# Patient Record
Sex: FEMALE | Race: BLACK OR AFRICAN AMERICAN | NOT HISPANIC OR LATINO | Employment: PART TIME | URBAN - METROPOLITAN AREA
[De-identification: names, ages, dates, MRNs, and addresses within clinical notes are randomized per-mention and may not be internally consistent; named-entity substitution may affect disease eponyms.]

---

## 2018-10-31 ENCOUNTER — HOSPITAL ENCOUNTER (EMERGENCY)
Facility: MEDICAL CENTER | Age: 23
End: 2018-10-31
Attending: EMERGENCY MEDICINE
Payer: COMMERCIAL

## 2018-10-31 VITALS
SYSTOLIC BLOOD PRESSURE: 129 MMHG | BODY MASS INDEX: 19.66 KG/M2 | TEMPERATURE: 98.7 F | DIASTOLIC BLOOD PRESSURE: 84 MMHG | HEART RATE: 94 BPM | RESPIRATION RATE: 18 BRPM | HEIGHT: 69 IN | WEIGHT: 132.72 LBS | OXYGEN SATURATION: 98 %
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
## 2018-10-31 PROCEDURE — 99284 EMERGENCY DEPT VISIT MOD MDM: CPT

## 2018-10-31 RX ORDER — SERTRALINE HYDROCHLORIDE 100 MG/1
100 TABLET, FILM COATED ORAL DAILY
COMMUNITY
End: 2020-05-15
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
## 2018-10-31 NOTE — ED NOTES
RN went through discharge paperwork with the pt. And her significant other. The pt. Was receptive to instructions.
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
## 2019-01-14 ENCOUNTER — NON-PROVIDER VISIT (OUTPATIENT)
Dept: URGENT CARE | Facility: CLINIC | Age: 24
End: 2019-01-14
Payer: COMMERCIAL

## 2019-01-14 DIAGNOSIS — Z02.1 PRE-EMPLOYMENT DRUG SCREENING: ICD-10-CM

## 2019-01-14 LAB
AMP AMPHETAMINE: NORMAL
COC COCAINE: NORMAL
INT CON NEG: NEGATIVE
INT CON POS: POSITIVE
MET METHAMPHETAMINES: NORMAL
OPI OPIATES: NORMAL
PCP PHENCYCLIDINE: NORMAL
POC DRUG COMMENT 753798-OCCUPATIONAL HEALTH: NEGATIVE
THC: NORMAL

## 2019-01-14 PROCEDURE — 80305 DRUG TEST PRSMV DIR OPT OBS: CPT | Performed by: FAMILY MEDICINE

## 2019-10-31 ENCOUNTER — HOSPITAL ENCOUNTER (OUTPATIENT)
Dept: RADIOLOGY | Facility: MEDICAL CENTER | Age: 24
End: 2019-10-31
Attending: PHYSICAL MEDICINE & REHABILITATION
Payer: COMMERCIAL

## 2019-10-31 DIAGNOSIS — M54.2 CERVICALGIA: ICD-10-CM

## 2019-10-31 PROCEDURE — 72141 MRI NECK SPINE W/O DYE: CPT

## 2020-03-12 ENCOUNTER — TELEPHONE (OUTPATIENT)
Dept: HEALTH INFORMATION MANAGEMENT | Facility: OTHER | Age: 25
End: 2020-03-12

## 2020-03-12 NOTE — TELEPHONE ENCOUNTER
1. Caller Name: Edward                        Call Back Number: [phone]  [facility] PCP or Specialty Provider: No         2.  Does patient have any active symptoms of respiratory illness (fever OR cough OR shortness of breath)? Yes, the patient reports the following respiratory symptoms: cough x 7 days. No fever or SOB. C/o fatigue and productive cough. Runny nose.     3.  Does patient have any comoribidities? None     4.  In the last 30 days, has the patient traveled outside of the country OR in a high risk area within the [place] OR have any known contact with someone who has or is suspected to have COVID-19?  No.    5. Disposition: Advised to go to [facility] She verbalized agreement and understanding.

## 2020-03-30 ENCOUNTER — HOSPITAL ENCOUNTER (EMERGENCY)
Facility: MEDICAL CENTER | Age: 25
End: 2020-03-30
Attending: EMERGENCY MEDICINE
Payer: COMMERCIAL

## 2020-03-30 VITALS
HEART RATE: 76 BPM | WEIGHT: 127.87 LBS | DIASTOLIC BLOOD PRESSURE: 94 MMHG | RESPIRATION RATE: 16 BRPM | HEIGHT: 68 IN | OXYGEN SATURATION: 99 % | TEMPERATURE: 98.6 F | BODY MASS INDEX: 19.38 KG/M2 | SYSTOLIC BLOOD PRESSURE: 142 MMHG

## 2020-03-30 DIAGNOSIS — F41.9 ANXIETY: ICD-10-CM

## 2020-03-30 LAB
AMPHET UR QL SCN: NEGATIVE
BARBITURATES UR QL SCN: NEGATIVE
BENZODIAZ UR QL SCN: NEGATIVE
BZE UR QL SCN: NEGATIVE
CANNABINOIDS UR QL SCN: POSITIVE
HCG UR QL: NEGATIVE
METHADONE UR QL SCN: NEGATIVE
OPIATES UR QL SCN: NEGATIVE
OXYCODONE UR QL SCN: NEGATIVE
PCP UR QL SCN: NEGATIVE
POC BREATHALIZER: 0 PERCENT (ref 0–0.01)
PROPOXYPH UR QL SCN: NEGATIVE

## 2020-03-30 PROCEDURE — 90791 PSYCH DIAGNOSTIC EVALUATION: CPT

## 2020-03-30 PROCEDURE — 99284 EMERGENCY DEPT VISIT MOD MDM: CPT

## 2020-03-30 PROCEDURE — 80307 DRUG TEST PRSMV CHEM ANLYZR: CPT

## 2020-03-30 PROCEDURE — 81025 URINE PREGNANCY TEST: CPT

## 2020-03-30 PROCEDURE — 302970 POC BREATHALIZER: Performed by: EMERGENCY MEDICINE

## 2020-03-30 RX ORDER — HYDROXYZINE HYDROCHLORIDE 25 MG/1
25 TABLET, FILM COATED ORAL 3 TIMES DAILY PRN
Qty: 30 TAB | Refills: 0 | Status: SHIPPED | OUTPATIENT
Start: 2020-03-30 | End: 2020-05-15 | Stop reason: SDUPTHER

## 2020-03-30 SDOH — HEALTH STABILITY: MENTAL HEALTH: HOW OFTEN DO YOU HAVE A DRINK CONTAINING ALCOHOL?: NEVER
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
## 2020-03-30 NOTE — ED NOTES
Patient up for discharge after interventions. Patient observed leaving from ER prior to D/C summary to be completed with patient. D/c summary not completed.

## 2020-03-30 NOTE — DISCHARGE PLANNING
Alert Team  Noted pt in [facility] with psych related complaint in triage note.    BE AWARE that pt was just in [facility] area on 3/25/2020.    IN ADDITION, pt called [facility] line 3/12/2020 and reported productive cough and fatigue; was advised to f/u at [facility]; no f/u visit noted in chart.
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
## 2020-03-31 ENCOUNTER — APPOINTMENT (OUTPATIENT)
Dept: BEHAVIORAL HEALTH | Facility: CLINIC | Age: 25
End: 2020-03-31
Payer: COMMERCIAL

## 2020-04-16 ENCOUNTER — HOSPITAL ENCOUNTER (EMERGENCY)
Facility: MEDICAL CENTER | Age: 25
End: 2020-04-16
Attending: EMERGENCY MEDICINE
Payer: COMMERCIAL

## 2020-04-16 VITALS
RESPIRATION RATE: 17 BRPM | TEMPERATURE: 98.2 F | HEART RATE: 83 BPM | OXYGEN SATURATION: 98 % | WEIGHT: 129.63 LBS | SYSTOLIC BLOOD PRESSURE: 158 MMHG | HEIGHT: 68 IN | BODY MASS INDEX: 19.65 KG/M2 | DIASTOLIC BLOOD PRESSURE: 102 MMHG

## 2020-04-16 DIAGNOSIS — Z76.89 ENCOUNTER FOR PSYCHIATRIC ASSESSMENT: ICD-10-CM

## 2020-04-16 PROCEDURE — 99284 EMERGENCY DEPT VISIT MOD MDM: CPT

## 2020-04-16 PROCEDURE — 90791 PSYCH DIAGNOSTIC EVALUATION: CPT

## 2020-04-16 ASSESSMENT — LIFESTYLE VARIABLES: DO YOU DRINK ALCOHOL: NO

## 2020-04-17 NOTE — ED NOTES
Pt Given discharge instructions/  home care instructions, Pt verbalized understanding of instructions given, Pt going to [facility] with  as escort,  pt ambulatory to ER yessica.

## 2020-04-17 NOTE — DISCHARGE INSTRUCTIONS
You were evaluated today for a psychiatric assessment. Your physical exam and labs were reassuring. You were medically cleared in the emergency department, had a psychiatric evaluation performed and you are being discharged from the emergency department. Please follow all the recommendations set by your psychiatrist.    If you have thoughts of suicide, please seek help. This may be a family member, friend, mental health professional, suicide hotline, or any emergency department.     National Suicide Prevention Lifeline: 1-910.372.7012  Available 24hours a day, 7 days a week    Please return to the ED or seek medical attention if you develop:  Fever, severe headache, vomiting, thoughts of suicide or other concerning findings      ================================  Coronavirus Information    Your visit did NOT relate to coronavirus, but if you or your family develop symptoms that concern you for coronavirus (please see CDC website for symptoms), please contact the [facility] hotline (or your local health department)  or your healthcare provider before going to a medical facility:    [facility]  Daytime hours: [phone]  Anytime: 311    Information is available from the Centers for Disease Control and Prevention  www.CDC.gov    and     [facility]  [email or web address]    If you are severely ill or having a hard time breathing, please immediatly seek medical care. Notify the  or Emergency Department Triage about your symptoms.

## 2020-04-17 NOTE — ED NOTES
Received report from Justus MICHELLE Pt care responsibilities assumed. Pt ambulated to room without assistance. Pt ready for eval.

## 2020-04-17 NOTE — DISCHARGE PLANNING
Alert Team note: Consulted with ERP. Patient denies Suicidal or Self harm ideation. Gave patient and her  resources for RB. RB notified that patient will be coming for psychic evaluation upon discharge from [facility].

## 2020-04-17 NOTE — ED PROVIDER NOTES
ED Provider Note    Scribed for Huang Moreno M.D. by Rosemary Lambert. 4/16/2020,  6:05 PM.    Means of Arrival: Walk-In  History obtained from: Patient  History limited by: None     CHIEF COMPLAINT  Chief Complaint   Patient presents with   • Psych Eval     possible bipolar, denies SI or HI       HPI  Edward Gan is a 24 y.o. female with a history of anxiety who presents to the Emergency Department for psychiatric evaluation. She states that she is here because she believes that she is bipolar. She reports associated episodes of anger and depression. She currently complains of fatigue and dehydrated. Patient has eaten today. She is requesting to take a nap and discuss her options later. She notes that her mother exacerbates her symptoms of anger and depression, however she does not have much contact with her anymore. Patient has a history of anxiety, but does not currently taking her medication for it. She smokes marijuana occasionally. Denies suicidal or homicidal ideation. She states that her  in a psychiatrist and he is informing her that she has bipolar disorder. She is requesting resources for psychiatric help.     REVIEW OF SYSTEMS  CONSTITUTIONAL:  No fever. Positive for fatigue and dehydration.   CARDIOVASCULAR:  No chest discomfort.  RESPIRATORY:  No pleuritic chest pain.  GASTROINTESTINAL:  No abdominal pain.  GENITOURINARY:   No dysuria.  MUSCULOSKELETAL:  No arthralgia.  PSYCHIATRIC: Positive for anger, depression, and anxiety. No SI or HI.    See HPI for further details.     PAST MEDICAL HISTORY  Past Medical History:   Diagnosis Date   • Anxiety    • Depression        FAMILY HISTORY  None Pertinent    SOCIAL HISTORY   reports that she has never smoked. She has never used smokeless tobacco. She reports current drug use. She reports that she does not drink alcohol.    SURGICAL HISTORY  History reviewed. No pertinent surgical history.    CURRENT MEDICATIONS  Current Outpatient  "Medications:   •  hydrOXYzine HCl (ATARAX) 25 MG Tab, Take 1 Tab by mouth 3 times a day as needed for Anxiety., Disp: 30 Tab, Rfl: 0  •  sertraline (ZOLOFT) 100 MG Tab, Take 100 mg by mouth every day., Disp: , Rfl:      ALLERGIES  No Known Allergies    PHYSICAL EXAM  VITAL SIGNS: BP (!) 161/104   Pulse 83   Temp 36.8 °C (98.2 °F) (Temporal)   Resp 16   Ht 1.727 m (5' 8\")   Wt 58.8 kg (129 lb 10.1 oz)   SpO2 100%   BMI 19.71 kg/m²    Gen: Alert, no acute distress  HEENT: ATNC  Eyes: PERRL, EOMI, normal conjunctiva.   Neck: trachea midline  Resp: no respiratory distress, lungs clear.   CV: No JVD  Cardiac: Regular rate and rhythm.   Abd: non-distended  Ext: No deformities  Psych: normal mood  Neuro: speech fluent       COURSE & MEDICAL DECISION MAKING  Pertinent Labs & Imaging studies reviewed. (See chart for details)    6:05 PM Patient seen and examined at bedside. Patient present today requesting psychiatric resources. Discussed plan of care with patient, including likely discharge and outpatient follow up with behavioral health. Patient was given opportunity to ask questions and agrees to plan.     6:10 PM - Discussed patient's case with  in [facility]. He states that he is physician and that patient has been manic for the past week and traveled to [place]. 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 Once returned, he saw many messages from her phone implying male companionship. He agrees that she does not meet for legal hold, but hoping to get voluntary treatment.     Medical Decision Making:  Patient presents for psychiatric evaluation.  She does not appear to meet criteria for legal hold, no evidence of disorganized thought, no evidence of suicidal or homicidal ideation.  After discussion with the patient and her , will talk with our alert team to help arrange for resources for possible voluntary placement.  No evidence of toxidrome.    Patient was evaluated by the alert team, will arrange " for them to proceed directly to [place] [facility] for voluntary evaluation.    The patient will return for new or worsening symptoms and is stable at the time of discharge.    The patient is referred to a primary physician for blood pressure management, diabetic screening, and for all other preventative health concerns.    DISPOSITION:  Patient will be discharged home in stable condition.    FOLLOW UP:  To establish a primary care provider within our system, please call [phone]          [facility]  [address] [place] [id number] [phone]  Go to           FINAL IMPRESSION  1. Encounter for psychiatric assessment            Rosemary CHURCH (Scribe), am scribing for, and in the presence of, Huang Moreno M.D..    Electronically signed by: Rosemary Lambert (Scribe), 4/16/2020    IHuang M.D. personally performed the services described in this documentation, as scribed by Rosemary Lambert in my presence, and it is both accurate and complete. E    The note accurately reflects work and decisions made by me.  Huang Moreno M.D.  4/17/2020  12:30 AM

## 2020-04-30 ENCOUNTER — HOSPITAL ENCOUNTER (OUTPATIENT)
Dept: LAB | Facility: MEDICAL CENTER | Age: 25
End: 2020-04-30
Attending: OBSTETRICS & GYNECOLOGY
Payer: COMMERCIAL

## 2020-04-30 LAB
DHEA-S SERPL-MCNC: 436 UG/DL (ref 148–407)
TESTOST SERPL-MCNC: 43 NG/DL (ref 9–75)

## 2020-04-30 PROCEDURE — 84403 ASSAY OF TOTAL TESTOSTERONE: CPT

## 2020-04-30 PROCEDURE — 82157 ASSAY OF ANDROSTENEDIONE: CPT

## 2020-04-30 PROCEDURE — 82627 DEHYDROEPIANDROSTERONE: CPT

## 2020-04-30 PROCEDURE — 36415 COLL VENOUS BLD VENIPUNCTURE: CPT

## 2020-05-03 LAB — ANDROST SERPL-MCNC: 1.62 NG/ML (ref 0.26–2.14)

## 2020-05-12 ENCOUNTER — TELEMEDICINE (OUTPATIENT)
Dept: BEHAVIORAL HEALTH | Facility: CLINIC | Age: 25
End: 2020-05-12
Payer: COMMERCIAL

## 2020-05-12 DIAGNOSIS — Z63.0 STRESS DUE TO MARITAL PROBLEMS: ICD-10-CM

## 2020-05-12 DIAGNOSIS — F43.10 PTSD (POST-TRAUMATIC STRESS DISORDER): ICD-10-CM

## 2020-05-12 DIAGNOSIS — Z62.820 PARENT-CHILD RELATIONSHIP PROBLEM: ICD-10-CM

## 2020-05-12 PROCEDURE — 90791 PSYCH DIAGNOSTIC EVALUATION: CPT | Mod: 95,CR | Performed by: MARRIAGE & FAMILY THERAPIST

## 2020-05-12 RX ORDER — RISPERIDONE 2 MG/1
2 TABLET, ORALLY DISINTEGRATING ORAL 2 TIMES DAILY
COMMUNITY
End: 2020-05-29

## 2020-05-12 RX ORDER — LORAZEPAM 0.5 MG/1
0.5 TABLET ORAL EVERY 4 HOURS PRN
COMMUNITY
End: 2020-05-29

## 2020-05-12 SDOH — SOCIAL STABILITY - SOCIAL INSECURITY: PROBLEMS IN RELATIONSHIP WITH SPOUSE OR PARTNER: Z63.0
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
## 2020-05-15 ENCOUNTER — TELEMEDICINE (OUTPATIENT)
Dept: BEHAVIORAL HEALTH | Facility: CLINIC | Age: 25
End: 2020-05-15
Payer: COMMERCIAL

## 2020-05-15 VITALS — WEIGHT: 135 LBS | HEIGHT: 68 IN | BODY MASS INDEX: 20.46 KG/M2

## 2020-05-15 DIAGNOSIS — F43.10 PTSD (POST-TRAUMATIC STRESS DISORDER): ICD-10-CM

## 2020-05-15 DIAGNOSIS — F41.1 GENERALIZED ANXIETY DISORDER: ICD-10-CM

## 2020-05-15 DIAGNOSIS — F31.70 BIPOLAR DISORDER IN PARTIAL REMISSION, MOST RECENT EPISODE UNSPECIFIED TYPE (HCC): ICD-10-CM

## 2020-05-15 PROBLEM — F31.9 BIPOLAR DISORDER, UNSPECIFIED (HCC): Status: ACTIVE | Noted: 2020-05-15

## 2020-05-15 PROCEDURE — 99204 OFFICE O/P NEW MOD 45 MIN: CPT | Mod: GC,95,CR | Performed by: PSYCHIATRY & NEUROLOGY

## 2020-05-15 RX ORDER — HYDROXYZINE HYDROCHLORIDE 25 MG/1
25 TABLET, FILM COATED ORAL 3 TIMES DAILY PRN
Qty: 90 TAB | Refills: 1 | Status: SHIPPED | OUTPATIENT
Start: 2020-05-15 | End: 2020-08-12 | Stop reason: SDUPTHER

## 2020-05-15 RX ORDER — ARIPIPRAZOLE 5 MG/1
5 TABLET ORAL DAILY
Qty: 30 TAB | Refills: 1 | Status: SHIPPED | OUTPATIENT
Start: 2020-05-15 | End: 2020-05-29 | Stop reason: SDUPTHER

## 2020-05-15 ASSESSMENT — PATIENT HEALTH QUESTIONNAIRE - PHQ9
CLINICAL INTERPRETATION OF PHQ2 SCORE: 1
5. POOR APPETITE OR OVEREATING: 3 - NEARLY EVERY DAY
SUM OF ALL RESPONSES TO PHQ QUESTIONS 1-9: 13
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
## 2020-05-29 ENCOUNTER — TELEMEDICINE (OUTPATIENT)
Dept: BEHAVIORAL HEALTH | Facility: CLINIC | Age: 25
End: 2020-05-29
Payer: COMMERCIAL

## 2020-05-29 VITALS — HEIGHT: 68 IN | BODY MASS INDEX: 20.46 KG/M2 | WEIGHT: 135 LBS

## 2020-05-29 DIAGNOSIS — F31.70 BIPOLAR DISORDER IN PARTIAL REMISSION, MOST RECENT EPISODE UNSPECIFIED TYPE (HCC): ICD-10-CM

## 2020-05-29 DIAGNOSIS — Z79.899 LONG TERM CURRENT USE OF ANTIPSYCHOTIC MEDICATION: ICD-10-CM

## 2020-05-29 DIAGNOSIS — F41.1 GENERALIZED ANXIETY DISORDER: ICD-10-CM

## 2020-05-29 DIAGNOSIS — F43.10 PTSD (POST-TRAUMATIC STRESS DISORDER): ICD-10-CM

## 2020-05-29 PROCEDURE — 99214 OFFICE O/P EST MOD 30 MIN: CPT | Mod: GC,95,CR | Performed by: PSYCHIATRY & NEUROLOGY

## 2020-05-29 RX ORDER — ARIPIPRAZOLE 5 MG/1
5 TABLET ORAL DAILY
Qty: 30 TAB | Refills: 3 | Status: SHIPPED | OUTPATIENT
Start: 2020-05-29 | End: 2020-08-12 | Stop reason: SDUPTHER

## 2020-05-29 NOTE — PROGRESS NOTES
[facility]  PSYCHIATRIC FOLLOW-UP NOTE      Reason for visit / chief complaint:   Chief Complaint   Patient presents with   • Follow-Up       SUBJECTIVE / HPI:  Edward reports that she is doing pretty well overall, she likes the Abilify a lot better.  She says that her mood is stable, she is not having any problems losing her temper or regulating her emotions.  Her grandfather passed away 2 weeks ago that she denies any feelings of depression at this time.  She feels a little sleepy in the afternoon, has been taking some naps.  She has been taking CBD Gummies to help her sleep, has stopped using cannabis at the advice of her psychiatrist on Dr. on demand.  Still endorses some mild anxiety, but takes hydroxyzine as needed, is not having any panic attacks. She is staying in [place] right now with her , they have started couples therapy and have put the divorce on hold for now.  She has been traveling back and forth between [place] and [place] with her mother.  She feels that overall her situation has stabilized.    One thing she wants to discuss today is a possible ADHD diagnosis, did not have enough time to talk about this last time.  She says that she has had an inability to focus and has low motivation in general, she is trying to start work again but feels intimidated.  She says that ever since she was a kid, she has been having trouble with focus, used to be good at getting around it and did well in school, but once she got into college this fell apart.  She says that she dropped out partly because of depression, but she also felt overwhelmed with handling her studies and schedule overall.  She has a younger brother who has problems with executive function and she feels that she also has issues with EF as well.  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  She wants to transfer her credits to [employer] and ideally begin school again in the fall. A verbal screening of ADHD symptoms reveals that most items she considers to occur often or very often.      Psychiatric/Medical ROS:  Depression: Denies SI  Katharina: Denies decreased need for sleep, flight of ideas  Anxiety: Denies panic attacks  Consitutional: Denies fevers/chills, weight changes  Cardiac: Denies chest pain, SOB, palpitations  GI: Denies nausea/vomiting, abdominal pain    Medications:  Current Outpatient Medications on File Prior to Visit   Medication Sig Dispense Refill   • aripiprazole (ABILIFY) 5 MG tablet Take 1 Tab by mouth every day. 30 Tab 1   • hydrOXYzine HCl (ATARAX) 25 MG Tab Take 1 Tab by mouth 3 times a day as needed for Anxiety. 90 Tab 1     No current facility-administered medications on file prior to visit.          Past Medications:  Per Dr. Ziegler's intake 5/15/20:   Patient reports intermittently on and off of Zoloft since the age of 16.  Administered \"Risperdal \"long-acting injectable on 4/20/2020.  Given one-time dose of lorazepam which she states that she is not taking.  Patient reports uses hydroxyzine frequently for anxiety.      Social History:  Per Dr. Ziegler's intake 5/15/20:   -Reports childhood \"traumatic because parents Jewish \"  -High school graduate [place]  -2 years college  - States that her first boyfriend after college broke up with her because \"too much drama, too needy and clingy \"  - Met  2 years ago,  6 months after meeting, no biological children,  has children  - Currently lives with mother in [place], strained relationship with mother  -In process of divorce from   -Financially unstable      OBJECTIVE:  Height 1.727 m (5' 8\"), weight 61.2 kg (135 lb), not currently breastfeeding.      Psychiatric Examination:   Appearance:  " "American female appropriately dressed and groomed  Behavior: Open, cooperative  Thought Content: No SI/HI, no AVH  Thought Process: Linear, goal-directed  Speech: Normal rate and rhythm  Mood: \"Good\"          Affect: Euthymic          Attention/Alertness: Attends well to interview  Orientation/Memory: Grossly intact  Insight/Judgement: Fair/fair         ASSESSMENT:  This is a 24-year-old -American female with bipolar disorder and anxiety as well as PTSD, stable on Abilify and concern today about possible ADHD.  Discussed with her that she does seem to meet criteria, although many of her symptoms can be attributed to anxiety and mood disturbances as well.  Advised her that she would need to be stable for at least a few months on her mood stabilizer before thinking about starting treatment for ADHD.  She indicated understanding.    # Bipolar disorder, unspecified  # JETT  # PTSD  # Long term current use of antipsychotic  # Rule out ADHD      PLAN:  - Continue Abilify 5 mg  - Check A1C, lipids      - Counseled patient on the benefits, side effects, and alternatives to treatment prescribed above.  - RTC in 2 months      This note was created using voice recognition software (Dragon). The accuracy of the dictation is limited by the abilities of the software. I have reviewed the note prior to signing, however some errors in grammar and context are still possible. If you have any questions related to this note please do not hesitate to contact our office.     "

## 2020-06-04 ENCOUNTER — HOSPITAL ENCOUNTER (OUTPATIENT)
Dept: LAB | Facility: MEDICAL CENTER | Age: 25
End: 2020-06-04
Attending: OBSTETRICS & GYNECOLOGY
Payer: COMMERCIAL

## 2020-06-04 ENCOUNTER — HOSPITAL ENCOUNTER (OUTPATIENT)
Dept: LAB | Facility: MEDICAL CENTER | Age: 25
End: 2020-06-04
Attending: STUDENT IN AN ORGANIZED HEALTH CARE EDUCATION/TRAINING PROGRAM
Payer: COMMERCIAL

## 2020-06-04 DIAGNOSIS — Z79.899 LONG TERM CURRENT USE OF ANTIPSYCHOTIC MEDICATION: ICD-10-CM

## 2020-06-04 LAB
CHOLEST SERPL-MCNC: 165 MG/DL (ref 100–199)
EST. AVERAGE GLUCOSE BLD GHB EST-MCNC: 105 MG/DL
FASTING STATUS PATIENT QL REPORTED: NORMAL
HBA1C MFR BLD: 5.3 % (ref 0–5.6)
HCV AB SER QL: NORMAL
HDLC SERPL-MCNC: 77 MG/DL
HIV 1+2 AB+HIV1 P24 AG SERPL QL IA: NORMAL
LDLC SERPL CALC-MCNC: 76 MG/DL
TREPONEMA PALLIDUM IGG+IGM AB [PRESENCE] IN SERUM OR PLASMA BY IMMUNOASSAY: NORMAL
TRIGL SERPL-MCNC: 61 MG/DL (ref 0–149)

## 2020-06-04 PROCEDURE — 87389 HIV-1 AG W/HIV-1&-2 AB AG IA: CPT

## 2020-06-04 PROCEDURE — 36415 COLL VENOUS BLD VENIPUNCTURE: CPT

## 2020-06-04 PROCEDURE — 86803 HEPATITIS C AB TEST: CPT

## 2020-06-04 PROCEDURE — 83036 HEMOGLOBIN GLYCOSYLATED A1C: CPT

## 2020-06-04 PROCEDURE — 80061 LIPID PANEL: CPT

## 2020-06-04 PROCEDURE — 86780 TREPONEMA PALLIDUM: CPT

## 2020-06-08 ENCOUNTER — TELEMEDICINE (OUTPATIENT)
Dept: BEHAVIORAL HEALTH | Facility: CLINIC | Age: 25
End: 2020-06-08
Payer: COMMERCIAL

## 2020-06-08 DIAGNOSIS — Z62.820 PARENT-CHILD RELATIONSHIP PROBLEM: ICD-10-CM

## 2020-06-08 DIAGNOSIS — F31.70 BIPOLAR DISORDER IN PARTIAL REMISSION, MOST RECENT EPISODE UNSPECIFIED TYPE (HCC): ICD-10-CM

## 2020-06-08 PROCEDURE — 90834 PSYTX W PT 45 MINUTES: CPT | Mod: 95,CR | Performed by: MARRIAGE & FAMILY THERAPIST
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
## 2020-06-15 ENCOUNTER — TELEMEDICINE (OUTPATIENT)
Dept: BEHAVIORAL HEALTH | Facility: CLINIC | Age: 25
End: 2020-06-15
Payer: COMMERCIAL

## 2020-06-15 DIAGNOSIS — F43.10 PTSD (POST-TRAUMATIC STRESS DISORDER): ICD-10-CM

## 2020-06-15 DIAGNOSIS — Z63.0 STRESS DUE TO MARITAL PROBLEMS: ICD-10-CM

## 2020-06-15 PROCEDURE — 90834 PSYTX W PT 45 MINUTES: CPT | Mod: 95,CR | Performed by: MARRIAGE & FAMILY THERAPIST

## 2020-06-15 SDOH — SOCIAL STABILITY - SOCIAL INSECURITY: PROBLEMS IN RELATIONSHIP WITH SPOUSE OR PARTNER: Z63.0

## 2020-06-22 ENCOUNTER — TELEMEDICINE (OUTPATIENT)
Dept: BEHAVIORAL HEALTH | Facility: CLINIC | Age: 25
End: 2020-06-22
Payer: COMMERCIAL

## 2020-06-22 DIAGNOSIS — F31.70 BIPOLAR DISORDER IN PARTIAL REMISSION, MOST RECENT EPISODE UNSPECIFIED TYPE (HCC): ICD-10-CM

## 2020-06-22 DIAGNOSIS — Z62.820 PARENT-CHILD RELATIONSHIP PROBLEM: ICD-10-CM

## 2020-06-22 DIAGNOSIS — Z63.0 STRESS DUE TO MARITAL PROBLEMS: ICD-10-CM

## 2020-06-22 PROCEDURE — 90834 PSYTX W PT 45 MINUTES: CPT | Mod: 95,CR | Performed by: MARRIAGE & FAMILY THERAPIST

## 2020-06-22 SDOH — SOCIAL STABILITY - SOCIAL INSECURITY: PROBLEMS IN RELATIONSHIP WITH SPOUSE OR PARTNER: Z63.0
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
## 2020-06-23 ENCOUNTER — TELEPHONE (OUTPATIENT)
Dept: SCHEDULING | Facility: IMAGING CENTER | Age: 25
End: 2020-06-23

## 2020-06-29 ENCOUNTER — TELEMEDICINE (OUTPATIENT)
Dept: BEHAVIORAL HEALTH | Facility: CLINIC | Age: 25
End: 2020-06-29
Payer: COMMERCIAL

## 2020-06-29 DIAGNOSIS — F31.70 BIPOLAR DISORDER IN PARTIAL REMISSION, MOST RECENT EPISODE UNSPECIFIED TYPE (HCC): ICD-10-CM

## 2020-06-29 DIAGNOSIS — Z63.0 STRESS DUE TO MARITAL PROBLEMS: ICD-10-CM

## 2020-06-29 PROCEDURE — 90834 PSYTX W PT 45 MINUTES: CPT | Mod: 95,CR | Performed by: MARRIAGE & FAMILY THERAPIST

## 2020-06-29 SDOH — SOCIAL STABILITY - SOCIAL INSECURITY: PROBLEMS IN RELATIONSHIP WITH SPOUSE OR PARTNER: Z63.0

## 2020-06-29 NOTE — BH THERAPY
" [facility]  Therapy Progress Note    Patient Name: Edward Gan  Patient MRN: [id number]  Today's Date: 6/29/2020     Type of session:Individual psychotherapy  Length of session: 45   minutes  Persons in attendance:Patient    Subjective/New Info: this confidential 45 min zoom psychotherapy session was authorized by covid and approved by pt. Pt is leaving her relationship with H to decide her future with him    Objective/Observations:   Participation: Active verbal participation   Grooming: Casual   Cognition: Alert   Eye contact: Good   Mood: Depressed and Anxious   Affect: Flexible   Thought process: Logical   Speech: Rate within normal limits and Volume within normal limits   Other:     Diagnoses: No diagnosis found.     Current risk:   SUICIDE: Low   Homicide: Not applicable   Self-harm: Low   Relapse: Low   Other:    Safety Plan reviewed? No   If evidence of imminent risk is present, intervention/plan:     Therapeutic Intervention(s): Clarify:  Clarify feelings and Clarify thoughts, Cognitive modification, Positive behavior reinforced, Self-care skills, Stressors assessed and Supportive psychotherapy    Treatment Goal(s)/Objective(s) addressed: id pt stressors including arguing with H, clarified pt is sad about her marriage, using cognitive mod helped pt validate her feelings, reinforced pt for validating her feelings, encouraged cont self care and provided support  For pt    Progress toward Treatment Goals: Mild improvement    Plan:he  - \"Homework\" recommendation: pt will cont to validate her feelings when staying withher mother and will report back in next therapy session    ALMA Marie  6/29/2020                                   "

## 2020-07-21 ENCOUNTER — OFFICE VISIT (OUTPATIENT)
Dept: MEDICAL GROUP | Facility: MEDICAL CENTER | Age: 25
End: 2020-07-21
Payer: COMMERCIAL

## 2020-07-21 VITALS
HEART RATE: 85 BPM | SYSTOLIC BLOOD PRESSURE: 108 MMHG | DIASTOLIC BLOOD PRESSURE: 56 MMHG | BODY MASS INDEX: 22.05 KG/M2 | OXYGEN SATURATION: 96 % | RESPIRATION RATE: 16 BRPM | WEIGHT: 145.5 LBS | TEMPERATURE: 98.5 F | HEIGHT: 68 IN

## 2020-07-21 DIAGNOSIS — Z23 NEED FOR VACCINATION: ICD-10-CM

## 2020-07-21 DIAGNOSIS — R79.89 ELEVATED DHEA: ICD-10-CM

## 2020-07-21 DIAGNOSIS — Z30.41 ORAL CONTRACEPTIVE USE: ICD-10-CM

## 2020-07-21 DIAGNOSIS — L68.0 HIRSUTISM: ICD-10-CM

## 2020-07-21 DIAGNOSIS — Z76.89 ENCOUNTER TO ESTABLISH CARE: Primary | ICD-10-CM

## 2020-07-21 DIAGNOSIS — F41.1 GENERALIZED ANXIETY DISORDER: ICD-10-CM

## 2020-07-21 PROCEDURE — 90471 IMMUNIZATION ADMIN: CPT | Performed by: FAMILY MEDICINE

## 2020-07-21 PROCEDURE — 99204 OFFICE O/P NEW MOD 45 MIN: CPT | Mod: 25 | Performed by: FAMILY MEDICINE

## 2020-07-21 PROCEDURE — 90620 MENB-4C VACCINE IM: CPT | Performed by: FAMILY MEDICINE

## 2020-07-21 PROCEDURE — 90472 IMMUNIZATION ADMIN EACH ADD: CPT | Performed by: FAMILY MEDICINE

## 2020-07-21 PROCEDURE — 90715 TDAP VACCINE 7 YRS/> IM: CPT | Performed by: FAMILY MEDICINE

## 2020-07-21 PROCEDURE — 90651 9VHPV VACCINE 2/3 DOSE IM: CPT | Performed by: FAMILY MEDICINE

## 2020-07-21 RX ORDER — NORGESTIMATE AND ETHINYL ESTRADIOL 7DAYSX3 28
1 KIT ORAL
COMMUNITY
Start: 2020-07-06 | End: 2022-04-19 | Stop reason: SDUPTHER

## 2020-07-21 ASSESSMENT — ENCOUNTER SYMPTOMS
WEIGHT LOSS: 0
DIZZINESS: 0
HEMOPTYSIS: 0
DEPRESSION: 0
PALPITATIONS: 0
CONSTIPATION: 0
VOMITING: 0
FEVER: 0
ABDOMINAL PAIN: 0
MYALGIAS: 0
DIARRHEA: 0
EYE REDNESS: 0
HEADACHES: 0
SENSORY CHANGE: 0
NAUSEA: 0
NERVOUS/ANXIOUS: 1
COUGH: 0
CHILLS: 0
FOCAL WEAKNESS: 0
WHEEZING: 0
EYE PAIN: 0
EYE DISCHARGE: 0
SPUTUM PRODUCTION: 0
SHORTNESS OF BREATH: 0
SINUS PAIN: 0

## 2020-07-21 ASSESSMENT — LIFESTYLE VARIABLES: SUBSTANCE_ABUSE: 0

## 2020-07-21 NOTE — ASSESSMENT & PLAN NOTE
Her recent labs showed elevated DHEA.  She reports having irregular menstrual cycles and excessive hairs on her body.  She reports that she was started on birth control for PCO S and her elevated DHEA is likely due to PCOS.

## 2020-07-21 NOTE — ASSESSMENT & PLAN NOTE
Patient reports that she followed with OB/GYN for her irregular menstrual cycles and excessive hair on her face and on her body.  She was started on oral birth control pills.  She reports that she is currently taking those pills since 3 months and is doing well.  Her OB/GYN is  Dr Hinojosa.  She has had a Pap smear done by OB/GYN, will request records.

## 2020-07-21 NOTE — PROGRESS NOTES
FAMILY MEDICINE VISIT                                                               Chief complaint::The primary encounter diagnosis was Encounter to establish care. Diagnoses of Oral contraceptive use, Need for vaccination, Elevated DHEA (HCC), Generalized anxiety disorder, and Hirsutism were also pertinent to this visit.    History of present illness: Edward aGn is a 24 y.o. female who presented to establish care.    Oral contraceptive use  Patient reports that she followed with OB/GYN for her irregular menstrual cycles and excessive hair on her face and on her body.  She was started on oral birth control pills.  She reports that she is currently taking those pills since 3 months and is doing well.  Her OB/GYN is  Dr Hinojosa.  She has had a Pap smear done by OB/GYN, will request records.    Generalized anxiety disorder  Patient is following with psychiatry and is on medications.  She reports that she was diagnosed with generalized anxiety disorder when she was 16.  She has other diagnoses in her chart bipolar, PTSD and stress due to marital problems.  She has blood work done lipid panel and A1c which came back normal.    Elevated DHEA (HCC)  Her recent labs showed elevated DHEA.  She reports having irregular menstrual cycles and excessive hairs on her body.  She reports that she was started on birth control for PCO S and her elevated DHEA is likely due to PCOS.    Hirsutism  This is a chronic problem for this patient.  She reports that she gets excessive hair on face at chin area and her whole body too.  She discussed this with her OB/GYN and she was started on oral contraceptive pills.      Review of systems:     Review of Systems   Constitutional: Negative for chills, fever, malaise/fatigue and weight loss.   HENT: Negative for ear discharge, ear pain, hearing loss and sinus pain.    Eyes: Negative for pain, discharge and redness.   Respiratory: Negative for cough, hemoptysis, sputum production,  shortness of breath and wheezing.    Cardiovascular: Negative for chest pain, palpitations and leg swelling.   Gastrointestinal: Negative for abdominal pain, constipation, diarrhea, nausea and vomiting.   Genitourinary: Negative for dysuria, frequency and urgency.   Musculoskeletal: Negative for joint pain and myalgias.   Skin: Negative for itching and rash.        Excessive hair growth over face   Neurological: Negative for dizziness, sensory change, focal weakness and headaches.   Endo/Heme/Allergies: Negative for environmental allergies.   Psychiatric/Behavioral: Negative for depression, substance abuse and suicidal ideas. The patient is nervous/anxious.         Past Medical, Surgical and Family History:    Past Medical History:   Diagnosis Date   • Anxiety    • Depression      History reviewed. No pertinent surgical history.  Family History   Problem Relation Age of Onset   • Psychiatric Illness Mother    • Hypertension Mother    • Psychiatric Illness Father    • Other Brother         Spina Bifida   • Cancer Paternal Grandmother         Breast cancer        Social History:    Social History     Tobacco Use   • Smoking status: Never Smoker   • Smokeless tobacco: Never Used   Substance Use Topics   • Alcohol use: Never     Frequency: Never   • Drug use: Yes     Types: Marijuana     Comment: marijuana occasionally. CBD gummies to sleep        Medications and Allergies:     Current Outpatient Medications   Medication Sig Dispense Refill   • aripiprazole (ABILIFY) 5 MG tablet Take 1 Tab by mouth every day. 30 Tab 3   • hydrOXYzine HCl (ATARAX) 25 MG Tab Take 1 Tab by mouth 3 times a day as needed for Anxiety. 90 Tab 1   • TRI-SPRINTEC 0.18/0.215/0.25 MG-35 MCG Tab Take 1 Tab by mouth.       No current facility-administered medications for this visit.         No Known Allergies    Vitals:    /56 (BP Location: Left arm, Patient Position: Sitting, BP Cuff Size: Adult)   Pulse 85   Temp 36.9 °C (98.5 °F)   Resp  "16   Ht 1.727 m (5' 8\")   Wt 66 kg (145 lb 8.1 oz)   SpO2 96%  Body mass index is 22.12 kg/m².    Physical Exam:     Physical Exam   Constitutional: She is well-developed, well-nourished, and in no distress. No distress.   HENT:   Head: Normocephalic and atraumatic.   Right Ear: External ear normal.   Left Ear: External ear normal.   Hair growth over her chin area..   Eyes: Conjunctivae and EOM are normal. Right eye exhibits no discharge. Left eye exhibits no discharge.   Neck: Neck supple. No thyromegaly present.   Cardiovascular: Normal rate, regular rhythm, normal heart sounds and intact distal pulses.   No murmur heard.  Pulmonary/Chest: Effort normal and breath sounds normal. No respiratory distress. She has no wheezes. She has no rales.   Abdominal: Soft. Bowel sounds are normal. She exhibits no distension. There is no abdominal tenderness. There is no guarding.   Musculoskeletal:         General: No deformity or edema.   Neurological: She is alert. She exhibits normal muscle tone. Gait normal.   Skin: Skin is warm. No rash noted. She is not diaphoretic.   Psychiatric: Mood, affect and judgment normal.        Assessment/Plan:    Edward was seen today for establish care.    Diagnoses and all orders for this visit:    Encounter to establish care    Oral contraceptive use:  · Continue same medication regimen.  · Check labs CBC, CMP.    -     Comp Metabolic Panel; Future  -     CBC WITH DIFFERENTIAL; Future    Need for vaccination:  · Given Gardasil, Trumenba and Tdap vaccination today.  ·   -     Gardasil 9  -     Meningococcal (IM) Group B  -     Tdap =>8yo IM    Elevated DHEA (HCC):  · We will give trial to [facility] for 6 months.  If she does not have any improvement, discussed with patient about starting spironolactone.    Generalized anxiety disorder:  · Advised to follow-up with psychiatry.  · Check thyroid function test.    -     TSH WITH REFLEX TO FT4; Future    Hirsutism:  · Recently started on oral " contraceptive pills.  · We will give a trial to OCPs for 6 months, if not better, will start on spironolactone.         Please note that this dictation was created using voice recognition software. I have made every reasonable attempt to correct obvious errors, but I expect that there are errors of grammar and possibly content that I did not discover before finalizing the note.    Follow up in 3 months for lab follow-up, vaccinations.

## 2020-07-21 NOTE — ASSESSMENT & PLAN NOTE
This is a chronic problem for this patient.  She reports that she gets excessive hair on face at chin area and her whole body too.  She discussed this with her OB/GYN and she was started on oral contraceptive pills.

## 2020-07-21 NOTE — LETTER
[facility]  Quintin Reagan M.D.  [address]  [place] [address]  Fax: [phone]   Authorization for Release/Disclosure of   Protected Health Information   Name: EDWARD GAN : 1995 SSN: [id number]   Address: [facility] [place] [address] Phone:    [phone] (home)    I authorize the entity listed below to release/disclose the PHI below to:   [facility]/Quintin Reagan M.D. and Quintin Reagan M.D.   Provider or Entity Name:  [facility]   Address   City, State, Zip   Phone:      Fax:     Reason for request: continuity of care   Information to be released:    [  ] LAST COLONOSCOPY,  including any PATH REPORT and follow-up  [  ] LAST FIT/COLOGUARD RESULT [  ] LAST DEXA  [  ] LAST MAMMOGRAM  [  ] LAST PAP  [  ] LAST LABS [  ] RETINA EXAM REPORT  [  ] IMMUNIZATION RECORDS  [  ] Release all info      [  ] Check here and initial the line next to each item to release ALL health information INCLUDING  _____ Care and treatment for drug and / or alcohol abuse  _____ HIV testing, infection status, or AIDS  _____ Genetic Testing    DATES OF SERVICE OR TIME PERIOD TO BE DISCLOSED: _____________  I understand and acknowledge that:  * This Authorization may be revoked at any time by you in writing, except if your health information has already been used or disclosed.  * Your health information that will be used or disclosed as a result of you signing this authorization could be re-disclosed by the recipient. If this occurs, your re-disclosed health information may no longer be protected by State or Federal laws.  * You may refuse to sign this Authorization. Your refusal will not affect your ability to obtain treatment.  * This Authorization becomes effective upon signing and will  on (date) __________.      If no date is indicated, this Authorization will  one (1) year from the signature date.    Name: Edward Gan    Signature:   Date:          7/21/2020       PLEASE FAX REQUESTED RECORDS BACK TO: [phone]

## 2020-07-21 NOTE — ASSESSMENT & PLAN NOTE
Patient is following with psychiatry and is on medications.  She reports that she was diagnosed with generalized anxiety disorder when she was 16.  She has other diagnoses in her chart bipolar, PTSD and stress due to marital problems.  She has blood work done lipid panel and A1c which came back normal.

## 2020-08-12 ENCOUNTER — TELEMEDICINE (OUTPATIENT)
Dept: BEHAVIORAL HEALTH | Facility: CLINIC | Age: 25
End: 2020-08-12
Payer: COMMERCIAL

## 2020-08-12 VITALS — HEIGHT: 68 IN | BODY MASS INDEX: 21.98 KG/M2 | WEIGHT: 145 LBS

## 2020-08-12 DIAGNOSIS — F31.31 BIPOLAR AFFECTIVE DISORDER, CURRENTLY DEPRESSED, MILD (HCC): ICD-10-CM

## 2020-08-12 DIAGNOSIS — F41.1 GENERALIZED ANXIETY DISORDER: ICD-10-CM

## 2020-08-12 DIAGNOSIS — F43.10 PTSD (POST-TRAUMATIC STRESS DISORDER): ICD-10-CM

## 2020-08-12 DIAGNOSIS — Z63.0 STRESS DUE TO MARITAL PROBLEMS: ICD-10-CM

## 2020-08-12 PROCEDURE — 99214 OFFICE O/P EST MOD 30 MIN: CPT | Mod: 95,CR | Performed by: PSYCHIATRY & NEUROLOGY

## 2020-08-12 RX ORDER — ARIPIPRAZOLE 5 MG/1
5 TABLET ORAL DAILY
Qty: 30 TAB | Refills: 1 | Status: SHIPPED | OUTPATIENT
Start: 2020-08-12 | End: 2020-09-17 | Stop reason: SDUPTHER

## 2020-08-12 RX ORDER — HYDROXYZINE HYDROCHLORIDE 25 MG/1
25 TABLET, FILM COATED ORAL 3 TIMES DAILY
Qty: 90 TAB | Refills: 1 | Status: SHIPPED | OUTPATIENT
Start: 2020-08-12 | End: 2020-09-17 | Stop reason: SDUPTHER

## 2020-08-12 SDOH — SOCIAL STABILITY - SOCIAL INSECURITY: PROBLEMS IN RELATIONSHIP WITH SPOUSE OR PARTNER: Z63.0

## 2020-08-12 SDOH — HEALTH STABILITY: MENTAL HEALTH: HOW OFTEN DO YOU HAVE A DRINK CONTAINING ALCOHOL?: NOT ASKED
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
## 2020-09-01 ENCOUNTER — TELEMEDICINE (OUTPATIENT)
Dept: BEHAVIORAL HEALTH | Facility: CLINIC | Age: 25
End: 2020-09-01
Payer: COMMERCIAL

## 2020-09-01 DIAGNOSIS — F31.31 BIPOLAR AFFECTIVE DISORDER, CURRENTLY DEPRESSED, MILD (HCC): ICD-10-CM

## 2020-09-01 NOTE — BH THERAPY
[facility]  Therapy Progress Note    Patient Name: Edward Gan  Patient MRN: [id number]  Today's Date: 9/1/2020     Type of session:Individual psychotherapy  Length of session: 0 minutes  Persons in attendance:pt did not attend after she signed in with PAR due to medical issue with her H    Subjective/New Info: this confidential zoom psychotherapy session was authorized by covid and approved by pt. Pt was not able to have her session today bc her H had an medical operation she had to drive him to last minute    Objective/Observations:   Participation: n/a   Grooming: n/a   Cognition: n/a   Eye contact: n/a   Mood: n/a   Affect: n/a   Thought process: n/a   Speech: n/a   Other:     Diagnoses: No diagnosis found.     Current risk:   SUICIDE: Not applicable   Homicide: Not applicable   Self-harm: Not applicable   Relapse: Not applicable   Other:    Safety Plan reviewed? Not Indicated   If evidence of imminent risk is present, intervention/plan:     Therapeutic Intervention(s): n/a    Treatment Goal(s)/Objective(s) addressed: n/a     Progress toward Treatment Goals: n/a    Plan:  - n/a    ALMA Marie  9/1/2020

## 2020-09-08 ENCOUNTER — TELEMEDICINE (OUTPATIENT)
Dept: BEHAVIORAL HEALTH | Facility: CLINIC | Age: 25
End: 2020-09-08
Payer: COMMERCIAL

## 2020-09-08 DIAGNOSIS — F41.1 GENERALIZED ANXIETY DISORDER: ICD-10-CM

## 2020-09-08 DIAGNOSIS — Z63.0 STRESS DUE TO MARITAL PROBLEMS: ICD-10-CM

## 2020-09-08 DIAGNOSIS — F32.A DEPRESSION, UNSPECIFIED DEPRESSION TYPE: ICD-10-CM

## 2020-09-08 PROCEDURE — 90834 PSYTX W PT 45 MINUTES: CPT | Mod: 95,CR | Performed by: MARRIAGE & FAMILY THERAPIST

## 2020-09-08 SDOH — SOCIAL STABILITY - SOCIAL INSECURITY: PROBLEMS IN RELATIONSHIP WITH SPOUSE OR PARTNER: Z63.0

## 2020-09-08 NOTE — BH THERAPY
" [facility]  Therapy Progress Note    Patient Name: Edwadr Gan  Patient MRN: [id number]  Today's Date: 9/8/2020     Type of session:Individual psychotherapy  Length of session: 45 minutes  Persons in attendance:Patient    Subjective/New Info: this confidential 45 min zoom psychotherapy session was authorized by covid and approved by pt. Pt is feeling depressed and anxious about where she is in her life. Pt does not love her H but she also does not want to leave the security of her marriage.    Objective/Observations:   Participation: Active verbal participation   Grooming: Casual   Cognition: Fully Oriented   Eye contact: Good   Mood: Depressed   Affect: Sad, Anxious and Tearful   Thought process: Goal-directed   Speech: Rate within normal limits and Volume within normal limits   Other:     Diagnoses: No diagnosis found.     Current risk:   SUICIDE: Low   Homicide: Not applicable   Self-harm: Low   Relapse: Not applicable   Other:    Safety Plan reviewed? Not Indicated   If evidence of imminent risk is present, intervention/plan:     Therapeutic Intervention(s): Clarify:  Clarify feelings and Clarify thoughts, Cognitive modification, Positive behavior reinforced, Self-care skills, Stressors assessed and Supportive psychotherapy    Treatment Goal(s)/Objective(s) addressed: id pt stressors including making a decision about staying or leaving her marriage, clarified pt is torn and in limbo about her marriage, using cognitive mod helped pt reflect on her parents marriage and the similarities and differences in her own marriage, reinforced pt for understanding her marriage reflects her maternal and paternal roles in her parents marriage, encouraged pt to nurture her little girl within for self care and insight and provided support for pt    Progress toward Treatment Goals: Mild improvement    Plan:  - \"Homework\" recommendation: pt agreed to nurture her child within and dialogue with herself about " staying in her marriage bc of fear and dependence and wanting to be free and find herself  and pt will report back i next therapy session    CHETNA Marie.  9/8/2020

## 2020-09-16 ENCOUNTER — TELEMEDICINE (OUTPATIENT)
Dept: BEHAVIORAL HEALTH | Facility: CLINIC | Age: 25
End: 2020-09-16
Payer: COMMERCIAL

## 2020-09-16 DIAGNOSIS — F31.31 BIPOLAR AFFECTIVE DISORDER, CURRENTLY DEPRESSED, MILD (HCC): ICD-10-CM

## 2020-09-16 DIAGNOSIS — Z63.0 STRESS DUE TO MARITAL PROBLEMS: ICD-10-CM

## 2020-09-16 PROCEDURE — 90834 PSYTX W PT 45 MINUTES: CPT | Mod: 95,CR | Performed by: MARRIAGE & FAMILY THERAPIST

## 2020-09-16 SDOH — SOCIAL STABILITY - SOCIAL INSECURITY: PROBLEMS IN RELATIONSHIP WITH SPOUSE OR PARTNER: Z63.0

## 2020-09-16 NOTE — BH THERAPY
" [facility]  Therapy Progress Note    Patient Name: Edward Gan  Patient MRN: [id number]  Today's Date: 9/16/2020     Type of session:Individual psychotherapy  Length of session: 45 minutes  Persons in attendance:Patient    Subjective/New Info: this confidential 45 MIN ZOOM PSYCHOTHERAPY SESSION WAS AUTHORIZED by covid and approved by pt. Pt is adapting to home life after hospitalization and she and her H are re-connecting    Objective/Observations:   Participation: Active verbal participation   Grooming: Casual   Cognition: Alert   Eye contact: Good   Mood: Depressed   Affect: Sad and Tearful   Thought process: Logical   Speech: Rate within normal limits and Volume within normal limits   Other:     Diagnoses: No diagnosis found.     Current risk:   SUICIDE: Low   Homicide: Not applicable   Self-harm: Low   Relapse: Not applicable   Other:    Safety Plan reviewed? No   If evidence of imminent risk is present, intervention/plan:     Therapeutic Intervention(s): Clarify:  Clarify feelings and Clarify thoughts, Cognitive modification, Positive behavior reinforced, Self-care skills, Stressors assessed and Supportive psychotherapy    Treatment Goal(s)/Objective(s) addressed: id pt stressors including depending on her H for financial and emotional support, clarified pt wants to be financially independent for self worth and self value, using cognitive mod helped pt validate her little girl and address low self worth issues, reinforced pt for nurturing herself and her little girl inside her heart, encouraged pt to use gentle self care and provided support for pt     Progress toward Treatment Goals: Mild improvement    Plan:  - \"Homework\" recommendation: pt agreed to cont to nurture and validate the child within and to encourage herself to be financially independent and pt will report back in next therapy session    ALMA Marie  9/16/2020                                     "

## 2020-09-17 ENCOUNTER — TELEMEDICINE (OUTPATIENT)
Dept: BEHAVIORAL HEALTH | Facility: CLINIC | Age: 25
End: 2020-09-17
Payer: COMMERCIAL

## 2020-09-17 VITALS — HEIGHT: 68 IN | WEIGHT: 145 LBS | BODY MASS INDEX: 21.98 KG/M2

## 2020-09-17 DIAGNOSIS — F31.31 BIPOLAR AFFECTIVE DISORDER, CURRENTLY DEPRESSED, MILD (HCC): ICD-10-CM

## 2020-09-17 DIAGNOSIS — F41.1 GENERALIZED ANXIETY DISORDER: ICD-10-CM

## 2020-09-17 DIAGNOSIS — F43.10 PTSD (POST-TRAUMATIC STRESS DISORDER): ICD-10-CM

## 2020-09-17 DIAGNOSIS — Z63.0 STRESS DUE TO MARITAL PROBLEMS: ICD-10-CM

## 2020-09-17 PROCEDURE — 99214 OFFICE O/P EST MOD 30 MIN: CPT | Mod: 95,CR | Performed by: PSYCHIATRY & NEUROLOGY

## 2020-09-17 RX ORDER — ARIPIPRAZOLE 5 MG/1
5 TABLET ORAL DAILY
Qty: 30 TAB | Refills: 1 | Status: SHIPPED | OUTPATIENT
Start: 2020-09-17 | End: 2021-03-08 | Stop reason: SDUPTHER

## 2020-09-17 RX ORDER — HYDROXYZINE HYDROCHLORIDE 25 MG/1
25 TABLET, FILM COATED ORAL 3 TIMES DAILY PRN
Qty: 90 TAB | Refills: 1 | Status: SHIPPED | OUTPATIENT
Start: 2020-09-17 | End: 2021-06-14

## 2020-09-17 SDOH — SOCIAL STABILITY - SOCIAL INSECURITY: PROBLEMS IN RELATIONSHIP WITH SPOUSE OR PARTNER: Z63.0
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
## 2020-09-24 ENCOUNTER — TELEMEDICINE (OUTPATIENT)
Dept: BEHAVIORAL HEALTH | Facility: CLINIC | Age: 25
End: 2020-09-24
Payer: COMMERCIAL

## 2020-09-24 DIAGNOSIS — Z63.0 STRESS DUE TO MARITAL PROBLEMS: ICD-10-CM

## 2020-09-24 DIAGNOSIS — F41.1 GENERALIZED ANXIETY DISORDER: ICD-10-CM

## 2020-09-24 DIAGNOSIS — F32.A DEPRESSION, UNSPECIFIED DEPRESSION TYPE: ICD-10-CM

## 2020-09-24 PROCEDURE — 90834 PSYTX W PT 45 MINUTES: CPT | Mod: 95,CR | Performed by: MARRIAGE & FAMILY THERAPIST

## 2020-09-24 SDOH — SOCIAL STABILITY - SOCIAL INSECURITY: PROBLEMS IN RELATIONSHIP WITH SPOUSE OR PARTNER: Z63.0

## 2020-09-24 NOTE — BH THERAPY
" [facility]  Therapy Progress Note    Patient Name: Edward Gan  Patient MRN: [id number]  Today's Date: 9/24/2020     Type of session:Individual psychotherapy  Length of session: 45 minutes  Persons in attendance:Patient    Subjective/New Info: this confidential 45 min zoom psychotherapy session was authorized by covid and approved by pt. Pt is doing better self care and she is sad that she believes she will end her marriage to Mckayla    Objective/Observations:   Participation: Active verbal participation   Grooming: Casual   Cognition: Alert   Eye contact: Good   Mood: Depressed and Anxious   Affect: Sad and Anxious   Thought process: Goal-directed   Speech: Rate within normal limits and Volume within normal limits   Other:     Diagnoses: No diagnosis found.     Current risk:   SUICIDE: Low   Homicide: Not applicable   Self-harm: Low   Relapse: Low   Other:    Safety Plan reviewed? No   If evidence of imminent risk is present, intervention/plan:     Therapeutic Intervention(s): Clarify:  Clarify feelings and Clarify thoughts, Cognitive modification, Positive behavior reinforced, Self-care skills, Stressors assessed and Supportive psychotherapy    Treatment Goal(s)/Objective(s) addressed: id pt stressors including marital problems, clarified pt is anxious and sad that she believes she will end her marriage to Mckayla ([facility] using cognitive mod helped pt id and nurture her feelings and reinforced pt for id and validating her feelings, encouraged pt self care and provided support for pt    Progress toward Treatment Goals: Mild improvement    Plan:  - \"Homework\" recommendation: pt agreed to cont to id and validate her feelings and to reportback in next therapy session    ALMA Marie  9/24/2020                                     "

## 2020-10-12 ENCOUNTER — HOSPITAL ENCOUNTER (OUTPATIENT)
Dept: LAB | Facility: MEDICAL CENTER | Age: 25
End: 2020-10-12
Attending: FAMILY MEDICINE
Payer: COMMERCIAL

## 2020-10-12 DIAGNOSIS — F41.1 GENERALIZED ANXIETY DISORDER: ICD-10-CM

## 2020-10-12 DIAGNOSIS — Z30.41 ORAL CONTRACEPTIVE USE: ICD-10-CM

## 2020-10-12 LAB
ALBUMIN SERPL BCP-MCNC: 4.5 G/DL (ref 3.2–4.9)
ALBUMIN/GLOB SERPL: 1.6 G/DL
ALP SERPL-CCNC: 35 U/L (ref 30–99)
ALT SERPL-CCNC: 18 U/L (ref 2–50)
ANION GAP SERPL CALC-SCNC: 12 MMOL/L (ref 7–16)
AST SERPL-CCNC: 16 U/L (ref 12–45)
BASOPHILS # BLD AUTO: 0.4 % (ref 0–1.8)
BASOPHILS # BLD: 0.03 K/UL (ref 0–0.12)
BILIRUB SERPL-MCNC: 0.2 MG/DL (ref 0.1–1.5)
BUN SERPL-MCNC: 9 MG/DL (ref 8–22)
CALCIUM SERPL-MCNC: 9.5 MG/DL (ref 8.5–10.5)
CHLORIDE SERPL-SCNC: 100 MMOL/L (ref 96–112)
CO2 SERPL-SCNC: 25 MMOL/L (ref 20–33)
CREAT SERPL-MCNC: 0.73 MG/DL (ref 0.5–1.4)
EOSINOPHIL # BLD AUTO: 0.07 K/UL (ref 0–0.51)
EOSINOPHIL NFR BLD: 0.9 % (ref 0–6.9)
ERYTHROCYTE [DISTWIDTH] IN BLOOD BY AUTOMATED COUNT: 40.7 FL (ref 35.9–50)
GLOBULIN SER CALC-MCNC: 2.9 G/DL (ref 1.9–3.5)
GLUCOSE SERPL-MCNC: 83 MG/DL (ref 65–99)
HCT VFR BLD AUTO: 38.8 % (ref 37–47)
HGB BLD-MCNC: 12.6 G/DL (ref 12–16)
IMM GRANULOCYTES # BLD AUTO: 0.01 K/UL (ref 0–0.11)
IMM GRANULOCYTES NFR BLD AUTO: 0.1 % (ref 0–0.9)
LYMPHOCYTES # BLD AUTO: 1.88 K/UL (ref 1–4.8)
LYMPHOCYTES NFR BLD: 25.2 % (ref 22–41)
MCH RBC QN AUTO: 29.4 PG (ref 27–33)
MCHC RBC AUTO-ENTMCNC: 32.5 G/DL (ref 33.6–35)
MCV RBC AUTO: 90.7 FL (ref 81.4–97.8)
MONOCYTES # BLD AUTO: 0.44 K/UL (ref 0–0.85)
MONOCYTES NFR BLD AUTO: 5.9 % (ref 0–13.4)
NEUTROPHILS # BLD AUTO: 5.04 K/UL (ref 2–7.15)
NEUTROPHILS NFR BLD: 67.5 % (ref 44–72)
NRBC # BLD AUTO: 0 K/UL
NRBC BLD-RTO: 0 /100 WBC
PLATELET # BLD AUTO: 310 K/UL (ref 164–446)
PMV BLD AUTO: 11.4 FL (ref 9–12.9)
POTASSIUM SERPL-SCNC: 4.5 MMOL/L (ref 3.6–5.5)
PROT SERPL-MCNC: 7.4 G/DL (ref 6–8.2)
RBC # BLD AUTO: 4.28 M/UL (ref 4.2–5.4)
SODIUM SERPL-SCNC: 137 MMOL/L (ref 135–145)
TSH SERPL DL<=0.005 MIU/L-ACNC: 0.98 UIU/ML (ref 0.38–5.33)
WBC # BLD AUTO: 7.5 K/UL (ref 4.8–10.8)

## 2020-10-12 PROCEDURE — 84443 ASSAY THYROID STIM HORMONE: CPT

## 2020-10-12 PROCEDURE — 85025 COMPLETE CBC W/AUTO DIFF WBC: CPT

## 2020-10-12 PROCEDURE — 80053 COMPREHEN METABOLIC PANEL: CPT

## 2020-10-12 PROCEDURE — 36415 COLL VENOUS BLD VENIPUNCTURE: CPT

## 2020-10-16 ENCOUNTER — OFFICE VISIT (OUTPATIENT)
Dept: MEDICAL GROUP | Facility: MEDICAL CENTER | Age: 25
End: 2020-10-16
Payer: COMMERCIAL

## 2020-10-16 VITALS
RESPIRATION RATE: 16 BRPM | HEIGHT: 68 IN | WEIGHT: 162 LBS | SYSTOLIC BLOOD PRESSURE: 102 MMHG | DIASTOLIC BLOOD PRESSURE: 67 MMHG | HEART RATE: 67 BPM | BODY MASS INDEX: 24.55 KG/M2 | OXYGEN SATURATION: 100 % | TEMPERATURE: 97.1 F

## 2020-10-16 DIAGNOSIS — Z30.41 ORAL CONTRACEPTIVE USE: ICD-10-CM

## 2020-10-16 DIAGNOSIS — Z23 NEED FOR VACCINATION: ICD-10-CM

## 2020-10-16 DIAGNOSIS — R79.89 ELEVATED DHEA: ICD-10-CM

## 2020-10-16 DIAGNOSIS — L68.0 HIRSUTISM: ICD-10-CM

## 2020-10-16 PROCEDURE — 90471 IMMUNIZATION ADMIN: CPT | Performed by: FAMILY MEDICINE

## 2020-10-16 PROCEDURE — 90621 MENB-FHBP VACC 2/3 DOSE IM: CPT | Performed by: FAMILY MEDICINE

## 2020-10-16 PROCEDURE — 90651 9VHPV VACCINE 2/3 DOSE IM: CPT | Performed by: FAMILY MEDICINE

## 2020-10-16 PROCEDURE — 90472 IMMUNIZATION ADMIN EACH ADD: CPT | Performed by: FAMILY MEDICINE

## 2020-10-16 PROCEDURE — 99214 OFFICE O/P EST MOD 30 MIN: CPT | Mod: 25 | Performed by: FAMILY MEDICINE

## 2020-10-16 RX ORDER — SPIRONOLACTONE 25 MG/1
25 TABLET ORAL DAILY
Qty: 90 TAB | Refills: 1 | Status: SHIPPED | OUTPATIENT
Start: 2020-10-16 | End: 2021-02-09 | Stop reason: SDUPTHER

## 2020-10-16 ASSESSMENT — ENCOUNTER SYMPTOMS
DIARRHEA: 0
FEVER: 0
NAUSEA: 0
WHEEZING: 0
SENSORY CHANGE: 0
ABDOMINAL PAIN: 0
FOCAL WEAKNESS: 0
CONSTIPATION: 0
COUGH: 0
VOMITING: 0
PALPITATIONS: 0
HEADACHES: 0
BLOOD IN STOOL: 0
SHORTNESS OF BREATH: 0
CHILLS: 0
DIZZINESS: 0
HEMOPTYSIS: 0
MYALGIAS: 0

## 2020-10-16 ASSESSMENT — FIBROSIS 4 INDEX: FIB4 SCORE: 0.28

## 2020-10-16 NOTE — ASSESSMENT & PLAN NOTE
This is a chronic problem for this patient.  She reports having excessive hair on face and chin and the whole body too.  She reports that she has been taking birth controls in 6 months and had no improvement in her symptoms.

## 2020-10-16 NOTE — ASSESSMENT & PLAN NOTE
This is a chronic problem for this patient.  Her previous labs showed elevated DHEA.  She meets criteria for PCOS.  She is on birth control.  She is not on any medication for elevated androgen levels

## 2020-10-16 NOTE — ASSESSMENT & PLAN NOTE
This is a chronic wound for this patient.  New to me.  She asked using birth control.  She reports that in terms hirsutism not getting better on birth control.  She reports that she has been taking birth controls in 6 months.  She denies any side effects with this medication.

## 2020-10-16 NOTE — PROGRESS NOTES
FAMILY MEDICINE VISIT                                                               Chief complaint::Diagnoses of Hirsutism, Elevated DHEA (HCC), Oral contraceptive use, and Need for vaccination were pertinent to this visit.    History of present illness: Edward Gan is a 25 y.o. female who presented for follow-up for labs    Oral contraceptive use  This is a chronic wound for this patient.  New to me.  She asked using birth control.  She reports that in terms hirsutism not getting better on birth control.  She reports that she has been taking birth controls in 6 months.  She denies any side effects with this medication.    Elevated DHEA (HCC)  This is a chronic problem for this patient.  Her previous labs showed elevated DHEA.  She meets criteria for PCOS.  She is on birth control.  She is not on any medication for elevated androgen levels    Hirsutism  This is a chronic problem for this patient.  She reports having excessive hair on face and chin and the whole body too.  She reports that she has been taking birth controls in 6 months and had no improvement in her symptoms.    Review of systems:     Review of Systems   Constitutional: Negative for chills, fever and malaise/fatigue.   Respiratory: Negative for cough, hemoptysis, shortness of breath and wheezing.    Cardiovascular: Negative for chest pain, palpitations and leg swelling.   Gastrointestinal: Negative for abdominal pain, blood in stool, constipation, diarrhea, nausea and vomiting.   Musculoskeletal: Negative for myalgias.   Neurological: Negative for dizziness, sensory change, focal weakness and headaches.   Endo/Heme/Allergies:        Excessive facial hair growth        Past Medical, Surgical and Family History:    Past Medical History:   Diagnosis Date   • Anxiety    • Depression      No past surgical history on file.  Family History   Problem Relation Age of Onset   • Psychiatric Illness Mother    • Hypertension Mother    • Psychiatric  "Illness Father    • Other Brother         Spina Bifida   • Cancer Paternal Grandmother         Breast cancer        Social History:    Social History     Tobacco Use   • Smoking status: Never Smoker   • Smokeless tobacco: Never Used   Substance Use Topics   • Alcohol use: Yes     Comment: once a month   • Drug use: Yes     Types: Marijuana     Comment: CBD gummies at bedtime        Medications and Allergies:     Current Outpatient Medications   Medication Sig Dispense Refill   • spironolactone (ALDACTONE) 25 MG Tab Take 1 Tab by mouth every day. 90 Tab 1   • aripiprazole (ABILIFY) 5 MG tablet Take 1 Tab by mouth every day. 30 Tab 1   • hydrOXYzine HCl (ATARAX) 25 MG Tab Take 1 Tab by mouth 3 times a day as needed for Anxiety. 90 Tab 1   • TRI-SPRINTEC 0.18/0.215/0.25 MG-35 MCG Tab Take 1 Tab by mouth.       No current facility-administered medications for this visit.         No Known Allergies    Vitals:    /67 (BP Location: Left arm, Patient Position: Sitting, BP Cuff Size: Adult)   Pulse 67   Temp 36.2 °C (97.1 °F)   Resp 16   Ht 1.727 m (5' 8\")   Wt 73.5 kg (162 lb)   SpO2 100%  Body mass index is 24.63 kg/m².    Physical Exam:     Physical Exam   Constitutional: She is well-developed, well-nourished, and in no distress. No distress.   HENT:   Head: Normocephalic and atraumatic.   Eyes: Conjunctivae are normal.   Neck: Neck supple.   Cardiovascular: Normal rate.   Pulmonary/Chest: Effort normal. No respiratory distress.   Musculoskeletal:         General: No deformity or edema.   Neurological: She is alert. Gait normal.   Skin: No rash noted.   Psychiatric: Mood, affect and judgment normal.        Labs:    Ref Range & Units 4d ago    WBC 4.8 - 10.8 K/uL 7.5    RBC 4.20 - 5.40 M/uL 4.28    Hemoglobin 12.0 - 16.0 g/dL 12.6    Hematocrit 37.0 - 47.0 % 38.8    MCV 81.4 - 97.8 fL 90.7    MCH 27.0 - 33.0 pg 29.4    MCHC 33.6 - 35.0 g/dL 32.5Low     RDW 35.9 - 50.0 fL 40.7    Platelet Count 164 - 446 K/uL " 310    MPV 9.0 - 12.9 fL 11.4    Neutrophils-Polys 44.00 - 72.00 % 67.50    Lymphocytes 22.00 - 41.00 % 25.20    Monocytes 0.00 - 13.40 % 5.90    Eosinophils 0.00 - 6.90 % 0.90    Basophils 0.00 - 1.80 % 0.40    Immature Granulocytes 0.00 - 0.90 % 0.10    Nucleated RBC /100 WBC 0.00    Neutrophils (Absolute) 2.00 - 7.15 K/uL 5.04    Comment: Includes immature neutrophils, if present.   Lymphs (Absolute) 1.00 - 4.80 K/uL 1.88    Monos (Absolute) 0.00 - 0.85 K/uL 0.44    Eos (Absolute) 0.00 - 0.51 K/uL 0.07    Baso (Absolute) 0.00 - 0.12 K/uL 0.03    Immature Granulocytes (abs) 0.00 - 0.11 K/uL 0.01    NRBC (Absolute) K/uL 0.00      Ref Range & Units 4d ago    Sodium 135 - 145 mmol/L 137    Potassium 3.6 - 5.5 mmol/L 4.5    Chloride 96 - 112 mmol/L 100    Co2 20 - 33 mmol/L 25    Anion Gap 7.0 - 16.0 12.0    Glucose 65 - 99 mg/dL 83    Bun 8 - 22 mg/dL 9    Creatinine 0.50 - 1.40 mg/dL 0.73    Calcium 8.5 - 10.5 mg/dL 9.5    AST(SGOT) 12 - 45 U/L 16    ALT(SGPT) 2 - 50 U/L 18    Alkaline Phosphatase 30 - 99 U/L 35    Total Bilirubin 0.1 - 1.5 mg/dL 0.2    Albumin 3.2 - 4.9 g/dL 4.5    Total Protein 6.0 - 8.2 g/dL 7.4    Globulin 1.9 - 3.5 g/dL 2.9    A-G Ratio g/dL 1.6      Ref Range & Units 4d ago    TSH 0.380 - 5.330 uIU/mL 0.984      Ref Range & Units 4d ago    GFR If African American >60 mL/min/1.73 m 2 >60    GFR If Non African American >60 mL/min/1.73 m 2 >60          Assessment/Plan:     Diagnoses and all orders for this visit:    Hirsutism  · Uncontrolled, start patient on spironolactone 5 mg once daily.  · Discussed with patient about side effects of this medication.  · Advised patient to monitor blood pressure closely at home.  If it goes below 90/60, advised to follow-up in office.  · Check BMP before next visit to check kidney function and potassium.    -     spironolactone (ALDACTONE) 25 MG Tab; Take 1 Tab by mouth every day.  -     Basic Metabolic Panel; Future    Elevated DHEA (HCC):  · Start on  spironolactone 25 mg once daily.  · Check BMP to check kidney function and potassium.    -     spironolactone (ALDACTONE) 25 MG Tab; Take 1 Tab by mouth every day.  -     Basic Metabolic Panel; Future    Oral contraceptive use:  · Continue same medication regimen.    Need for vaccination:  · Dose of Men B be given today.  · Gardasil third dose given today.    -     Meningococcal (IM) Group B  -     Gardasil 9      Please note that this dictation was created using voice recognition software. I have made every reasonable attempt to correct obvious errors, but I expect that there are errors of grammar and possibly content that I did not discover before finalizing the note.    Follow up in 3 months for follow-up.

## 2021-01-22 ENCOUNTER — APPOINTMENT (OUTPATIENT)
Dept: MEDICAL GROUP | Facility: MEDICAL CENTER | Age: 26
End: 2021-01-22
Payer: COMMERCIAL

## 2021-02-09 ENCOUNTER — OFFICE VISIT (OUTPATIENT)
Dept: MEDICAL GROUP | Facility: MEDICAL CENTER | Age: 26
End: 2021-02-09
Payer: COMMERCIAL

## 2021-02-09 VITALS
HEIGHT: 68 IN | OXYGEN SATURATION: 98 % | RESPIRATION RATE: 16 BRPM | BODY MASS INDEX: 26.52 KG/M2 | SYSTOLIC BLOOD PRESSURE: 120 MMHG | TEMPERATURE: 97.9 F | DIASTOLIC BLOOD PRESSURE: 80 MMHG | WEIGHT: 175 LBS | HEART RATE: 81 BPM

## 2021-02-09 DIAGNOSIS — L68.0 HIRSUTISM: ICD-10-CM

## 2021-02-09 DIAGNOSIS — R79.89 ELEVATED DHEA: ICD-10-CM

## 2021-02-09 DIAGNOSIS — Z30.41 ORAL CONTRACEPTIVE USE: ICD-10-CM

## 2021-02-09 PROCEDURE — 99214 OFFICE O/P EST MOD 30 MIN: CPT | Performed by: FAMILY MEDICINE

## 2021-02-09 RX ORDER — SPIRONOLACTONE 50 MG/1
50 TABLET, FILM COATED ORAL DAILY
Qty: 90 TAB | Refills: 1 | Status: SHIPPED | OUTPATIENT
Start: 2021-02-09 | End: 2021-10-12 | Stop reason: SDUPTHER

## 2021-02-09 ASSESSMENT — ENCOUNTER SYMPTOMS
COUGH: 0
HEMOPTYSIS: 0
DIARRHEA: 0
CHILLS: 0
PALPITATIONS: 0
ABDOMINAL PAIN: 0
MYALGIAS: 0
VOMITING: 0
WHEEZING: 0
ROS SKIN COMMENTS: EXCESSIVE HAIR GROWTH
NAUSEA: 0
SHORTNESS OF BREATH: 0
FEVER: 0

## 2021-02-09 ASSESSMENT — FIBROSIS 4 INDEX: FIB4 SCORE: 0.28

## 2021-02-10 NOTE — PROGRESS NOTES
FAMILY MEDICINE VISIT                                                               Chief complaint::Diagnoses of Hirsutism, Elevated DHEA (HCC), and Oral contraceptive use were pertinent to this visit.    History of present illness: Edward Gan is a 25 y.o. female who presented for medication follow-up.    At last visit I started her on spironolactone 25 mg once daily for hirsutism and elevated DHEA level.  She reports that she is taking medication as prescribed.  She denies any side effects with medication.  She reports that she did not have any improvement in her symptoms.  She is also taking birth control and reports get regular menstrual cycles with birth control.  Her pressure today is 120/80.  She reports that she is monitoring her blood pressure at home and has been in that range.  She denies any chest pain, palpitations, shortness of breath, lower extremity swelling.    She follows with OB/GYN and she reports that she had Pap smear done a year ago and it was normal.  She reports that she had chlamydia testing done also which came back also normal.      Review of systems:     Review of Systems   Constitutional: Negative for chills, fever and malaise/fatigue.   Respiratory: Negative for cough, hemoptysis, shortness of breath and wheezing.    Cardiovascular: Negative for chest pain, palpitations and leg swelling.   Gastrointestinal: Negative for abdominal pain, diarrhea, nausea and vomiting.   Musculoskeletal: Negative for myalgias.   Skin:        Excessive hair growth        Past Medical, Surgical and Family History:    Past Medical History:   Diagnosis Date   • Anxiety    • Depression      History reviewed. No pertinent surgical history.  Family History   Problem Relation Age of Onset   • Psychiatric Illness Mother    • Hypertension Mother    • Psychiatric Illness Father    • Other Brother         Spina Bifida   • Cancer Paternal Grandmother         Breast cancer        Social History:    Social  "History     Tobacco Use   • Smoking status: Never Smoker   • Smokeless tobacco: Never Used   Substance Use Topics   • Alcohol use: Yes     Comment: once a month   • Drug use: Yes     Types: Marijuana     Comment: CBD gummies at bedtime        Medications and Allergies:     Current Outpatient Medications   Medication Sig Dispense Refill   • spironolactone (ALDACTONE) 50 MG Tab Take 1 Tab by mouth every day. 90 Tab 1   • aripiprazole (ABILIFY) 5 MG tablet Take 1 Tab by mouth every day. 30 Tab 1   • hydrOXYzine HCl (ATARAX) 25 MG Tab Take 1 Tab by mouth 3 times a day as needed for Anxiety. 90 Tab 1   • TRI-SPRINTEC 0.18/0.215/0.25 MG-35 MCG Tab Take 1 Tab by mouth.       No current facility-administered medications for this visit.         No Known Allergies    Vitals:    /80 (BP Location: Left arm, Patient Position: Sitting, BP Cuff Size: Adult)   Pulse 81   Temp 36.6 °C (97.9 °F)   Resp 16   Ht 1.727 m (5' 8\")   Wt 79.4 kg (175 lb)   SpO2 98%  Body mass index is 26.61 kg/m².    Physical Exam:     Physical Exam   Constitutional: She is well-developed, well-nourished, and in no distress. No distress.   HENT:   Head: Normocephalic and atraumatic.   Eyes: Conjunctivae are normal.   Neck: Neck supple.   Cardiovascular: Normal rate.   Pulmonary/Chest: Effort normal and breath sounds normal. No respiratory distress.   Musculoskeletal:         General: No deformity or edema.   Neurological: She is alert. Gait normal.   Skin: No rash noted.   Psychiatric: Mood, affect and judgment normal.          Assessment/Plan:    1. Hirsutism  Uncontrolled, increase spironolactone dose to 50 mg once daily.  Patient able to tolerate 25 mg dose without any side effects.  Check BMP before next visit to check for potassium and kidney function test.  Discussed with patient that it may take longer time to see its effect.  If not improving by next visit, will refer to dermatology.    - spironolactone (ALDACTONE) 50 MG Tab; Take 1 Tab " by mouth every day.  Dispense: 90 Tab; Refill: 1  - Basic Metabolic Panel; Future    2. Elevated DHEA (HCC)  Uncontrolled, increase spironolactone to 50 mg once daily.  We will order further labs at next visit.  Check BMP.    - spironolactone (ALDACTONE) 50 MG Tab; Take 1 Tab by mouth every day.  Dispense: 90 Tab; Refill: 1  - Basic Metabolic Panel; Future    3. Oral contraceptive use  Stable, continue same medication regimen       Discussed with patient diagnosis, management options, and risk, benefits and alternative to treatment plan agreed upon.    Please note that this dictation was created using voice recognition software. I have made every reasonable attempt to correct obvious errors, but I expect that there are errors of grammar and possibly content that I did not discover before finalizing the note.    Follow up in 3 months for lab follow-up and medication follow-up.

## 2021-03-08 RX ORDER — ARIPIPRAZOLE 5 MG/1
5 TABLET ORAL DAILY
Qty: 30 TABLET | Refills: 0 | Status: SHIPPED | OUTPATIENT
Start: 2021-03-08 | End: 2021-04-07 | Stop reason: SDUPTHER

## 2021-04-07 ENCOUNTER — TELEMEDICINE (OUTPATIENT)
Dept: BEHAVIORAL HEALTH | Facility: CLINIC | Age: 26
End: 2021-04-07
Payer: COMMERCIAL

## 2021-04-07 VITALS — WEIGHT: 180 LBS | BODY MASS INDEX: 27.28 KG/M2 | HEIGHT: 68 IN

## 2021-04-07 DIAGNOSIS — Z63.0 STRESS DUE TO MARITAL PROBLEMS: ICD-10-CM

## 2021-04-07 DIAGNOSIS — F41.1 GENERALIZED ANXIETY DISORDER: ICD-10-CM

## 2021-04-07 DIAGNOSIS — F31.76 BIPOLAR DISORDER, IN FULL REMISSION, MOST RECENT EPISODE DEPRESSED (HCC): ICD-10-CM

## 2021-04-07 DIAGNOSIS — F43.10 PTSD (POST-TRAUMATIC STRESS DISORDER): ICD-10-CM

## 2021-04-07 PROCEDURE — 99214 OFFICE O/P EST MOD 30 MIN: CPT | Mod: 95,CR | Performed by: PSYCHIATRY & NEUROLOGY

## 2021-04-07 RX ORDER — ARIPIPRAZOLE 5 MG/1
5 TABLET ORAL DAILY
Qty: 90 TABLET | Refills: 0 | Status: SHIPPED | OUTPATIENT
Start: 2021-04-07 | End: 2021-08-11 | Stop reason: SDUPTHER

## 2021-04-07 SDOH — SOCIAL STABILITY - SOCIAL INSECURITY: PROBLEMS IN RELATIONSHIP WITH SPOUSE OR PARTNER: Z63.0

## 2021-04-07 ASSESSMENT — FIBROSIS 4 INDEX: FIB4 SCORE: 0.28
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
## 2021-05-12 ENCOUNTER — APPOINTMENT (OUTPATIENT)
Dept: MEDICAL GROUP | Facility: MEDICAL CENTER | Age: 26
End: 2021-05-12
Payer: COMMERCIAL

## 2021-06-14 ENCOUNTER — OFFICE VISIT (OUTPATIENT)
Dept: MEDICAL GROUP | Facility: MEDICAL CENTER | Age: 26
End: 2021-06-14

## 2021-06-14 ENCOUNTER — HOSPITAL ENCOUNTER (OUTPATIENT)
Dept: LAB | Facility: MEDICAL CENTER | Age: 26
End: 2021-06-14
Attending: FAMILY MEDICINE
Payer: COMMERCIAL

## 2021-06-14 VITALS
RESPIRATION RATE: 16 BRPM | DIASTOLIC BLOOD PRESSURE: 64 MMHG | WEIGHT: 176.37 LBS | SYSTOLIC BLOOD PRESSURE: 120 MMHG | HEIGHT: 68 IN | HEART RATE: 71 BPM | TEMPERATURE: 97.8 F | BODY MASS INDEX: 26.73 KG/M2 | OXYGEN SATURATION: 95 %

## 2021-06-14 DIAGNOSIS — R79.89 ELEVATED DHEA: ICD-10-CM

## 2021-06-14 DIAGNOSIS — L68.0 HIRSUTISM: ICD-10-CM

## 2021-06-14 DIAGNOSIS — Z30.41 ORAL CONTRACEPTIVE USE: ICD-10-CM

## 2021-06-14 DIAGNOSIS — E66.3 OVERWEIGHT WITH BODY MASS INDEX (BMI) OF 26 TO 26.9 IN ADULT: ICD-10-CM

## 2021-06-14 LAB
ANION GAP SERPL CALC-SCNC: 12 MMOL/L (ref 7–16)
BUN SERPL-MCNC: 10 MG/DL (ref 8–22)
CALCIUM SERPL-MCNC: 9.5 MG/DL (ref 8.5–10.5)
CHLORIDE SERPL-SCNC: 104 MMOL/L (ref 96–112)
CO2 SERPL-SCNC: 25 MMOL/L (ref 20–33)
CREAT SERPL-MCNC: 0.74 MG/DL (ref 0.5–1.4)
GLUCOSE SERPL-MCNC: 88 MG/DL (ref 65–99)
POTASSIUM SERPL-SCNC: 4.3 MMOL/L (ref 3.6–5.5)
SODIUM SERPL-SCNC: 141 MMOL/L (ref 135–145)

## 2021-06-14 PROCEDURE — 80048 BASIC METABOLIC PNL TOTAL CA: CPT

## 2021-06-14 PROCEDURE — 36415 COLL VENOUS BLD VENIPUNCTURE: CPT

## 2021-06-14 PROCEDURE — 99214 OFFICE O/P EST MOD 30 MIN: CPT | Performed by: FAMILY MEDICINE

## 2021-06-14 ASSESSMENT — ENCOUNTER SYMPTOMS
FEVER: 0
CHILLS: 0
PALPITATIONS: 0
ROS SKIN COMMENTS: FACIAL HAIR GROWTH

## 2021-06-14 ASSESSMENT — FIBROSIS 4 INDEX: FIB4 SCORE: 0.28

## 2021-06-14 NOTE — PROGRESS NOTES
FAMILY MEDICINE VISIT                                                               Chief complaint::Diagnoses of Hirsutism, Elevated DHEA (HCC), Oral contraceptive use, and Overweight with body mass index (BMI) of 26 to 26.9 in adult were pertinent to this visit.    History of present illness: Edward Gan is a 25 y.o. female who presented for lab follow-up and medication follow-up.    Recent BMP showed normal potassium, kidney function test..  She is on spironolactone 50 mg daily for hirsutism and elevated DHEA level.  Her facial hair growth is seen.  Has not changed since increasing medication dose.  She does not have any side effects with this medication.  She is on birth control.  She reports that since she is on birth control and on Abilify she gained a lot of weight and would like to try Metformin medication.  She reports that she gained around 30 to 40 pounds after starting medication.  She is following with psychiatry for bipolar disorder.    Review of systems:     Review of Systems   Constitutional: Negative for chills and fever.        Weight gain   Cardiovascular: Negative for chest pain and palpitations.   Skin:        Facial hair growth        Past Medical, Surgical and Family History:    Past Medical History:   Diagnosis Date   • Anxiety    • Depression      No past surgical history on file.  Family History   Problem Relation Age of Onset   • Psychiatric Illness Mother    • Hypertension Mother    • Psychiatric Illness Father    • Other Brother         Spina Bifida   • Cancer Paternal Grandmother         Breast cancer        Social History:    Social History     Tobacco Use   • Smoking status: Never Smoker   • Smokeless tobacco: Never Used   Vaping Use   • Vaping Use: Never used   Substance Use Topics   • Alcohol use: Yes     Comment: once a month   • Drug use: Yes     Types: Marijuana     Comment: CBD gummies at bedtime        Medications and Allergies:     Current Outpatient Medications  "  Medication Sig Dispense Refill   • metFORMIN (GLUCOPHAGE) 500 MG Tab Take 1 tablet by mouth every day. 90 tablet 3   • ARIPiprazole (ABILIFY) 5 MG tablet Take 1 tablet by mouth every day. 90 tablet 0   • spironolactone (ALDACTONE) 50 MG Tab Take 1 Tab by mouth every day. 90 Tab 1   • TRI-SPRINTEC 0.18/0.215/0.25 MG-35 MCG Tab Take 1 Tab by mouth.       No current facility-administered medications for this visit.        No Known Allergies    Vitals:    /64 (BP Location: Left arm, Patient Position: Sitting, BP Cuff Size: Adult)   Pulse 71   Temp 36.6 °C (97.8 °F)   Resp 16   Ht 1.727 m (5' 8\")   Wt 80 kg (176 lb 5.9 oz)   SpO2 95%  Body mass index is 26.82 kg/m².    Physical Exam:     Physical Exam  Constitutional:       General: She is not in acute distress.  HENT:      Head: Normocephalic and atraumatic.   Eyes:      Conjunctiva/sclera: Conjunctivae normal.   Cardiovascular:      Rate and Rhythm: Normal rate.   Pulmonary:      Effort: Pulmonary effort is normal. No respiratory distress.   Musculoskeletal:         General: No deformity.      Cervical back: Neck supple.   Skin:     Findings: No rash.   Neurological:      Mental Status: She is alert.      Gait: Gait is intact.   Psychiatric:         Mood and Affect: Mood and affect normal.         Judgment: Judgment normal.            Assessment/Plan:    1. Hirsutism  Chronic problem, stable, unchanged.  Discussed with patient to continue spironolactone.   If not getting better, will refer to dermatology for laser treatment.    -     Comp Metabolic Panel; Future  -     HEMOGLOBIN A1C; Future  -     Lipid Profile; Future    2. Elevated DHEA (HCC)  Chronic problem, stable, continue spironolactone 50 mg daily.  Check DHEA level in 6 months.    -     DHEA; Future      3. Oral contraceptive use  Chronic problem, stable, continue same birth control.  Since she is gaining weight with birth control and Abilify.  Start Metformin 500 mg daily.    - metFORMIN " (GLUCOPHAGE) 500 MG Tab; Take 1 tablet by mouth every day.  Dispense: 90 tablet; Refill:    4. Overweight with body mass index (BMI) of 26 to 26.9 in adult  Chronic problem, gaining weight due to medications.  Continue to eat healthy diet and do aerobic exercise.  Start Metformin 500 mg to help with weight loss. Ordered CMP, A1c, lipid panel to do in 6 months.    - metFORMIN (GLUCOPHAGE) 500 MG Tab; Take 1 tablet by mouth every day.  Dispense: 90 tablet; Refill: 3   -     Comp Metabolic Panel; Future  -     HEMOGLOBIN A1C; Future  -     Lipid Profile; Future        Please note that this dictation was created using voice recognition software. I have made every reasonable attempt to correct obvious errors, but I expect that there are errors of grammar and possibly content that I did not discover before finalizing the note.    Follow up in 6 months for follow-up and medication follow-up.

## 2021-06-14 NOTE — LETTER
[facility]  Quintin Reagan M.D.  [address]  [place] [address]  Fax: [phone]   Authorization for Release/Disclosure of   Protected Health Information   Name: EDWARD GAN : 1995 SSN: [id number]   Address: [address] [place] [address] [place] [address] Phone:    [phone] (home)    I authorize the entity listed below to release/disclose the PHI below to:   [facility]/Quintin Reagan M.D. and Quintin Reagan M.D.   Provider or Entity Name:     Address   City, State, Zip   Phone:      Fax:     Reason for request: continuity of care   Information to be released:    [  ] LAST COLONOSCOPY,  including any PATH REPORT and follow-up  [  ] LAST FIT/COLOGUARD RESULT [  ] LAST DEXA  [  ] LAST MAMMOGRAM  [  ] LAST PAP  [  ] LAST LABS [  ] RETINA EXAM REPORT  [  ] IMMUNIZATION RECORDS  [  ] Release all info      [  ] Check here and initial the line next to each item to release ALL health information INCLUDING  _____ Care and treatment for drug and / or alcohol abuse  _____ HIV testing, infection status, or AIDS  _____ Genetic Testing    DATES OF SERVICE OR TIME PERIOD TO BE DISCLOSED: _____________  I understand and acknowledge that:  * This Authorization may be revoked at any time by you in writing, except if your health information has already been used or disclosed.  * Your health information that will be used or disclosed as a result of you signing this authorization could be re-disclosed by the recipient. If this occurs, your re-disclosed health information may no longer be protected by State or Federal laws.  * You may refuse to sign this Authorization. Your refusal will not affect your ability to obtain treatment.  * This Authorization becomes effective upon signing and will  on (date) __________.      If no date is indicated, this Authorization will  one (1) year from the signature date.    Name: Edward Gan    Signature:   Date:     2021       PLEASE FAX  REQUESTED RECORDS BACK TO: [phone]

## 2021-08-11 ENCOUNTER — TELEMEDICINE (OUTPATIENT)
Dept: BEHAVIORAL HEALTH | Facility: CLINIC | Age: 26
End: 2021-08-11
Payer: COMMERCIAL

## 2021-08-11 VITALS — BODY MASS INDEX: 26.82 KG/M2 | HEIGHT: 68 IN

## 2021-08-11 DIAGNOSIS — F31.76 BIPOLAR DISORDER, IN FULL REMISSION, MOST RECENT EPISODE DEPRESSED (HCC): ICD-10-CM

## 2021-08-11 DIAGNOSIS — Z79.899 ENCOUNTER FOR LONG-TERM (CURRENT) USE OF MEDICATIONS: ICD-10-CM

## 2021-08-11 DIAGNOSIS — F41.1 GENERALIZED ANXIETY DISORDER: ICD-10-CM

## 2021-08-11 DIAGNOSIS — F43.10 PTSD (POST-TRAUMATIC STRESS DISORDER): ICD-10-CM

## 2021-08-11 PROBLEM — Z63.0 STRESS DUE TO MARITAL PROBLEMS: Status: RESOLVED | Noted: 2020-05-12 | Resolved: 2021-08-11

## 2021-08-11 PROCEDURE — 99214 OFFICE O/P EST MOD 30 MIN: CPT | Mod: 95 | Performed by: PSYCHIATRY & NEUROLOGY

## 2021-08-11 RX ORDER — HYDROXYZINE HYDROCHLORIDE 25 MG/1
25 TABLET, FILM COATED ORAL
Qty: 30 TABLET | Refills: 0
Start: 2021-08-11 | End: 2021-10-12 | Stop reason: SDUPTHER

## 2021-08-11 RX ORDER — ARIPIPRAZOLE 5 MG/1
5 TABLET ORAL DAILY
Qty: 90 TABLET | Refills: 1 | Status: SHIPPED | OUTPATIENT
Start: 2021-08-11 | End: 2022-02-25 | Stop reason: SDUPTHER
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
## 2021-09-15 ENCOUNTER — APPOINTMENT (OUTPATIENT)
Dept: MEDICAL GROUP | Facility: MEDICAL CENTER | Age: 26
End: 2021-09-15
Payer: COMMERCIAL

## 2021-10-12 ENCOUNTER — PATIENT MESSAGE (OUTPATIENT)
Dept: MEDICAL GROUP | Facility: MEDICAL CENTER | Age: 26
End: 2021-10-12

## 2021-10-12 ENCOUNTER — PATIENT MESSAGE (OUTPATIENT)
Dept: BEHAVIORAL HEALTH | Facility: CLINIC | Age: 26
End: 2021-10-12

## 2021-10-12 DIAGNOSIS — F43.10 PTSD (POST-TRAUMATIC STRESS DISORDER): ICD-10-CM

## 2021-10-12 DIAGNOSIS — R79.89 ELEVATED DHEA: ICD-10-CM

## 2021-10-12 DIAGNOSIS — L68.0 HIRSUTISM: ICD-10-CM

## 2021-10-12 DIAGNOSIS — F41.1 GENERALIZED ANXIETY DISORDER: ICD-10-CM

## 2021-10-12 RX ORDER — SPIRONOLACTONE 50 MG/1
50 TABLET, FILM COATED ORAL DAILY
Qty: 90 TABLET | Refills: 3 | Status: ON HOLD | OUTPATIENT
Start: 2021-10-12 | End: 2022-05-24

## 2021-10-12 RX ORDER — HYDROXYZINE HYDROCHLORIDE 25 MG/1
25 TABLET, FILM COATED ORAL
Qty: 90 TABLET | Refills: 0 | Status: ON HOLD | OUTPATIENT
Start: 2021-10-12 | End: 2022-05-24

## 2021-10-12 NOTE — TELEPHONE ENCOUNTER
From: Edward Gan  To: Physician Quintin Reagan  Sent: 10/12/2021 9:33 AM PDT  Subject: Spironolactone    Hi Dr. Reagan,     I recently ran out of spironolactone. Can you refill this for me please?    Thank you.

## 2021-10-18 DIAGNOSIS — R41.840 ATTENTION AND CONCENTRATION DEFICIT: ICD-10-CM

## 2021-11-03 ENCOUNTER — OFFICE VISIT (OUTPATIENT)
Dept: BEHAVIORAL HEALTH | Facility: CLINIC | Age: 26
End: 2021-11-03
Payer: COMMERCIAL

## 2021-11-03 DIAGNOSIS — F90.2 ADHD (ATTENTION DEFICIT HYPERACTIVITY DISORDER), COMBINED TYPE: ICD-10-CM

## 2021-11-03 PROCEDURE — 90791 PSYCH DIAGNOSTIC EVALUATION: CPT | Performed by: PSYCHOLOGIST

## 2021-11-03 PROCEDURE — 96137 PSYCL/NRPSYC TST PHY/QHP EA: CPT | Performed by: PSYCHOLOGIST

## 2021-11-03 PROCEDURE — 96136 PSYCL/NRPSYC TST PHY/QHP 1ST: CPT | Performed by: PSYCHOLOGIST
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
## 2021-11-11 PROCEDURE — 96130 PSYCL TST EVAL PHYS/QHP 1ST: CPT | Performed by: PSYCHOLOGIST

## 2021-12-01 ENCOUNTER — DOCUMENTATION (OUTPATIENT)
Dept: BEHAVIORAL HEALTH | Facility: CLINIC | Age: 26
End: 2021-12-01

## 2021-12-01 ENCOUNTER — OFFICE VISIT (OUTPATIENT)
Dept: BEHAVIORAL HEALTH | Facility: CLINIC | Age: 26
End: 2021-12-01
Payer: COMMERCIAL

## 2021-12-01 VITALS — BODY MASS INDEX: 27.28 KG/M2 | HEIGHT: 68 IN | WEIGHT: 180 LBS

## 2021-12-01 DIAGNOSIS — F31.76 BIPOLAR DISORDER, IN FULL REMISSION, MOST RECENT EPISODE DEPRESSED (HCC): ICD-10-CM

## 2021-12-01 DIAGNOSIS — F90.2 ADHD (ATTENTION DEFICIT HYPERACTIVITY DISORDER), COMBINED TYPE: ICD-10-CM

## 2021-12-01 DIAGNOSIS — F43.10 PTSD (POST-TRAUMATIC STRESS DISORDER): ICD-10-CM

## 2021-12-01 DIAGNOSIS — F41.1 GENERALIZED ANXIETY DISORDER: ICD-10-CM

## 2021-12-01 PROCEDURE — 99214 OFFICE O/P EST MOD 30 MIN: CPT | Performed by: PSYCHIATRY & NEUROLOGY

## 2021-12-01 RX ORDER — DEXTROAMPHETAMINE SACCHARATE, AMPHETAMINE ASPARTATE MONOHYDRATE, DEXTROAMPHETAMINE SULFATE AND AMPHETAMINE SULFATE 3.75; 3.75; 3.75; 3.75 MG/1; MG/1; MG/1; MG/1
15 CAPSULE, EXTENDED RELEASE ORAL EVERY MORNING
Qty: 30 CAPSULE | Refills: 0 | Status: SHIPPED | OUTPATIENT
Start: 2021-12-01 | End: 2021-12-31

## 2021-12-01 ASSESSMENT — FIBROSIS 4 INDEX: FIB4 SCORE: 0.29

## 2021-12-13 ENCOUNTER — APPOINTMENT (OUTPATIENT)
Dept: MEDICAL GROUP | Facility: MEDICAL CENTER | Age: 26
End: 2021-12-13
Payer: COMMERCIAL

## 2021-12-28 ENCOUNTER — TELEMEDICINE (OUTPATIENT)
Dept: BEHAVIORAL HEALTH | Facility: CLINIC | Age: 26
End: 2021-12-28
Payer: COMMERCIAL

## 2021-12-28 DIAGNOSIS — F43.10 PTSD (POST-TRAUMATIC STRESS DISORDER): ICD-10-CM

## 2021-12-28 DIAGNOSIS — F31.76 BIPOLAR DISORDER, IN FULL REMISSION, MOST RECENT EPISODE DEPRESSED (HCC): ICD-10-CM

## 2021-12-28 DIAGNOSIS — F41.1 GENERALIZED ANXIETY DISORDER: ICD-10-CM

## 2021-12-28 DIAGNOSIS — F90.2 ADHD (ATTENTION DEFICIT HYPERACTIVITY DISORDER), COMBINED TYPE: ICD-10-CM

## 2021-12-28 PROCEDURE — 99214 OFFICE O/P EST MOD 30 MIN: CPT | Mod: 95 | Performed by: PSYCHIATRY & NEUROLOGY

## 2021-12-28 RX ORDER — DEXTROAMPHETAMINE SACCHARATE, AMPHETAMINE ASPARTATE MONOHYDRATE, DEXTROAMPHETAMINE SULFATE AND AMPHETAMINE SULFATE 3.75; 3.75; 3.75; 3.75 MG/1; MG/1; MG/1; MG/1
15 CAPSULE, EXTENDED RELEASE ORAL EVERY MORNING
Qty: 30 CAPSULE | Refills: 0 | Status: SHIPPED | OUTPATIENT
Start: 2022-01-28 | End: 2022-02-27

## 2021-12-28 RX ORDER — DEXTROAMPHETAMINE SACCHARATE, AMPHETAMINE ASPARTATE MONOHYDRATE, DEXTROAMPHETAMINE SULFATE AND AMPHETAMINE SULFATE 3.75; 3.75; 3.75; 3.75 MG/1; MG/1; MG/1; MG/1
15 CAPSULE, EXTENDED RELEASE ORAL EVERY MORNING
Qty: 30 CAPSULE | Refills: 0 | Status: SHIPPED | OUTPATIENT
Start: 2021-12-30 | End: 2022-01-29
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
## 2022-02-24 ENCOUNTER — HOSPITAL ENCOUNTER (OUTPATIENT)
Dept: LAB | Facility: MEDICAL CENTER | Age: 27
End: 2022-02-24
Attending: FAMILY MEDICINE
Payer: COMMERCIAL

## 2022-02-24 DIAGNOSIS — L68.0 HIRSUTISM: ICD-10-CM

## 2022-02-24 DIAGNOSIS — E66.3 OVERWEIGHT WITH BODY MASS INDEX (BMI) OF 26 TO 26.9 IN ADULT: ICD-10-CM

## 2022-02-24 DIAGNOSIS — R79.89 ELEVATED DHEA: ICD-10-CM

## 2022-02-24 LAB
ALBUMIN SERPL BCP-MCNC: 4.5 G/DL (ref 3.2–4.9)
ALBUMIN/GLOB SERPL: 1.6 G/DL
ALP SERPL-CCNC: 38 U/L (ref 30–99)
ALT SERPL-CCNC: 17 U/L (ref 2–50)
ANION GAP SERPL CALC-SCNC: 14 MMOL/L (ref 7–16)
AST SERPL-CCNC: 23 U/L (ref 12–45)
BILIRUB SERPL-MCNC: 0.5 MG/DL (ref 0.1–1.5)
BUN SERPL-MCNC: 10 MG/DL (ref 8–22)
CALCIUM SERPL-MCNC: 9.4 MG/DL (ref 8.5–10.5)
CHLORIDE SERPL-SCNC: 103 MMOL/L (ref 96–112)
CHOLEST SERPL-MCNC: 184 MG/DL (ref 100–199)
CO2 SERPL-SCNC: 24 MMOL/L (ref 20–33)
CREAT SERPL-MCNC: 0.73 MG/DL (ref 0.5–1.4)
EST. AVERAGE GLUCOSE BLD GHB EST-MCNC: 94 MG/DL
GLOBULIN SER CALC-MCNC: 2.9 G/DL (ref 1.9–3.5)
GLUCOSE SERPL-MCNC: 85 MG/DL (ref 65–99)
HBA1C MFR BLD: 4.9 % (ref 4–5.6)
HDLC SERPL-MCNC: 76 MG/DL
LDLC SERPL CALC-MCNC: 86 MG/DL
POTASSIUM SERPL-SCNC: 3.6 MMOL/L (ref 3.6–5.5)
PROT SERPL-MCNC: 7.4 G/DL (ref 6–8.2)
SODIUM SERPL-SCNC: 141 MMOL/L (ref 135–145)
TRIGL SERPL-MCNC: 112 MG/DL (ref 0–149)

## 2022-02-24 PROCEDURE — 36415 COLL VENOUS BLD VENIPUNCTURE: CPT

## 2022-02-24 PROCEDURE — 83036 HEMOGLOBIN GLYCOSYLATED A1C: CPT

## 2022-02-24 PROCEDURE — 82626 DEHYDROEPIANDROSTERONE: CPT

## 2022-02-24 PROCEDURE — 80061 LIPID PANEL: CPT

## 2022-02-24 PROCEDURE — 80053 COMPREHEN METABOLIC PANEL: CPT

## 2022-02-25 ENCOUNTER — TELEMEDICINE (OUTPATIENT)
Dept: BEHAVIORAL HEALTH | Facility: CLINIC | Age: 27
End: 2022-02-25
Payer: COMMERCIAL

## 2022-02-25 DIAGNOSIS — F90.2 ADHD (ATTENTION DEFICIT HYPERACTIVITY DISORDER), COMBINED TYPE: ICD-10-CM

## 2022-02-25 DIAGNOSIS — F31.76 BIPOLAR DISORDER, IN FULL REMISSION, MOST RECENT EPISODE DEPRESSED (HCC): ICD-10-CM

## 2022-02-25 DIAGNOSIS — F41.1 GENERALIZED ANXIETY DISORDER: ICD-10-CM

## 2022-02-25 DIAGNOSIS — F43.10 PTSD (POST-TRAUMATIC STRESS DISORDER): ICD-10-CM

## 2022-02-25 PROCEDURE — 99214 OFFICE O/P EST MOD 30 MIN: CPT | Mod: 95 | Performed by: PSYCHIATRY & NEUROLOGY

## 2022-02-25 PROCEDURE — 90833 PSYTX W PT W E/M 30 MIN: CPT | Mod: 95 | Performed by: PSYCHIATRY & NEUROLOGY

## 2022-02-25 RX ORDER — DEXTROAMPHETAMINE SACCHARATE, AMPHETAMINE ASPARTATE MONOHYDRATE, DEXTROAMPHETAMINE SULFATE AND AMPHETAMINE SULFATE 3.75; 3.75; 3.75; 3.75 MG/1; MG/1; MG/1; MG/1
15 CAPSULE, EXTENDED RELEASE ORAL 2 TIMES DAILY
Qty: 60 CAPSULE | Refills: 0 | Status: SHIPPED | OUTPATIENT
Start: 2022-03-26 | End: 2022-04-25

## 2022-02-25 RX ORDER — DEXTROAMPHETAMINE SACCHARATE, AMPHETAMINE ASPARTATE MONOHYDRATE, DEXTROAMPHETAMINE SULFATE AND AMPHETAMINE SULFATE 3.75; 3.75; 3.75; 3.75 MG/1; MG/1; MG/1; MG/1
15 CAPSULE, EXTENDED RELEASE ORAL 2 TIMES DAILY
Qty: 60 CAPSULE | Refills: 0 | Status: SHIPPED | OUTPATIENT
Start: 2022-02-25 | End: 2022-02-28 | Stop reason: SDUPTHER

## 2022-02-25 RX ORDER — ARIPIPRAZOLE 5 MG/1
5 TABLET ORAL DAILY
Qty: 90 TABLET | Refills: 0 | Status: SHIPPED | OUTPATIENT
Start: 2022-02-25 | End: 2022-04-22 | Stop reason: SDUPTHER

## 2022-02-25 RX ORDER — DEXTROAMPHETAMINE SACCHARATE, AMPHETAMINE ASPARTATE MONOHYDRATE, DEXTROAMPHETAMINE SULFATE AND AMPHETAMINE SULFATE 3.75; 3.75; 3.75; 3.75 MG/1; MG/1; MG/1; MG/1
15 CAPSULE, EXTENDED RELEASE ORAL EVERY MORNING
Qty: 30 CAPSULE | Refills: 0 | Status: CANCELLED | OUTPATIENT
Start: 2022-04-07 | End: 2022-05-07

## 2022-02-25 ASSESSMENT — PATIENT HEALTH QUESTIONNAIRE - PHQ9
5. POOR APPETITE OR OVEREATING: 0 - NOT AT ALL
CLINICAL INTERPRETATION OF PHQ2 SCORE: 0
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
## 2022-02-28 ENCOUNTER — PATIENT MESSAGE (OUTPATIENT)
Dept: BEHAVIORAL HEALTH | Facility: CLINIC | Age: 27
End: 2022-02-28
Payer: COMMERCIAL

## 2022-02-28 DIAGNOSIS — F90.2 ADHD (ATTENTION DEFICIT HYPERACTIVITY DISORDER), COMBINED TYPE: ICD-10-CM

## 2022-02-28 RX ORDER — DEXTROAMPHETAMINE SACCHARATE, AMPHETAMINE ASPARTATE MONOHYDRATE, DEXTROAMPHETAMINE SULFATE AND AMPHETAMINE SULFATE 3.75; 3.75; 3.75; 3.75 MG/1; MG/1; MG/1; MG/1
15 CAPSULE, EXTENDED RELEASE ORAL 2 TIMES DAILY
Qty: 60 CAPSULE | Refills: 0 | Status: SHIPPED | OUTPATIENT
Start: 2022-02-28 | End: 2022-03-30

## 2022-03-01 LAB — DHEA SERPL-MCNC: 6.17 NG/ML (ref 1.33–7.78)

## 2022-03-28 ENCOUNTER — PATIENT MESSAGE (OUTPATIENT)
Dept: MEDICAL GROUP | Facility: MEDICAL CENTER | Age: 27
End: 2022-03-28

## 2022-03-28 ENCOUNTER — HOSPITAL ENCOUNTER (OUTPATIENT)
Facility: MEDICAL CENTER | Age: 27
End: 2022-03-28
Attending: FAMILY MEDICINE
Payer: COMMERCIAL

## 2022-03-28 ENCOUNTER — OFFICE VISIT (OUTPATIENT)
Dept: MEDICAL GROUP | Facility: MEDICAL CENTER | Age: 27
End: 2022-03-28
Payer: COMMERCIAL

## 2022-03-28 VITALS
TEMPERATURE: 97.7 F | HEART RATE: 61 BPM | OXYGEN SATURATION: 100 % | RESPIRATION RATE: 16 BRPM | WEIGHT: 178.57 LBS | SYSTOLIC BLOOD PRESSURE: 100 MMHG | BODY MASS INDEX: 27.06 KG/M2 | HEIGHT: 68 IN | DIASTOLIC BLOOD PRESSURE: 58 MMHG

## 2022-03-28 DIAGNOSIS — Z80.3 FAMILY HISTORY OF BREAST CANCER: ICD-10-CM

## 2022-03-28 DIAGNOSIS — Z11.3 ROUTINE SCREENING FOR STI (SEXUALLY TRANSMITTED INFECTION): ICD-10-CM

## 2022-03-28 DIAGNOSIS — N63.10 BREAST MASS, RIGHT: ICD-10-CM

## 2022-03-28 DIAGNOSIS — Z12.4 SCREENING FOR CERVICAL CANCER: ICD-10-CM

## 2022-03-28 DIAGNOSIS — Z13.79 GENETIC SCREENING: ICD-10-CM

## 2022-03-28 LAB
INT CON NEG: NEGATIVE
INT CON POS: POSITIVE
POC URINE PREGNANCY TEST: NORMAL

## 2022-03-28 PROCEDURE — 87491 CHLMYD TRACH DNA AMP PROBE: CPT

## 2022-03-28 PROCEDURE — 81025 URINE PREGNANCY TEST: CPT | Performed by: FAMILY MEDICINE

## 2022-03-28 PROCEDURE — 87591 N.GONORRHOEAE DNA AMP PROB: CPT

## 2022-03-28 PROCEDURE — 99214 OFFICE O/P EST MOD 30 MIN: CPT | Performed by: FAMILY MEDICINE

## 2022-03-28 ASSESSMENT — ENCOUNTER SYMPTOMS
FEVER: 0
CHILLS: 0

## 2022-03-28 ASSESSMENT — FIBROSIS 4 INDEX: FIB4 SCORE: 0.43

## 2022-03-28 NOTE — PROGRESS NOTES
FAMILY MEDICINE VISIT                                                               Chief complaint::Diagnoses of Breast mass, right, Family history of breast cancer, Genetic screening, Screening for cervical cancer, and Routine screening for STI (sexually transmitted infection) were pertinent to this visit.    History of present illness: Edward Gan is a 26 y.o. female who presented for right breast mass.        She noticed mass in her right breast while showering on Thursday of last week.  She denies any pain in right breast.  No lymph node enlargement.  No recent infection.  Got her COVID 19 booster in December.    No family history of breast cancer on maternal side.  Family history of breast cancer in paternal grandmother.  Her last menstrual cycle was 1 week ago.  She is on birth control.    Review of systems:     Review of Systems   Constitutional: Negative for chills and fever.   Right breast mass    Medications and Allergies:     Current Outpatient Medications   Medication Sig Dispense Refill   • amphetamine-dextroamphetamine (ADDERALL XR) 15 MG XR capsule Take 1 Capsule by mouth 2 times a day for 30 days. Take second dose prior to 2 PM. 60 Capsule 0   • amphetamine-dextroamphetamine (ADDERALL XR) 15 MG XR capsule Take 1 Capsule by mouth 2 times a day for 30 days. Take second dose prior to 2 PM. 60 Capsule 0   • ARIPiprazole (ABILIFY) 5 MG tablet Take 1 Tablet by mouth every day. 90 Tablet 0   • spironolactone (ALDACTONE) 50 MG Tab Take 1 Tablet by mouth every day. 90 Tablet 3   • hydrOXYzine HCl (ATARAX) 25 MG Tab Take 1 Tablet by mouth 1 time a day as needed for Anxiety. 90 Tablet 0   • metFORMIN (GLUCOPHAGE) 500 MG Tab Take 1 tablet by mouth every day. 90 tablet 3   • TRI-SPRINTEC 0.18/0.215/0.25 MG-35 MCG Tab Take 1 Tab by mouth.       No current facility-administered medications for this visit.          Vitals:    /58 (BP Location: Left arm, Patient Position: Sitting, BP Cuff Size:  "Adult)   Pulse 61   Temp 36.5 °C (97.7 °F)   Resp 16   Ht 1.727 m (5' 8\")   Wt 81 kg (178 lb 9.2 oz)   SpO2 100%  Body mass index is 27.15 kg/m².    Physical Exam:     Physical Exam  Constitutional:       General: She is not in acute distress.  HENT:      Head: Normocephalic and atraumatic.   Eyes:      Conjunctiva/sclera: Conjunctivae normal.   Cardiovascular:      Rate and Rhythm: Normal rate.   Pulmonary:      Effort: Pulmonary effort is normal. No respiratory distress.   Musculoskeletal:         General: No deformity.      Cervical back: Neck supple.   Skin:     Findings: No rash.   Neurological:      Mental Status: She is alert.      Gait: Gait is intact.   Psychiatric:         Mood and Affect: Mood and affect normal.         Judgment: Judgment normal.     Medical assistant Evonne present in room during breast exam.  Right breast:Nontender about 1 cm in diameter movable hard mass palpated at 12 o'clock position of right breast, no lymphadenopathy  Left breast no mass palpated, no lymphadenopathy        Assessment/Plan:    1. Breast mass, right  New health problem, pregnancy test in office negative.  Order ultrasound breast.    - US-BREAST LIMITED-RIGHT; Future  - POCT Pregnancy    2. Family history of breast cancer  3. Genetic screening  Discussed with patient about genetic testing.  Referral to [employer] project.    - Referral to Genetic Research Studies    4. Screening for cervical cancer  She reports that she had Pap smear done with gynecology last year, her gynecologist retired, requested new referral to gynecology.  Request records for Pap smear.    - Referral to Gynecology    5. Routine screening for STI (sexually transmitted infection)  Ordered GC and committee test for STI screening.    - CHLAMYDIA/GC PCR URINE; Future    Please note that this dictation was created using voice recognition software. I have made every reasonable attempt to correct obvious errors, but I expect that there are " errors of grammar and possibly content that I did not discover before finalizing the note.    Follow up as needed if symptoms not getting better.

## 2022-03-29 ENCOUNTER — RESEARCH ENCOUNTER (OUTPATIENT)
Dept: CARDIOLOGY | Facility: MEDICAL CENTER | Age: 27
End: 2022-03-29
Attending: FAMILY MEDICINE
Payer: COMMERCIAL

## 2022-03-29 DIAGNOSIS — Z00.6 RESEARCH STUDY PATIENT: ICD-10-CM

## 2022-03-29 LAB
C TRACH DNA SPEC QL NAA+PROBE: NEGATIVE
N GONORRHOEA DNA SPEC QL NAA+PROBE: NEGATIVE
SPECIMEN SOURCE: NORMAL

## 2022-04-19 ENCOUNTER — PATIENT MESSAGE (OUTPATIENT)
Dept: MEDICAL GROUP | Facility: MEDICAL CENTER | Age: 27
End: 2022-04-19
Payer: COMMERCIAL

## 2022-04-19 ENCOUNTER — HOSPITAL ENCOUNTER (OUTPATIENT)
Dept: RADIOLOGY | Facility: MEDICAL CENTER | Age: 27
End: 2022-04-19
Attending: FAMILY MEDICINE
Payer: COMMERCIAL

## 2022-04-19 DIAGNOSIS — N63.10 BREAST MASS, RIGHT: ICD-10-CM

## 2022-04-19 PROCEDURE — 76642 ULTRASOUND BREAST LIMITED: CPT | Mod: RT

## 2022-04-19 RX ORDER — NORGESTIMATE AND ETHINYL ESTRADIOL 7DAYSX3 28
1 KIT ORAL DAILY
Qty: 84 TABLET | Refills: 3 | Status: ON HOLD | OUTPATIENT
Start: 2022-04-19 | End: 2022-05-24

## 2022-04-20 ENCOUNTER — HOSPITAL ENCOUNTER (OUTPATIENT)
Dept: RADIOLOGY | Facility: MEDICAL CENTER | Age: 27
End: 2022-04-20
Attending: FAMILY MEDICINE
Payer: COMMERCIAL

## 2022-04-20 DIAGNOSIS — R92.8 ABNORMAL ULTRASOUND OF BREAST: ICD-10-CM

## 2022-04-20 LAB — PATHOLOGY CONSULT NOTE: NORMAL

## 2022-04-20 PROCEDURE — 19083 BX BREAST 1ST LESION US IMAG: CPT | Mod: RT

## 2022-04-20 PROCEDURE — 88305 TISSUE EXAM BY PATHOLOGIST: CPT

## 2022-04-21 ENCOUNTER — TELEPHONE (OUTPATIENT)
Dept: RADIOLOGY | Facility: MEDICAL CENTER | Age: 27
End: 2022-04-21
Payer: COMMERCIAL

## 2022-04-21 DIAGNOSIS — N63.10 BREAST MASS, RIGHT: ICD-10-CM

## 2022-04-22 ENCOUNTER — TELEMEDICINE (OUTPATIENT)
Dept: BEHAVIORAL HEALTH | Facility: CLINIC | Age: 27
End: 2022-04-22
Payer: COMMERCIAL

## 2022-04-22 DIAGNOSIS — F31.76 BIPOLAR DISORDER, IN FULL REMISSION, MOST RECENT EPISODE DEPRESSED (HCC): ICD-10-CM

## 2022-04-22 DIAGNOSIS — F41.1 GENERALIZED ANXIETY DISORDER: ICD-10-CM

## 2022-04-22 DIAGNOSIS — F43.10 PTSD (POST-TRAUMATIC STRESS DISORDER): ICD-10-CM

## 2022-04-22 DIAGNOSIS — F90.2 ADHD (ATTENTION DEFICIT HYPERACTIVITY DISORDER), COMBINED TYPE: ICD-10-CM

## 2022-04-22 PROBLEM — Z62.820 PARENT-CHILD RELATIONSHIP PROBLEM: Status: RESOLVED | Noted: 2020-05-12 | Resolved: 2022-04-22

## 2022-04-22 PROCEDURE — 99214 OFFICE O/P EST MOD 30 MIN: CPT | Mod: 95 | Performed by: PSYCHIATRY & NEUROLOGY

## 2022-04-22 RX ORDER — ONDANSETRON 4 MG/1
TABLET, ORALLY DISINTEGRATING ORAL
Status: ON HOLD | COMMUNITY
Start: 2022-04-21 | End: 2022-05-24

## 2022-04-22 RX ORDER — AMOXICILLIN 875 MG/1
TABLET, COATED ORAL
Status: ON HOLD | COMMUNITY
Start: 2022-04-21 | End: 2022-05-24

## 2022-04-22 RX ORDER — HYDROCODONE BITARTRATE AND ACETAMINOPHEN 5; 325 MG/1; MG/1
TABLET ORAL
Status: ON HOLD | COMMUNITY
Start: 2022-04-21 | End: 2022-05-24

## 2022-04-22 RX ORDER — DEXTROAMPHETAMINE SACCHARATE, AMPHETAMINE ASPARTATE MONOHYDRATE, DEXTROAMPHETAMINE SULFATE AND AMPHETAMINE SULFATE 3.75; 3.75; 3.75; 3.75 MG/1; MG/1; MG/1; MG/1
15 CAPSULE, EXTENDED RELEASE ORAL 2 TIMES DAILY
Qty: 60 CAPSULE | Refills: 0 | Status: ON HOLD | OUTPATIENT
Start: 2022-05-09 | End: 2022-05-24

## 2022-04-22 RX ORDER — ARIPIPRAZOLE 5 MG/1
5 TABLET ORAL DAILY
Qty: 90 TABLET | Refills: 0 | Status: SHIPPED | OUTPATIENT
Start: 2022-04-22 | End: 2022-06-02 | Stop reason: DRUGHIGH

## 2022-04-22 RX ORDER — DEXTROAMPHETAMINE SACCHARATE, AMPHETAMINE ASPARTATE MONOHYDRATE, DEXTROAMPHETAMINE SULFATE AND AMPHETAMINE SULFATE 3.75; 3.75; 3.75; 3.75 MG/1; MG/1; MG/1; MG/1
15 CAPSULE, EXTENDED RELEASE ORAL 2 TIMES DAILY
Qty: 60 CAPSULE | Refills: 0 | Status: SHIPPED | OUTPATIENT
Start: 2022-07-07 | End: 2022-08-06

## 2022-04-22 RX ORDER — DEXTROAMPHETAMINE SACCHARATE, AMPHETAMINE ASPARTATE MONOHYDRATE, DEXTROAMPHETAMINE SULFATE AND AMPHETAMINE SULFATE 3.75; 3.75; 3.75; 3.75 MG/1; MG/1; MG/1; MG/1
15 CAPSULE, EXTENDED RELEASE ORAL 2 TIMES DAILY
Qty: 60 CAPSULE | Refills: 0 | Status: ON HOLD | OUTPATIENT
Start: 2022-06-07 | End: 2022-05-24
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
## 2022-05-05 LAB
APOB+LDLR+PCSK9 GENE MUT ANL BLD/T: NOT DETECTED
BRCA1+BRCA2 DEL+DUP + FULL MUT ANL BLD/T: NOT DETECTED
MLH1+MSH2+MSH6+PMS2 GN DEL+DUP+FUL M: NOT DETECTED

## 2022-05-10 ENCOUNTER — PRE-ADMISSION TESTING (OUTPATIENT)
Dept: ADMISSIONS | Facility: MEDICAL CENTER | Age: 27
End: 2022-05-10
Attending: SURGERY
Payer: OTHER GOVERNMENT

## 2022-05-10 DIAGNOSIS — Z01.812 PRE-OPERATIVE LABORATORY EXAMINATION: ICD-10-CM

## 2022-05-10 LAB
ALBUMIN SERPL BCP-MCNC: 4.6 G/DL (ref 3.2–4.9)
ALBUMIN/GLOB SERPL: 1.6 G/DL
ALP SERPL-CCNC: 49 U/L (ref 30–99)
ALT SERPL-CCNC: 16 U/L (ref 2–50)
ANION GAP SERPL CALC-SCNC: 14 MMOL/L (ref 7–16)
AST SERPL-CCNC: 9 U/L (ref 12–45)
BASOPHILS # BLD AUTO: 0.3 % (ref 0–1.8)
BASOPHILS # BLD: 0.02 K/UL (ref 0–0.12)
BILIRUB SERPL-MCNC: 0.3 MG/DL (ref 0.1–1.5)
BUN SERPL-MCNC: 6 MG/DL (ref 8–22)
CALCIUM SERPL-MCNC: 9.6 MG/DL (ref 8.5–10.5)
CHLORIDE SERPL-SCNC: 102 MMOL/L (ref 96–112)
CO2 SERPL-SCNC: 23 MMOL/L (ref 20–33)
CREAT SERPL-MCNC: 0.62 MG/DL (ref 0.5–1.4)
EOSINOPHIL # BLD AUTO: 0.05 K/UL (ref 0–0.51)
EOSINOPHIL NFR BLD: 0.9 % (ref 0–6.9)
ERYTHROCYTE [DISTWIDTH] IN BLOOD BY AUTOMATED COUNT: 40.3 FL (ref 35.9–50)
GFR SERPLBLD CREATININE-BSD FMLA CKD-EPI: 127 ML/MIN/1.73 M 2
GLOBULIN SER CALC-MCNC: 2.8 G/DL (ref 1.9–3.5)
GLUCOSE SERPL-MCNC: 91 MG/DL (ref 65–99)
HCG SERPL QL: NEGATIVE
HCT VFR BLD AUTO: 39.8 % (ref 37–47)
HGB BLD-MCNC: 13.1 G/DL (ref 12–16)
IMM GRANULOCYTES # BLD AUTO: 0.01 K/UL (ref 0–0.11)
IMM GRANULOCYTES NFR BLD AUTO: 0.2 % (ref 0–0.9)
LYMPHOCYTES # BLD AUTO: 1.75 K/UL (ref 1–4.8)
LYMPHOCYTES NFR BLD: 30 % (ref 22–41)
MCH RBC QN AUTO: 28.7 PG (ref 27–33)
MCHC RBC AUTO-ENTMCNC: 32.9 G/DL (ref 33.6–35)
MCV RBC AUTO: 87.3 FL (ref 81.4–97.8)
MONOCYTES # BLD AUTO: 0.37 K/UL (ref 0–0.85)
MONOCYTES NFR BLD AUTO: 6.3 % (ref 0–13.4)
NEUTROPHILS # BLD AUTO: 3.64 K/UL (ref 2–7.15)
NEUTROPHILS NFR BLD: 62.3 % (ref 44–72)
NRBC # BLD AUTO: 0 K/UL
NRBC BLD-RTO: 0 /100 WBC
PLATELET # BLD AUTO: 310 K/UL (ref 164–446)
PMV BLD AUTO: 10.1 FL (ref 9–12.9)
POTASSIUM SERPL-SCNC: 4.4 MMOL/L (ref 3.6–5.5)
PROT SERPL-MCNC: 7.4 G/DL (ref 6–8.2)
RBC # BLD AUTO: 4.56 M/UL (ref 4.2–5.4)
SODIUM SERPL-SCNC: 139 MMOL/L (ref 135–145)
WBC # BLD AUTO: 5.8 K/UL (ref 4.8–10.8)

## 2022-05-10 PROCEDURE — 85025 COMPLETE CBC W/AUTO DIFF WBC: CPT

## 2022-05-10 PROCEDURE — 80053 COMPREHEN METABOLIC PANEL: CPT

## 2022-05-10 PROCEDURE — 84703 CHORIONIC GONADOTROPIN ASSAY: CPT

## 2022-05-10 PROCEDURE — 36415 COLL VENOUS BLD VENIPUNCTURE: CPT

## 2022-05-10 ASSESSMENT — FIBROSIS 4 INDEX: FIB4 SCORE: 0.43

## 2022-05-24 ENCOUNTER — ANESTHESIA EVENT (OUTPATIENT)
Dept: SURGERY | Facility: MEDICAL CENTER | Age: 27
End: 2022-05-24
Payer: OTHER GOVERNMENT

## 2022-05-24 ENCOUNTER — HOSPITAL ENCOUNTER (OUTPATIENT)
Facility: MEDICAL CENTER | Age: 27
End: 2022-05-24
Attending: SURGERY | Admitting: SURGERY
Payer: OTHER GOVERNMENT

## 2022-05-24 ENCOUNTER — ANESTHESIA (OUTPATIENT)
Dept: SURGERY | Facility: MEDICAL CENTER | Age: 27
End: 2022-05-24
Payer: OTHER GOVERNMENT

## 2022-05-24 VITALS
HEIGHT: 68 IN | TEMPERATURE: 97.3 F | OXYGEN SATURATION: 98 % | DIASTOLIC BLOOD PRESSURE: 93 MMHG | WEIGHT: 169.97 LBS | SYSTOLIC BLOOD PRESSURE: 129 MMHG | HEART RATE: 75 BPM | RESPIRATION RATE: 18 BRPM | BODY MASS INDEX: 25.76 KG/M2

## 2022-05-24 DIAGNOSIS — N63.10 BREAST MASS, RIGHT: ICD-10-CM

## 2022-05-24 LAB
HCG UR QL: NEGATIVE
PATHOLOGY CONSULT NOTE: NORMAL

## 2022-05-24 PROCEDURE — 160025 RECOVERY II MINUTES (STATS): Performed by: SURGERY

## 2022-05-24 PROCEDURE — 160035 HCHG PACU - 1ST 60 MINS PHASE I: Performed by: SURGERY

## 2022-05-24 PROCEDURE — 700101 HCHG RX REV CODE 250: Performed by: SURGERY

## 2022-05-24 PROCEDURE — 700111 HCHG RX REV CODE 636 W/ 250 OVERRIDE (IP): Performed by: ANESTHESIOLOGY

## 2022-05-24 PROCEDURE — 700105 HCHG RX REV CODE 258: Performed by: SURGERY

## 2022-05-24 PROCEDURE — 501838 HCHG SUTURE GENERAL: Performed by: SURGERY

## 2022-05-24 PROCEDURE — 160039 HCHG SURGERY MINUTES - EA ADDL 1 MIN LEVEL 3: Performed by: SURGERY

## 2022-05-24 PROCEDURE — 700101 HCHG RX REV CODE 250: Performed by: ANESTHESIOLOGY

## 2022-05-24 PROCEDURE — 160028 HCHG SURGERY MINUTES - 1ST 30 MINS LEVEL 3: Performed by: SURGERY

## 2022-05-24 PROCEDURE — 160002 HCHG RECOVERY MINUTES (STAT): Performed by: SURGERY

## 2022-05-24 PROCEDURE — 160048 HCHG OR STATISTICAL LEVEL 1-5: Performed by: SURGERY

## 2022-05-24 PROCEDURE — 500054 HCHG BANDAGE, ELASTIC 6: Performed by: SURGERY

## 2022-05-24 PROCEDURE — 00400 ANES INTEGUMENTARY SYS NOS: CPT | Performed by: ANESTHESIOLOGY

## 2022-05-24 PROCEDURE — 81025 URINE PREGNANCY TEST: CPT

## 2022-05-24 PROCEDURE — 160046 HCHG PACU - 1ST 60 MINS PHASE II: Performed by: SURGERY

## 2022-05-24 PROCEDURE — 160009 HCHG ANES TIME/MIN: Performed by: SURGERY

## 2022-05-24 PROCEDURE — 88305 TISSUE EXAM BY PATHOLOGIST: CPT

## 2022-05-24 RX ORDER — HYDROMORPHONE HYDROCHLORIDE 1 MG/ML
0.5 INJECTION, SOLUTION INTRAMUSCULAR; INTRAVENOUS; SUBCUTANEOUS
Status: DISCONTINUED | OUTPATIENT
Start: 2022-05-24 | End: 2022-05-24 | Stop reason: HOSPADM

## 2022-05-24 RX ORDER — ONDANSETRON 2 MG/ML
INJECTION INTRAMUSCULAR; INTRAVENOUS PRN
Status: DISCONTINUED | OUTPATIENT
Start: 2022-05-24 | End: 2022-05-24 | Stop reason: SURG

## 2022-05-24 RX ORDER — SODIUM CHLORIDE, SODIUM LACTATE, POTASSIUM CHLORIDE, CALCIUM CHLORIDE 600; 310; 30; 20 MG/100ML; MG/100ML; MG/100ML; MG/100ML
INJECTION, SOLUTION INTRAVENOUS CONTINUOUS
Status: DISCONTINUED | OUTPATIENT
Start: 2022-05-24 | End: 2022-05-24 | Stop reason: HOSPADM

## 2022-05-24 RX ORDER — BUPIVACAINE HYDROCHLORIDE AND EPINEPHRINE 5; 5 MG/ML; UG/ML
INJECTION, SOLUTION EPIDURAL; INTRACAUDAL; PERINEURAL
Status: DISCONTINUED | OUTPATIENT
Start: 2022-05-24 | End: 2022-05-24 | Stop reason: HOSPADM

## 2022-05-24 RX ORDER — DEXAMETHASONE SODIUM PHOSPHATE 4 MG/ML
INJECTION, SOLUTION INTRA-ARTICULAR; INTRALESIONAL; INTRAMUSCULAR; INTRAVENOUS; SOFT TISSUE PRN
Status: DISCONTINUED | OUTPATIENT
Start: 2022-05-24 | End: 2022-05-24 | Stop reason: SURG

## 2022-05-24 RX ORDER — MIDAZOLAM HYDROCHLORIDE 1 MG/ML
INJECTION INTRAMUSCULAR; INTRAVENOUS PRN
Status: DISCONTINUED | OUTPATIENT
Start: 2022-05-24 | End: 2022-05-24 | Stop reason: SURG

## 2022-05-24 RX ORDER — LIDOCAINE HYDROCHLORIDE 10 MG/ML
INJECTION, SOLUTION INFILTRATION; PERINEURAL PRN
Status: DISCONTINUED | OUTPATIENT
Start: 2022-05-24 | End: 2022-05-24 | Stop reason: SURG

## 2022-05-24 RX ORDER — NORGESTIMATE AND ETHINYL ESTRADIOL 7DAYSX3 28
1 KIT ORAL DAILY
COMMUNITY
End: 2022-09-02

## 2022-05-24 RX ORDER — BUPIVACAINE HYDROCHLORIDE 5 MG/ML
INJECTION, SOLUTION EPIDURAL; INTRACAUDAL
Status: DISCONTINUED
Start: 2022-05-24 | End: 2022-05-24 | Stop reason: HOSPADM

## 2022-05-24 RX ORDER — CEFAZOLIN SODIUM 1 G/3ML
INJECTION, POWDER, FOR SOLUTION INTRAMUSCULAR; INTRAVENOUS PRN
Status: DISCONTINUED | OUTPATIENT
Start: 2022-05-24 | End: 2022-05-24 | Stop reason: SURG

## 2022-05-24 RX ORDER — HALOPERIDOL 5 MG/ML
1 INJECTION INTRAMUSCULAR
Status: DISCONTINUED | OUTPATIENT
Start: 2022-05-24 | End: 2022-05-24 | Stop reason: HOSPADM

## 2022-05-24 RX ORDER — ALBUTEROL SULFATE 2.5 MG/3ML
2.5 SOLUTION RESPIRATORY (INHALATION)
Status: DISCONTINUED | OUTPATIENT
Start: 2022-05-24 | End: 2022-05-24 | Stop reason: HOSPADM

## 2022-05-24 RX ORDER — DIPHENHYDRAMINE HYDROCHLORIDE 50 MG/ML
12.5 INJECTION INTRAMUSCULAR; INTRAVENOUS
Status: DISCONTINUED | OUTPATIENT
Start: 2022-05-24 | End: 2022-05-24 | Stop reason: HOSPADM

## 2022-05-24 RX ORDER — MEPERIDINE HYDROCHLORIDE 25 MG/ML
12.5 INJECTION INTRAMUSCULAR; INTRAVENOUS; SUBCUTANEOUS
Status: DISCONTINUED | OUTPATIENT
Start: 2022-05-24 | End: 2022-05-24 | Stop reason: HOSPADM

## 2022-05-24 RX ORDER — KETAMINE HYDROCHLORIDE 50 MG/ML
INJECTION, SOLUTION INTRAMUSCULAR; INTRAVENOUS PRN
Status: DISCONTINUED | OUTPATIENT
Start: 2022-05-24 | End: 2022-05-24 | Stop reason: SURG

## 2022-05-24 RX ORDER — HYDROCODONE BITARTRATE AND ACETAMINOPHEN 5; 325 MG/1; MG/1
1 TABLET ORAL EVERY 4 HOURS PRN
Qty: 12 TABLET | Refills: 0 | Status: SHIPPED | OUTPATIENT
Start: 2022-05-24 | End: 2022-05-27

## 2022-05-24 RX ORDER — OXYCODONE HCL 5 MG/5 ML
5 SOLUTION, ORAL ORAL
Status: DISCONTINUED | OUTPATIENT
Start: 2022-05-24 | End: 2022-05-24 | Stop reason: HOSPADM

## 2022-05-24 RX ORDER — HYDROMORPHONE HYDROCHLORIDE 1 MG/ML
0.4 INJECTION, SOLUTION INTRAMUSCULAR; INTRAVENOUS; SUBCUTANEOUS
Status: DISCONTINUED | OUTPATIENT
Start: 2022-05-24 | End: 2022-05-24 | Stop reason: HOSPADM

## 2022-05-24 RX ORDER — EPINEPHRINE 1 MG/ML(1)
AMPUL (ML) INJECTION
Status: DISCONTINUED
Start: 2022-05-24 | End: 2022-05-24 | Stop reason: HOSPADM

## 2022-05-24 RX ORDER — OXYCODONE HCL 5 MG/5 ML
10 SOLUTION, ORAL ORAL
Status: DISCONTINUED | OUTPATIENT
Start: 2022-05-24 | End: 2022-05-24 | Stop reason: HOSPADM

## 2022-05-24 RX ORDER — HYDROMORPHONE HYDROCHLORIDE 1 MG/ML
0.2 INJECTION, SOLUTION INTRAMUSCULAR; INTRAVENOUS; SUBCUTANEOUS
Status: DISCONTINUED | OUTPATIENT
Start: 2022-05-24 | End: 2022-05-24 | Stop reason: HOSPADM

## 2022-05-24 RX ORDER — HYDRALAZINE HYDROCHLORIDE 20 MG/ML
5 INJECTION INTRAMUSCULAR; INTRAVENOUS
Status: DISCONTINUED | OUTPATIENT
Start: 2022-05-24 | End: 2022-05-24 | Stop reason: HOSPADM

## 2022-05-24 RX ADMIN — ONDANSETRON 4 MG: 2 INJECTION INTRAMUSCULAR; INTRAVENOUS at 08:53

## 2022-05-24 RX ADMIN — LIDOCAINE HYDROCHLORIDE 40 MG: 10 INJECTION, SOLUTION INFILTRATION; PERINEURAL at 08:21

## 2022-05-24 RX ADMIN — DEXAMETHASONE SODIUM PHOSPHATE 8 MG: 4 INJECTION, SOLUTION INTRA-ARTICULAR; INTRALESIONAL; INTRAMUSCULAR; INTRAVENOUS; SOFT TISSUE at 08:24

## 2022-05-24 RX ADMIN — SODIUM CHLORIDE, POTASSIUM CHLORIDE, SODIUM LACTATE AND CALCIUM CHLORIDE: 600; 310; 30; 20 INJECTION, SOLUTION INTRAVENOUS at 06:38

## 2022-05-24 RX ADMIN — FENTANYL CITRATE 50 MCG: 50 INJECTION, SOLUTION INTRAMUSCULAR; INTRAVENOUS at 08:21

## 2022-05-24 RX ADMIN — PROPOFOL 150 MG: 10 INJECTION, EMULSION INTRAVENOUS at 08:21

## 2022-05-24 RX ADMIN — CEFAZOLIN 2 G: 330 INJECTION, POWDER, FOR SOLUTION INTRAMUSCULAR; INTRAVENOUS at 08:21

## 2022-05-24 RX ADMIN — KETAMINE HYDROCHLORIDE 50 MG: 50 INJECTION INTRAMUSCULAR; INTRAVENOUS at 08:21

## 2022-05-24 RX ADMIN — FENTANYL CITRATE 50 MCG: 50 INJECTION, SOLUTION INTRAMUSCULAR; INTRAVENOUS at 08:39

## 2022-05-24 RX ADMIN — MIDAZOLAM HYDROCHLORIDE 2 MG: 1 INJECTION, SOLUTION INTRAMUSCULAR; INTRAVENOUS at 08:16

## 2022-05-24 ASSESSMENT — FIBROSIS 4 INDEX
FIB4 SCORE: 0.17
FIB4 SCORE: 0.17

## 2022-05-24 ASSESSMENT — PAIN DESCRIPTION - PAIN TYPE
TYPE: SURGICAL PAIN

## 2022-05-24 NOTE — ANESTHESIA TIME REPORT
Anesthesia Start and Stop Event Times     Date Time Event    5/24/2022 0816 Ready for Procedure     0816 Anesthesia Start     0912 Anesthesia Stop        Responsible Staff  05/24/22    Name Role Begin End    Jaime Childress M.D. Anesth 0816 [id number]        Overtime Reason:  no overtime (within assigned shift)    Comments:

## 2022-05-24 NOTE — ANESTHESIA PROCEDURE NOTES
Airway    Date/Time: 5/24/2022 8:21 AM  Performed by: Jaime Childress M.D.  Authorized by: Jaime Childress M.D.     Location:  OR  Urgency:  Elective  Difficult Airway: No    Indications for Airway Management:  Anesthesia      Spontaneous Ventilation: absent    Sedation Level:  Deep  Preoxygenated: Yes    Mask Difficulty Assessment:  0 - not attempted  Final Airway Type:  Supraglottic airway  Final Supraglottic Airway:  Standard LMA    SGA Size:  3  Number of Attempts at Approach:  1

## 2022-05-24 NOTE — DISCHARGE INSTRUCTIONS
ACTIVITY: Rest and take it easy for the first 24 hours.  A responsible adult is recommended to remain with you during that time.  It is normal to feel sleepy.  We encourage you to not do anything that requires balance, judgment or coordination.    MILD FLU-LIKE SYMPTOMS ARE NORMAL. YOU MAY EXPERIENCE GENERALIZED MUSCLE ACHES, THROAT IRRITATION, HEADACHE AND/OR SOME NAUSEA.    FOR 24 HOURS DO NOT:  Drive, operate machinery or run household appliances.  Drink beer or alcoholic beverages.   Make important decisions or sign legal documents.    SPECIAL INSTRUCTIONS: Remove ACE wrap in 24 hours   Remove plastic and gauze in 3 days   Leave underlying steristips on until they fall off   Patient may shower on Post OP Day #2    DIET: To avoid nausea, slowly advance diet as tolerated, avoiding spicy or greasy foods for the first day.  Add more substantial food to your diet according to your physician's instructions.  Babies can be fed formula or breast milk as soon as they are hungry.  INCREASE FLUIDS AND FIBER TO AVOID CONSTIPATION.    SURGICAL DRESSING/BATHING: Remove ACE wrap in 24 hours   Remove plastic and gauze in 3 days   Leave underlying steristips on until they fall off   Patient may shower on Post OP Day #2    FOLLOW-UP APPOINTMENT:  A follow-up appointment should be arranged with your doctor in [phone]; call to schedule.    You should CALL YOUR PHYSICIAN if you develop:  Fever greater than 101 degrees F.  Pain not relieved by medication, or persistent nausea or vomiting.  Excessive bleeding (blood soaking through dressing) or unexpected drainage from the wound.  Extreme redness or swelling around the incision site, drainage of pus or foul smelling drainage.  Inability to urinate or empty your bladder within 8 hours.  Problems with breathing or chest pain.    You should call 911 if you develop problems with breathing or chest pain.  If you are unable to contact your doctor or surgical center, you should go to  the nearest emergency room or urgent care center.  Physician's telephone #: [phone]    If any questions arise, call your doctor.  If your doctor is not available, please feel free to call the Surgical Center at [phone].     A registered nurse may call you a few days after your surgery to see how you are doing after your procedure.    MEDICATIONS: Resume taking daily medication.  Take prescribed pain medication with food.  If no medication is prescribed, you may take non-aspirin pain medication if needed.  PAIN MEDICATION CAN BE VERY CONSTIPATING.  Take a stool softener or laxative such as senokot, pericolace, or milk of magnesia if needed.  Prescription given for HYDROcodone-acetaminophen (Norco)    Last pain medication given at none.    If your physician has prescribed pain medication that includes Acetaminophen (Tylenol), do not take additional Acetaminophen (Tylenol) while taking the prescribed medication.    Depression / Suicide Risk    As you are discharged from this [facility] facility, it is important to learn how to keep safe from harming yourself.    Recognize the warning signs:  Abrupt changes in personality, positive or negative- including increase in energy   Giving away possessions  Change in eating patterns- significant weight changes-  positive or negative  Change in sleeping patterns- unable to sleep or sleeping all the time   Unwillingness or inability to communicate  Depression  Unusual sadness, discouragement and loneliness  Talk of wanting to die  Neglect of personal appearance   Rebelliousness- reckless behavior  Withdrawal from people/activities they love  Confusion- inability to concentrate     If you or a loved one observes any of these behaviors or has concerns about self-harm, here's what you can do:  Talk about it- your feelings and reasons for harming yourself  Remove any means that you might use to hurt yourself (examples: pills, rope, extension cords, firearm)  Get  professional help from the community (Mental Health, Substance Abuse, psychological counseling)  Do not be alone:Call your Safe Contact- someone whom you trust who will be there for you.  Call your local CRISIS HOTLINE 753-0011 or 825-536-9215  Call your local Children's Mobile Crisis Response Team [place] (902) 187-5575 or www.[employer]  Call the toll free National Suicide Prevention Hotlines   National Suicide Prevention Lifeline 730-077-FXDZ (9490)  [employer] Line Network 800-SUICIDE (748-8528)

## 2022-05-24 NOTE — ANESTHESIA PREPROCEDURE EVALUATION
Case: [id number] Date/Time: 05/24/22 0815    Procedure: EXCISIONAL BIOPSY, BREAST (Right Breast)    Anesthesia type: General    Pre-op diagnosis: FIBROADENOMA OF RIGHT BREAST    Location: [facility] ROOM 25 / SURGERY SAME DAY [facility]    Surgeons: Carson Tineo M.D.          Relevant Problems   Other   (positive) ADHD (attention deficit hyperactivity disorder), combined type   (positive) Bipolar disorder, unspecified (HCC)   (positive) Generalized anxiety disorder   (positive) Hirsutism   (positive) PTSD (post-traumatic stress disorder)     Anes H&P:  PAST MEDICAL HISTORY:   26 y.o. female who presents for Procedure(s) (LRB):  EXCISIONAL BIOPSY, BREAST (Right).  She has current and past medical problems significant for:    Past Medical History:   Diagnosis Date   • Anxiety    • Depression 05/10/2022    ADHD/bi-polar       SMOKING/ALCOHOL/RECREATIONAL DRUG USE:  Social History     Tobacco Use   • Smoking status: Never Smoker   • Smokeless tobacco: Never Used   Vaping Use   • Vaping Use: Never used   Substance Use Topics   • Alcohol use: Not Currently     Comment: once a month   • Drug use: Yes     Types: Marijuana, Inhaled     Comment: marijuana     Social History     Substance and Sexual Activity   Drug Use Yes   • Types: Marijuana, Inhaled    Comment: marijuana       PAST SURGICAL HISTORY:  Past Surgical History:   Procedure Laterality Date   • OTHER  2021    ablation - neck       ALLERGIES:   No Known Allergies    MEDICATIONS:  No current facility-administered medications on file prior to encounter.     Current Outpatient Medications on File Prior to Encounter   Medication Sig Dispense Refill   • Norgestim-Eth Estrad Triphasic (TRI-SPRINTEC) 0.18/0.215/0.25 MG-35 MCG Tab Take 1 Tablet by mouth every day.     • [START ON 7/7/2022] amphetamine-dextroamphetamine (ADDERALL XR) 15 MG XR capsule Take 1 Capsule by mouth 2 times a day for 30 days. Take second dose prior to 2 PM. 60 Capsule 0   • ARIPiprazole (ABILIFY) 5 MG tablet  Take 1 Tablet by mouth every day. 90 Tablet 0       LABS:  Lab Results   Component Value Date/Time    HEMOGLOBIN 13.1 05/10/2022 0927    HEMATOCRIT 39.8 05/10/2022 0927    WBC 5.8 05/10/2022 0927     Lab Results   Component Value Date/Time    SODIUM 139 05/10/2022 0927    POTASSIUM 4.4 05/10/2022 0927    CHLORIDE 102 05/10/2022 0927    CO2 23 05/10/2022 0927    GLUCOSE 91 05/10/2022 0927    BUN 6 (L) 05/10/2022 0927    CALCIUM 9.6 05/10/2022 0927         PREVIOUS ANESTHETICS: See EMR  __________________________________________      Physical Exam    Airway   Mallampati: I  TM distance: >3 FB  Neck ROM: full       Cardiovascular - normal exam  Rhythm: regular  Rate: normal  (-) murmur     Dental - normal exam           Pulmonary - normal exam  Breath sounds clear to auscultation     Abdominal   (-) obese     Neurological - normal exam                 Anesthesia Plan    ASA 1       Plan - general       Airway plan will be LMA          Induction: intravenous    Postoperative Plan: Postoperative administration of opioids is intended.    Pertinent diagnostic labs and testing reviewed    Informed Consent:    Anesthetic plan and risks discussed with patient and spouse.    Use of blood products discussed with: patient and spouse whom consented to blood products.

## 2022-05-24 NOTE — OR SURGEON
Immediate Post OP Note    PreOp Diagnosis: Right Breast Mass      PostOp Diagnosis: same (fibroadenomatoid)      Procedure(s):  EXCISIONAL BIOPSY, RIGHT  BREAST - Wound Class: Clean    Surgeon(s):  Carson Tineo M.D.    Anesthesiologist/Type of Anesthesia:  Anesthesiologist: Jaime Childress M.D./General    Surgical Staff:  Assistant: WARREN Cherry  Circulator: Hanna Mckinney R.N.  Scrub Person: Janelle Ly    Specimens removed if any:  ID Type Source Tests Collected by Time Destination   A : Right breast short stitch superior long stitch lateral black suture anterior Tissue Breast PATHOLOGY SPECIMEN Carson Tineo M.D. 5/24/2022 0834        Estimated Blood Loss: minimal    Findings: firm 2.5cm fibroadenomatoid mass of right upper breast    Complications: no apparent complications        5/24/2022 9:19 AM Carson Tineo M.D.

## 2022-05-24 NOTE — ANESTHESIA POSTPROCEDURE EVALUATION
Patient: Edward Gan    Procedure Summary     Date: 05/24/22 Room / Location: [facility] ROOM 25 / SURGERY SAME DAY [facility]    Anesthesia Start: 0816 Anesthesia Stop: 0912    Procedure: EXCISIONAL BIOPSY, BREAST (Right Breast) Diagnosis: (FIBROADENOMA OF RIGHT BREAST)    Surgeons: Carson Tineo M.D. Responsible Provider: Jaime Childress M.D.    Anesthesia Type: general ASA Status: 1          Final Anesthesia Type: general  Last vitals  BP   Blood Pressure: (!) 129/93    Temp   36.3 °C (97.3 °F)    Pulse   75   Resp   18    SpO2   98 %      Anesthesia Post Evaluation    Patient location during evaluation: PACU  Patient participation: complete - patient participated  Level of consciousness: sleepy but conscious    Airway patency: patent  Anesthetic complications: no  Cardiovascular status: hemodynamically stable  Respiratory status: acceptable  Hydration status: balanced    PONV: none          No complications documented.     Nurse Pain Score: 0 (NPRS)

## 2022-05-24 NOTE — OP REPORT
DATE OF SERVICE:  05/24/2022     SURGEON:  Carson Tineo MD     ASSISTANT:  JONAS Jovel     TYPE OF ANESTHESIA:  General anesthetic using laryngeal mask airway plus local   0.5% Marcaine with epinephrine.     PREOPERATIVE DIAGNOSIS:  Right superior breast mass with atypical findings on   ultrasound.     POSTOPERATIVE DIAGNOSIS:  Right superior breast mass with atypical findings on   ultrasound.     PROCEDURE:  Right breast excisional biopsy.     FINDINGS:  The patient is a 26-year-old female patient who presents with a   palpable mass in the right upper breast which she recently discovered on   examination.  She had an ultrasound which revealed a lobulated mass that was 2   cm in size at the 12 o'clock position of the right breast about 6 cm from the   nipple.  A biopsy was then done, which revealed fragments of the   fibroadenoma.  There was no atypia, but it was noted that the patient did have   a lobulated type of mass.  She was recommended to undergo surgical   evaluation.  After discussion and reviewing guidelines, she elected to proceed   with an excision of the mass to be sure this was not a carcinoma.  At   surgery, she underwent an excision of the right fibroadenoma type mass without   complications.  Final pathology is pending.     FINDINGS AND PROCEDURE:  The patient was brought to the operating room and   placed on the table in supine position.  General anesthetic was then provided   by the anesthesiologist.  A timeout was called.  The correct patient, correct   diagnosis, correct surgery, and correct location of the surgery were discussed   and agreed upon.  She received preoperative IV antibiotics.  The right breast   area was then prepped and draped in the usual sterile fashion.  A combination   of palpation and ultrasound device was then used to identify the mass of   concern at the 11:30 position of the right breast.  A circumareolar incision   was then made from the 10 o'clock to 1  o'clock position in the areola with #15   blade through the skin and dermis.  All bleeding was controlled with   electrocautery.  Dissection was then carried down into the breast parenchyma.    Yo retractors and later Tran retractors were used to retract the skin   edges.  Dissection was then carried out superiorly into the breast through   this incision until the palpable mass was encountered.  A suture was placed   through the center of the mass in order to use this as a pulling device.  A   small rim of normal tissue was then dissected around the palpable mass   circumferentially using both the electrocautery device and scissors. The mass   was completely removed and sent to pathology for further characterization.  It   was oriented with sutures.  The remaining breast tissue was then irrigated   and injected with 0.5% Marcaine.  The breast tissue was mobilized off the   pectoralis major muscle and then brought back together with 3-0 Vicryl   sutures.  The dermis was closed using running 3-0 Vicryl suture.  The skin was   closed using running 4-0 Monocryl suture in subcuticular fashion.    Steri-Strips and sterile dressing applied.  Final sponge and needle count were   correct and there were no apparent complications of the surgery.     An assistant was utilized for the surgery, the assistant assisted with   retraction of the skin flaps so that the dissection of the breast mass could   be performed and assisted with the multilayer closure.        ______________________________  MD ANEESH MCGRATH/IRVING    DD:  05/24/2022 15:54  DT:  05/24/2022 16:47    Job#:  [id number]    CC:Quintin Reagan MD(User)

## 2022-05-24 NOTE — OR NURSING
0908 Patient arrived from OR. ID verified. Report received. Attached to monitors. Patient sleepy easy to arouse. No distress noted. 4 L 02 via mask. Vital signs stable.   0931 Patient tolerating po intake.   0940 called and updated  plan of care.   1000 Criteria met to transition patient to stage 2 recovery  1002 Discharge instructions given to patient and  both verbalize understanding of the orders. Copy of instructions given to .   1004 Criteria met to discharge patient home.  1008 Patient escorted via walking with all her personal belongings.

## 2022-06-02 ENCOUNTER — TELEMEDICINE (OUTPATIENT)
Dept: BEHAVIORAL HEALTH | Facility: CLINIC | Age: 27
End: 2022-06-02
Payer: OTHER GOVERNMENT

## 2022-06-02 DIAGNOSIS — F41.1 GENERALIZED ANXIETY DISORDER: ICD-10-CM

## 2022-06-02 DIAGNOSIS — F43.10 PTSD (POST-TRAUMATIC STRESS DISORDER): ICD-10-CM

## 2022-06-02 DIAGNOSIS — F90.2 ADHD (ATTENTION DEFICIT HYPERACTIVITY DISORDER), COMBINED TYPE: ICD-10-CM

## 2022-06-02 DIAGNOSIS — F31.76 BIPOLAR DISORDER, IN FULL REMISSION, MOST RECENT EPISODE DEPRESSED (HCC): ICD-10-CM

## 2022-06-02 PROCEDURE — 99214 OFFICE O/P EST MOD 30 MIN: CPT | Mod: 95 | Performed by: PSYCHIATRY & NEUROLOGY

## 2022-06-02 RX ORDER — ARIPIPRAZOLE 5 MG/1
TABLET ORAL
COMMUNITY
End: 2022-06-02

## 2022-06-02 RX ORDER — ARIPIPRAZOLE 5 MG/1
2.5 TABLET ORAL DAILY
Qty: 90 TABLET | Refills: 0
Start: 2022-06-02 | End: 2022-09-02
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
## 2022-06-17 ENCOUNTER — PATIENT MESSAGE (OUTPATIENT)
Dept: BEHAVIORAL HEALTH | Facility: CLINIC | Age: 27
End: 2022-06-17
Payer: OTHER GOVERNMENT

## 2022-06-17 DIAGNOSIS — F90.2 ADHD (ATTENTION DEFICIT HYPERACTIVITY DISORDER), COMBINED TYPE: ICD-10-CM

## 2022-06-17 RX ORDER — DEXTROAMPHETAMINE SACCHARATE, AMPHETAMINE ASPARTATE MONOHYDRATE, DEXTROAMPHETAMINE SULFATE AND AMPHETAMINE SULFATE 3.75; 3.75; 3.75; 3.75 MG/1; MG/1; MG/1; MG/1
15 CAPSULE, EXTENDED RELEASE ORAL 2 TIMES DAILY
Qty: 60 CAPSULE | Refills: 0 | Status: CANCELLED
Start: 2022-07-07 | End: 2022-08-06

## 2022-06-17 RX ORDER — DEXTROAMPHETAMINE SACCHARATE, AMPHETAMINE ASPARTATE MONOHYDRATE, DEXTROAMPHETAMINE SULFATE AND AMPHETAMINE SULFATE 3.75; 3.75; 3.75; 3.75 MG/1; MG/1; MG/1; MG/1
15 CAPSULE, EXTENDED RELEASE ORAL 2 TIMES DAILY
Qty: 60 CAPSULE | Refills: 0 | Status: SHIPPED | OUTPATIENT
Start: 2022-06-17 | End: 2022-07-17

## 2022-09-02 ENCOUNTER — TELEMEDICINE (OUTPATIENT)
Dept: BEHAVIORAL HEALTH | Facility: CLINIC | Age: 27
End: 2022-09-02
Payer: OTHER GOVERNMENT

## 2022-09-02 DIAGNOSIS — F41.1 GENERALIZED ANXIETY DISORDER: ICD-10-CM

## 2022-09-02 DIAGNOSIS — F31.76 BIPOLAR DISORDER, IN FULL REMISSION, MOST RECENT EPISODE DEPRESSED (HCC): ICD-10-CM

## 2022-09-02 DIAGNOSIS — F43.10 PTSD (POST-TRAUMATIC STRESS DISORDER): ICD-10-CM

## 2022-09-02 DIAGNOSIS — F90.2 ADHD (ATTENTION DEFICIT HYPERACTIVITY DISORDER), COMBINED TYPE: ICD-10-CM

## 2022-09-02 PROCEDURE — 99214 OFFICE O/P EST MOD 30 MIN: CPT | Mod: 95 | Performed by: PSYCHIATRY & NEUROLOGY

## 2022-09-02 RX ORDER — DEXTROAMPHETAMINE SACCHARATE, AMPHETAMINE ASPARTATE MONOHYDRATE, DEXTROAMPHETAMINE SULFATE AND AMPHETAMINE SULFATE 3.75; 3.75; 3.75; 3.75 MG/1; MG/1; MG/1; MG/1
15 CAPSULE, EXTENDED RELEASE ORAL 2 TIMES DAILY
Qty: 60 CAPSULE | Refills: 0 | Status: SHIPPED | OUTPATIENT
Start: 2022-10-30 | End: 2022-11-29

## 2022-09-02 RX ORDER — ARIPIPRAZOLE 2 MG/1
2 TABLET ORAL DAILY
Qty: 90 TABLET | Refills: 0 | Status: SHIPPED | OUTPATIENT
Start: 2022-09-02 | End: 2022-11-29 | Stop reason: SDUPTHER

## 2022-09-02 RX ORDER — DEXTROAMPHETAMINE SACCHARATE, AMPHETAMINE ASPARTATE MONOHYDRATE, DEXTROAMPHETAMINE SULFATE AND AMPHETAMINE SULFATE 3.75; 3.75; 3.75; 3.75 MG/1; MG/1; MG/1; MG/1
15 CAPSULE, EXTENDED RELEASE ORAL 2 TIMES DAILY
Qty: 60 CAPSULE | Refills: 0 | Status: SHIPPED | OUTPATIENT
Start: 2022-10-01 | End: 2022-10-24 | Stop reason: SDUPTHER

## 2022-09-02 RX ORDER — DROSPIRENONE AND ETHINYL ESTRADIOL TABLETS 0.02-3(28)
1 KIT ORAL DAILY
COMMUNITY
Start: 2022-06-23

## 2022-09-02 RX ORDER — DEXTROAMPHETAMINE SACCHARATE, AMPHETAMINE ASPARTATE MONOHYDRATE, DEXTROAMPHETAMINE SULFATE AND AMPHETAMINE SULFATE 3.75; 3.75; 3.75; 3.75 MG/1; MG/1; MG/1; MG/1
15 CAPSULE, EXTENDED RELEASE ORAL 2 TIMES DAILY
Qty: 60 CAPSULE | Refills: 0 | Status: SHIPPED | OUTPATIENT
Start: 2022-09-02 | End: 2022-10-02
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
## 2022-10-24 ENCOUNTER — PATIENT MESSAGE (OUTPATIENT)
Dept: BEHAVIORAL HEALTH | Facility: CLINIC | Age: 27
End: 2022-10-24
Payer: OTHER GOVERNMENT

## 2022-10-24 DIAGNOSIS — F90.2 ADHD (ATTENTION DEFICIT HYPERACTIVITY DISORDER), COMBINED TYPE: ICD-10-CM

## 2022-10-24 RX ORDER — DEXTROAMPHETAMINE SACCHARATE, AMPHETAMINE ASPARTATE MONOHYDRATE, DEXTROAMPHETAMINE SULFATE AND AMPHETAMINE SULFATE 3.75; 3.75; 3.75; 3.75 MG/1; MG/1; MG/1; MG/1
15 CAPSULE, EXTENDED RELEASE ORAL 2 TIMES DAILY
Qty: 60 CAPSULE | Refills: 0 | Status: SHIPPED | OUTPATIENT
Start: 2022-10-24 | End: 2022-10-25 | Stop reason: SDUPTHER

## 2022-10-25 DIAGNOSIS — F90.2 ADHD (ATTENTION DEFICIT HYPERACTIVITY DISORDER), COMBINED TYPE: ICD-10-CM

## 2022-10-25 RX ORDER — DEXTROAMPHETAMINE SACCHARATE, AMPHETAMINE ASPARTATE MONOHYDRATE, DEXTROAMPHETAMINE SULFATE AND AMPHETAMINE SULFATE 3.75; 3.75; 3.75; 3.75 MG/1; MG/1; MG/1; MG/1
15 CAPSULE, EXTENDED RELEASE ORAL 2 TIMES DAILY
Qty: 60 CAPSULE | Refills: 0 | OUTPATIENT
Start: 2022-10-25 | End: 2022-11-24

## 2022-10-26 ENCOUNTER — PHARMACY VISIT (OUTPATIENT)
Dept: PHARMACY | Facility: MEDICAL CENTER | Age: 27
End: 2022-10-26
Payer: COMMERCIAL

## 2022-10-26 PROCEDURE — RXMED WILLOW AMBULATORY MEDICATION CHARGE: Performed by: PSYCHIATRY & NEUROLOGY

## 2022-11-29 ENCOUNTER — TELEMEDICINE (OUTPATIENT)
Dept: BEHAVIORAL HEALTH | Facility: CLINIC | Age: 27
End: 2022-11-29
Payer: OTHER GOVERNMENT

## 2022-11-29 DIAGNOSIS — F43.10 PTSD (POST-TRAUMATIC STRESS DISORDER): ICD-10-CM

## 2022-11-29 DIAGNOSIS — F31.76 BIPOLAR DISORDER, IN FULL REMISSION, MOST RECENT EPISODE DEPRESSED (HCC): ICD-10-CM

## 2022-11-29 DIAGNOSIS — F90.2 ADHD (ATTENTION DEFICIT HYPERACTIVITY DISORDER), COMBINED TYPE: ICD-10-CM

## 2022-11-29 DIAGNOSIS — F41.1 GENERALIZED ANXIETY DISORDER: ICD-10-CM

## 2022-11-29 PROCEDURE — 99214 OFFICE O/P EST MOD 30 MIN: CPT | Mod: 95 | Performed by: PSYCHIATRY & NEUROLOGY

## 2022-11-29 RX ORDER — ARIPIPRAZOLE 2 MG/1
2 TABLET ORAL DAILY
Qty: 90 TABLET | Refills: 0 | Status: SHIPPED | OUTPATIENT
Start: 2022-11-29 | End: 2022-12-23 | Stop reason: SDUPTHER

## 2022-11-29 RX ORDER — METHYLPHENIDATE HYDROCHLORIDE 27 MG/1
27 TABLET ORAL EVERY MORNING
Qty: 30 TABLET | Refills: 0 | Status: SHIPPED | OUTPATIENT
Start: 2022-11-29 | End: 2022-11-30
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
## 2022-11-30 RX ORDER — METHYLPHENIDATE HYDROCHLORIDE 27 MG/1
27 TABLET ORAL EVERY MORNING
Qty: 30 TABLET | Refills: 0 | Status: SHIPPED | OUTPATIENT
Start: 2022-11-30 | End: 2022-12-30

## 2022-12-06 ENCOUNTER — DOCUMENTATION (OUTPATIENT)
Dept: BEHAVIORAL HEALTH | Facility: CLINIC | Age: 27
End: 2022-12-06
Payer: OTHER GOVERNMENT

## 2022-12-06 ENCOUNTER — TELEPHONE (OUTPATIENT)
Dept: BEHAVIORAL HEALTH | Facility: CLINIC | Age: 27
End: 2022-12-06
Payer: OTHER GOVERNMENT

## 2022-12-07 ENCOUNTER — DOCUMENTATION (OUTPATIENT)
Dept: BEHAVIORAL HEALTH | Facility: CLINIC | Age: 27
End: 2022-12-07
Payer: OTHER GOVERNMENT

## 2022-12-07 ENCOUNTER — TELEPHONE (OUTPATIENT)
Dept: BEHAVIORAL HEALTH | Facility: CLINIC | Age: 27
End: 2022-12-07
Payer: OTHER GOVERNMENT

## 2022-12-07 NOTE — PROGRESS NOTES
"Caller: Lg Gan    Pt's  states that she has not been taking her Abilify for months now, bottle is full. She has been in a pattern just like back in 2020 when she \"spiraled\". He does not need any information just thought he would let you know. Some of the signs include: unexplained weight loss 140 to 118lbs (2020), she has lost 20 lbs in the last month. When in emotional state she has distrust. Argumentative and feels like she is not being heard. She may have only been sleeping about 2 hours a night. She has a hard time completing tasks and school work. In 2020 she becomes hyper social where she posts to social media every 10 minutes. Also talks about God/Synagogue more than usual. (2020) a lot of shopping sprees. She bought a lot of \"random things\" at [employer] $500 worth of stuff and also at [employer]. Pt's  will drop off a letter tomorrow for you to see.    "

## 2022-12-07 NOTE — TELEPHONE ENCOUNTER
"Pt's  came to the office and dropped off a letter for Dr. Wright regarding Edward (letter scanned). Called pt to schedule an earlier appointment. She states she is fine. She does not feel comfortable coming back to [place]. She does not want to schedule at this time. She has insurance that will reimburse her if she needs to seek help in [place]. She said that her  \"is having a meltdown because his wife just left him.\"  Provider notified. No further action needed.  "

## 2022-12-23 ENCOUNTER — TELEMEDICINE (OUTPATIENT)
Dept: BEHAVIORAL HEALTH | Facility: CLINIC | Age: 27
End: 2022-12-23
Payer: OTHER GOVERNMENT

## 2022-12-23 DIAGNOSIS — F31.81 BIPOLAR 2 DISORDER (HCC): ICD-10-CM

## 2022-12-23 DIAGNOSIS — F90.2 ADHD (ATTENTION DEFICIT HYPERACTIVITY DISORDER), COMBINED TYPE: ICD-10-CM

## 2022-12-23 DIAGNOSIS — F41.1 GENERALIZED ANXIETY DISORDER: ICD-10-CM

## 2022-12-23 PROBLEM — F31.70 BIPOLAR DISORDER IN PARTIAL REMISSION (HCC): Status: ACTIVE | Noted: 2020-05-15

## 2022-12-23 PROCEDURE — 90836 PSYTX W PT W E/M 45 MIN: CPT | Mod: 95 | Performed by: PSYCHIATRY & NEUROLOGY

## 2022-12-23 PROCEDURE — 99214 OFFICE O/P EST MOD 30 MIN: CPT | Mod: 95 | Performed by: PSYCHIATRY & NEUROLOGY

## 2022-12-23 RX ORDER — LORAZEPAM 1 MG/1
1 TABLET ORAL 2 TIMES DAILY PRN
COMMUNITY
Start: 2022-12-16 | End: 2023-02-17

## 2022-12-23 RX ORDER — ARIPIPRAZOLE 5 MG/1
5 TABLET ORAL DAILY
Qty: 90 TABLET | Refills: 0 | Status: SHIPPED | OUTPATIENT
Start: 2022-12-23 | End: 2023-02-17

## 2022-12-23 NOTE — PROGRESS NOTES
PSYCHIATRY VIRTUAL VISIT FOLLOW-UP NOTE      Chief Complaint   Patient presents with    Follow-Up     Mood, anxiety     This evaluation was conducted via Zoom using secure and encrypted videoconferencing technology.   The patient was in their home in the [facility].    The patient's identity was confirmed and verbal consent was obtained for this virtual visit.    History Of Present Illness:  Edward Gan is a 27 y.o. female with bipolar mood disorder, generalized anxiety disorder, PTSD, ADHD comes in today for follow up, was last seen about 3 weeks ago.  I saw Edward initially by herself and then her  joined in for the rest of the appointment.  She has been having a difficult time since her last appointment with me.  She and her  have been having increased marital stress and she temporarily moved to [place] to live with her mother.  However, living with her mother was also another trigger which did not help her mental health.  She went to [facility] and was prescribed some Ativan for overwhelming anxiety.  She is back home and they both want to work on their marriage.  Her  had expressed concerns regarding his mental health and we were able to talk about it.  She mentions that she missed a couple of doses of Abilify but her  found an empty bottle of Abilify 5 mg which is a dose that she is no longer.  She was having a difficult time with school and her  had to talk to the team and get an incomplete for this semester.  Her  expressed concerns about her spending more money compared to before.  She does agree that when there is stress in her marriage is when she has increased difficulties in her mood and anxiety.  She is taking Abilify 5 mg twice daily to make her  happy.  She has not picked up Concerta from the pharmacy and is not taking Adderall on a consistent basis as well.  She denies having thoughts of wanting to hurt herself.    Social History:    She is , lives with  and 19 yo step son (with intellectual disability) in [place], step daughter lives in [place], full time student at [employer], took incomplete this semester, getting Bachelor's degree in History, mother and 2 younger siblings live in [place], father lives in [place], she and  own financial planning business.     Substance Use:  Alcohol - Infrequent alcohol use  Nicotine - Denies   Cannabis - Vapes CBD  Illicit drugs - Denies    Past Medication Trials:  Zoloft, Risperdal IM, Adderall XR 15 mg BID (effective, s/e - abdominal discomfort, decreased appetite)    Psychological testing with Dr. Emilia Park, Psy.D (11/3/2021): mild ADHD, combined type    Medications:  Current Outpatient Medications   Medication Sig Dispense Refill    methylphenidate (CONCERTA) 27 MG CR tablet Take 1 Tablet by mouth every morning for 30 days. 30 Tablet 0    ARIPiprazole (ABILIFY) 2 MG tablet Take 1 Tablet by mouth every day. 90 Tablet 0    LORYNA 3-0.02 MG per tablet Take 1 Tablet by mouth every day.       No current facility-administered medications for this visit.     Review Of Systems:    Psychiatric - Negative for depression.  Positive for anxiety     Physical Examination:  Vital signs: There were no vitals taken for this visit.    Musculoskeletal: No abnormal movements.     Mental Status Evaluation:   General: Young black female, dressed in casual attire, good grooming and hygiene, in no apparent distress, calm and cooperative, good eye contact, no psychomotor agitation or retardation  Orientation: Alert and oriented to person, place and time  Recent and remote memory: Grossly intact  Attention span and concentration: Grossly intact  Speech: Spontaneous, normal rate, rhythm and tone  Thought Process: Linear, logical and goal directed  Thought Content: Denies suicidal or homicidal ideations, intent or plan  Perception: No delusions noted  Associations: Intact  Language: Appropriate  Fund of  "knowledge and vocabulary: Grossly adequate  Mood: \"good\"  Affect: Anxious, mood congruent  Insight: Good  Judgment: Good    Depression screenin/15/2020 2022   Depression Screen (PHQ-2/PHQ-9)   PHQ-2 Total Score 1 0   PHQ-9 Total Score 13        Multiple values from one day are sorted in reverse-chronological order     Interpretation of PHQ-9 Total Score    Score Severity   1-4 No Depression   5-9 Mild Depression   10-14 Moderate Depression   15-19 Moderately Severe Depression   20-27 Severe Depression    Medical Records/Labs/Diagnostic Tests Reviewed:  [place] PDMP records - appropriate refills, no abuse suspected      Impression:  1.  Bipolar 2 mood disorder - worsening   2.  Generalized anxiety disorder - worsening   3.  ADHD, combined type - stable    Plan:  1.  I advised her to hold off on taking either Concerta or Adderall for ADHD until her mood and anxiety symptoms improved  2.  Continue Abilify 5 mg daily for mood stabilization given stable mood symptoms.  I had an extensive discussion with the patient and I do think that there is a hypomanic mood component and she should be on a mood stabilizing medication like Abilify.  She is currently taking 10 mg to make her  happy and have encouraged her to take 5 mg instant for now.  -Metabolic monitoring (2022): Lipid profile normal, A1c normal at 4.9  -Repeat metabolic monitoring labs due in 2023  3.  Continue Hydroxyzine 25 mg daily as needed for anxiety  4.  Continue Ativan 1 mg daily as needed for anxiety.  Not prescribed today.  She has in the ER at [facility] and was prescribed small amount of Ativan.  5.  Continue couples counseling with Geovany Rivera  6.  Continue individual psychotherapy with Rosemary Doty, FIDENCIOW  7.  Will consider starting an SSRI/SNRI medication for better management of anxiety at her next follow-up appointment  8.  Provided supportive psychotherapy and psychoeducation (>>45 minutes): Talking to both patient and her "  and how the marriage contributes to her mood and anxiety symptoms, discussed how she has a difficult time developing trusting relationships even with close family and encouraged to continue working on their marriage    Return to clinic in 2 weeks or sooner if symptoms worsen    The proposed treatment plan was discussed with the patient who was provided the opportunity to ask questions and make suggestions regarding alternative treatment. Patient verbalized understanding and expressed agreement with the plan.     Sumaya Wright M.D.  11/29/22    This note was created using voice recognition software (Dragon). The accuracy of the dictation is limited by the abilities of the software. I have reviewed the note prior to signing, however some errors in grammar and context are still possible. If you have any questions related to this note please do not hesitate to contact our office.

## 2022-12-30 ENCOUNTER — TELEPHONE (OUTPATIENT)
Dept: BEHAVIORAL HEALTH | Facility: CLINIC | Age: 27
End: 2022-12-30
Payer: OTHER GOVERNMENT

## 2022-12-30 NOTE — TELEPHONE ENCOUNTER
Patient notified and agreeable with plan. Pt wanted to let you know that she is having a really good day, and will go back home on her own time.

## 2023-02-17 ENCOUNTER — TELEMEDICINE (OUTPATIENT)
Dept: BEHAVIORAL HEALTH | Facility: CLINIC | Age: 28
End: 2023-02-17
Payer: OTHER GOVERNMENT

## 2023-02-17 DIAGNOSIS — F31.81 BIPOLAR 2 DISORDER (HCC): ICD-10-CM

## 2023-02-17 DIAGNOSIS — Z79.899 ENCOUNTER FOR LONG-TERM (CURRENT) USE OF MEDICATIONS: ICD-10-CM

## 2023-02-17 DIAGNOSIS — F41.1 GENERALIZED ANXIETY DISORDER: ICD-10-CM

## 2023-02-17 DIAGNOSIS — F90.2 ADHD (ATTENTION DEFICIT HYPERACTIVITY DISORDER), COMBINED TYPE: ICD-10-CM

## 2023-02-17 PROCEDURE — 90833 PSYTX W PT W E/M 30 MIN: CPT | Mod: 95 | Performed by: PSYCHIATRY & NEUROLOGY

## 2023-02-17 PROCEDURE — 99214 OFFICE O/P EST MOD 30 MIN: CPT | Mod: 95 | Performed by: PSYCHIATRY & NEUROLOGY

## 2023-02-17 RX ORDER — HYDROXYZINE HYDROCHLORIDE 25 MG/1
25 TABLET, FILM COATED ORAL
Qty: 30 TABLET | Refills: 0 | Status: SHIPPED | OUTPATIENT
Start: 2023-02-17

## 2023-02-17 RX ORDER — ARIPIPRAZOLE 10 MG/1
10 TABLET ORAL
Qty: 90 TABLET | Refills: 0 | Status: SHIPPED | OUTPATIENT
Start: 2023-02-17 | End: 2023-04-18 | Stop reason: SDUPTHER

## 2023-02-17 RX ORDER — CITALOPRAM HYDROBROMIDE 10 MG/1
TABLET ORAL
Qty: 53 TABLET | Refills: 0 | Status: SHIPPED | OUTPATIENT
Start: 2023-02-17 | End: 2023-04-18 | Stop reason: SDUPTHER

## 2023-02-17 NOTE — PROGRESS NOTES
PSYCHIATRY VIRTUAL VISIT FOLLOW-UP NOTE    Chief Complaint   Patient presents with    Follow-Up     Mood, anxiety, ADHD     This evaluation was conducted via Zoom using secure and encrypted videoconferencing technology.   The patient was in their home in the [facility].    The patient's identity was confirmed and verbal consent was obtained for this virtual visit.    History Of Present Illness:  Edward Gan is a 27 y.o. female with bipolar mood disorder, generalized anxiety disorder, PTSD, ADHD comes in today for follow up, was last seen about 2 months ago.  She has been doing better in regards to her mood but anxiety has been on and off struggle since her last appointment.  She continues to have significant stress in her marriage and is coming to the point where she thinks that her marriage needs to end.  She feels manipulated by her  and would like to make a decision for herself.  She is thinking about possible separation at the end of the semester when she graduates from [employer].  She is planning on giving her LSATs 1 more time this summer and then apply for law school in fall.  She wants to get a post nup agreement as she and her  own their business.  She plans on talking to her  about separation/divorce next month.  She did end up filing for divorce last year but her  was able to talk her out of it.  She would like to move to [place] and live independently.  She does not have the best of her relationship with her mother and is taking a break from that relationship as well.  She has been talking to her father who lives in [place] and that has been going well.  She has not been consistently seeing her therapist.  She has been taking Abilify 10 mg and is doing better in regards to mood stability.  She has been able to do more around the house and is taking care of personal hygiene.  She has not been taking medications for her ADHD and has been doing all right so far at  school.  She denies having thoughts of wanting to hurt herself.    Social History:   She is , lives with  and 17 yo step son (with intellectual disability) in [place], step daughter lives in [place], full time student at [employer], getting Bachelor's degree in History, will graduate in spring 2023, mother and 2 younger siblings live in [place], father lives in [place], she and  own financial planning business.     Substance Use:  Alcohol - Infrequent alcohol use  Nicotine - Denies   Cannabis - CBD gummies   Illicit drugs - Denies    Past Medication Trials:  Zoloft, Risperdal IM, Adderall XR 15 mg BID (effective, s/e - abdominal discomfort, decreased appetite)    Psychological testing with Dr. Emilia Park, Psy.D (11/3/2021): mild ADHD, combined type    Medications:  Current Outpatient Medications   Medication Sig Dispense Refill    LORazepam (ATIVAN) 1 MG Tab Take 1 Tablet by mouth 2 times a day as needed.      ARIPiprazole (ABILIFY) 5 MG tablet Take 1 Tablet by mouth every day. 90 Tablet 0    LORYNA 3-0.02 MG per tablet Take 1 Tablet by mouth every day.       No current facility-administered medications for this visit.     Review Of Systems:    Psychiatric - Positive for anxiety, depression     Physical Examination:  Vital signs: There were no vitals taken for this visit.    Musculoskeletal: No abnormal movements.     Mental Status Evaluation:   General: Young black female, tearful, dressed in casual attire, good grooming and hygiene, in no apparent distress, calm and cooperative, good eye contact, no psychomotor agitation or retardation  Orientation: Alert and oriented to person, place and time  Recent and remote memory: Grossly intact  Attention span and concentration: Grossly intact  Speech: Spontaneous, normal rate, rhythm and tone  Thought Process: Linear, logical and goal directed  Thought Content: Denies suicidal or homicidal ideations, intent or plan  Perception: No delusions  "noted  Associations: Intact  Language: Appropriate  Fund of knowledge and vocabulary: Grossly adequate  Mood: \"alright\"  Affect: Anxious and dysphoric, mood congruent  Insight: Good  Judgment: Good    Depression screenin/15/2020     8:30 AM 2022    12:00 PM   Depression Screen (PHQ-2/PHQ-9)   PHQ-2 Total Score 1 0   PHQ-9 Total Score 13      Interpretation of PHQ-9 Total Score    Score Severity   1-4 No Depression   5-9 Mild Depression   10-14 Moderate Depression   15-19 Moderately Severe Depression   20-27 Severe Depression    Medical Records/Labs/Diagnostic Tests Reviewed:  [place] PDMP records - appropriate refills, no abuse suspected      Impression:  1.  Bipolar 2 mood disorder - improving   2.  Generalized anxiety disorder - worsening   3.  ADHD, combined type - stable  4.  Long-term use of medications - Abilify    Plan:  1.  Start Celexa 5 mg in the morning for 2 weeks and then increase to 10 mg in the morning for anxiety and depression.  Discussed common side effects to look for including nausea, abdominal bloating, diarrhea, headaches etc.  2.  Continue Abilify 10 mg at bedtime for mood stabilization  -Metabolic monitoring (2022): Lipid profile normal, A1c normal at 4.9  -Repeat yearly metabolic monitoring labs ordered today: A1c and lipid profile  3.  Continue Hydroxyzine 25 mg daily as needed for anxiety  4.  Discontinue Ativan as she is not taking it anymore  5.  Continue couples counseling with Geovany Rivera, T Intern  6.  Continue individual psychotherapy with Rosemary Doty, F[facility]CI[facility].  I have advised her to start seeing her therapist regularly again  7.  Provided supportive psychotherapy (> 16 minutes): Decision about ending her marriage, how time and distance might help her get clarity regarding this decision, encouraged her to focus on her goals etc.    Return to clinic in 2 months or sooner if symptoms worsen    The proposed treatment plan was discussed with the patient who was provided " the opportunity to ask questions and make suggestions regarding alternative treatment. Patient verbalized understanding and expressed agreement with the plan.     Sumaya Wright M.D.  02/17/23    This note was created using voice recognition software (Dragon). The accuracy of the dictation is limited by the abilities of the software. I have reviewed the note prior to signing, however some errors in grammar and context are still possible. If you have any questions related to this note please do not hesitate to contact our office.

## 2023-04-17 ENCOUNTER — APPOINTMENT (OUTPATIENT)
Dept: LAB | Facility: MEDICAL CENTER | Age: 28
End: 2023-04-17
Payer: OTHER GOVERNMENT

## 2023-04-18 ENCOUNTER — OFFICE VISIT (OUTPATIENT)
Dept: MEDICAL GROUP | Facility: MEDICAL CENTER | Age: 28
End: 2023-04-18
Payer: OTHER GOVERNMENT

## 2023-04-18 ENCOUNTER — TELEMEDICINE (OUTPATIENT)
Dept: BEHAVIORAL HEALTH | Facility: CLINIC | Age: 28
End: 2023-04-18
Payer: OTHER GOVERNMENT

## 2023-04-18 ENCOUNTER — HOSPITAL ENCOUNTER (OUTPATIENT)
Dept: LAB | Facility: MEDICAL CENTER | Age: 28
End: 2023-04-18
Attending: FAMILY MEDICINE
Payer: OTHER GOVERNMENT

## 2023-04-18 VITALS
WEIGHT: 163.14 LBS | OXYGEN SATURATION: 95 % | TEMPERATURE: 97.8 F | SYSTOLIC BLOOD PRESSURE: 130 MMHG | DIASTOLIC BLOOD PRESSURE: 84 MMHG | RESPIRATION RATE: 16 BRPM | BODY MASS INDEX: 24.73 KG/M2 | HEART RATE: 69 BPM | HEIGHT: 68 IN

## 2023-04-18 DIAGNOSIS — F31.81 BIPOLAR 2 DISORDER (HCC): ICD-10-CM

## 2023-04-18 DIAGNOSIS — F41.1 GENERALIZED ANXIETY DISORDER: ICD-10-CM

## 2023-04-18 DIAGNOSIS — Z11.3 ROUTINE SCREENING FOR STI (SEXUALLY TRANSMITTED INFECTION): ICD-10-CM

## 2023-04-18 DIAGNOSIS — Z00.00 ANNUAL PHYSICAL EXAM: ICD-10-CM

## 2023-04-18 DIAGNOSIS — F90.2 ADHD (ATTENTION DEFICIT HYPERACTIVITY DISORDER), COMBINED TYPE: ICD-10-CM

## 2023-04-18 LAB
HBV SURFACE AG SER QL: NORMAL
HCV AB SER QL: NORMAL
HIV 1+2 AB+HIV1 P24 AG SERPL QL IA: NORMAL

## 2023-04-18 PROCEDURE — 99214 OFFICE O/P EST MOD 30 MIN: CPT | Mod: 95 | Performed by: PSYCHIATRY & NEUROLOGY

## 2023-04-18 PROCEDURE — 86780 TREPONEMA PALLIDUM: CPT

## 2023-04-18 PROCEDURE — 87340 HEPATITIS B SURFACE AG IA: CPT

## 2023-04-18 PROCEDURE — 87389 HIV-1 AG W/HIV-1&-2 AB AG IA: CPT

## 2023-04-18 PROCEDURE — 86803 HEPATITIS C AB TEST: CPT

## 2023-04-18 PROCEDURE — 86694 HERPES SIMPLEX NES ANTBDY: CPT

## 2023-04-18 PROCEDURE — 99395 PREV VISIT EST AGE 18-39: CPT | Performed by: FAMILY MEDICINE

## 2023-04-18 PROCEDURE — 36415 COLL VENOUS BLD VENIPUNCTURE: CPT

## 2023-04-18 PROCEDURE — 87491 CHLMYD TRACH DNA AMP PROBE: CPT

## 2023-04-18 PROCEDURE — 90833 PSYTX W PT W E/M 30 MIN: CPT | Mod: 95 | Performed by: PSYCHIATRY & NEUROLOGY

## 2023-04-18 PROCEDURE — 87591 N.GONORRHOEAE DNA AMP PROB: CPT

## 2023-04-18 RX ORDER — ARIPIPRAZOLE 10 MG/1
10 TABLET ORAL
Qty: 90 TABLET | Refills: 0 | Status: SHIPPED | OUTPATIENT
Start: 2023-04-18 | End: 2023-07-14 | Stop reason: SDUPTHER

## 2023-04-18 RX ORDER — CITALOPRAM HYDROBROMIDE 10 MG/1
10 TABLET ORAL EVERY MORNING
Qty: 90 TABLET | Refills: 0 | Status: SHIPPED | OUTPATIENT
Start: 2023-04-18 | End: 2023-07-14 | Stop reason: SDUPTHER

## 2023-04-18 SDOH — ECONOMIC STABILITY: HOUSING INSECURITY
IN THE LAST 12 MONTHS, WAS THERE A TIME WHEN YOU DID NOT HAVE A STEADY PLACE TO SLEEP OR SLEPT IN A SHELTER (INCLUDING NOW)?: NO

## 2023-04-18 SDOH — ECONOMIC STABILITY: INCOME INSECURITY: IN THE LAST 12 MONTHS, WAS THERE A TIME WHEN YOU WERE NOT ABLE TO PAY THE MORTGAGE OR RENT ON TIME?: NO

## 2023-04-18 SDOH — HEALTH STABILITY: PHYSICAL HEALTH: ON AVERAGE, HOW MANY MINUTES DO YOU ENGAGE IN EXERCISE AT THIS LEVEL?: 30 MIN

## 2023-04-18 SDOH — HEALTH STABILITY: PHYSICAL HEALTH: ON AVERAGE, HOW MANY DAYS PER WEEK DO YOU ENGAGE IN MODERATE TO STRENUOUS EXERCISE (LIKE A BRISK WALK)?: 2 DAYS

## 2023-04-18 SDOH — ECONOMIC STABILITY: HOUSING INSECURITY: IN THE LAST 12 MONTHS, HOW MANY PLACES HAVE YOU LIVED?: 1

## 2023-04-18 SDOH — ECONOMIC STABILITY: FOOD INSECURITY: WITHIN THE PAST 12 MONTHS, THE FOOD YOU BOUGHT JUST DIDN'T LAST AND YOU DIDN'T HAVE MONEY TO GET MORE.: NEVER TRUE

## 2023-04-18 SDOH — ECONOMIC STABILITY: FOOD INSECURITY: WITHIN THE PAST 12 MONTHS, YOU WORRIED THAT YOUR FOOD WOULD RUN OUT BEFORE YOU GOT MONEY TO BUY MORE.: NEVER TRUE

## 2023-04-18 SDOH — ECONOMIC STABILITY: TRANSPORTATION INSECURITY
IN THE PAST 12 MONTHS, HAS LACK OF RELIABLE TRANSPORTATION KEPT YOU FROM MEDICAL APPOINTMENTS, MEETINGS, WORK OR FROM GETTING THINGS NEEDED FOR DAILY LIVING?: NO

## 2023-04-18 SDOH — ECONOMIC STABILITY: TRANSPORTATION INSECURITY
IN THE PAST 12 MONTHS, HAS LACK OF TRANSPORTATION KEPT YOU FROM MEETINGS, WORK, OR FROM GETTING THINGS NEEDED FOR DAILY LIVING?: NO

## 2023-04-18 SDOH — ECONOMIC STABILITY: TRANSPORTATION INSECURITY
IN THE PAST 12 MONTHS, HAS THE LACK OF TRANSPORTATION KEPT YOU FROM MEDICAL APPOINTMENTS OR FROM GETTING MEDICATIONS?: NO

## 2023-04-18 SDOH — HEALTH STABILITY: MENTAL HEALTH
STRESS IS WHEN SOMEONE FEELS TENSE, NERVOUS, ANXIOUS, OR CAN'T SLEEP AT NIGHT BECAUSE THEIR MIND IS TROUBLED. HOW STRESSED ARE YOU?: TO SOME EXTENT

## 2023-04-18 SDOH — ECONOMIC STABILITY: INCOME INSECURITY: HOW HARD IS IT FOR YOU TO PAY FOR THE VERY BASICS LIKE FOOD, HOUSING, MEDICAL CARE, AND HEATING?: NOT HARD AT ALL

## 2023-04-18 ASSESSMENT — LIFESTYLE VARIABLES
DURING THE PAST 12 MONTHS, ON HOW MANY DAYS DID YOU DRINK MORE THAN A FEW SIPS OF BEER, WINE, OR ANY DRINK CONTAINING ALCOHOL: 0
HOW OFTEN DO YOU HAVE SIX OR MORE DRINKS ON ONE OCCASION: NEVER
AUDIT-C TOTAL SCORE: 1
DURING THE PAST 12 MONTHS, ON HOW MANY DAYS DID YOU USE ANY TOBACCO OR NICOTINE PRODUCTS: 0
DURING THE PAST 12 MONTHS, ON HOW MANY DAYS DID YOU USE ANYTHING ELSE TO GET HIGH: 0
DURING THE PAST 12 MONTHS, ON HOW MANY DAYS DID YOU DRINK MORE THAN A FEW SIPS OF BEER, WINE, OR ANY DRINK CONTAINING ALCOHOL: 0
HOW MANY STANDARD DRINKS CONTAINING ALCOHOL DO YOU HAVE ON A TYPICAL DAY: 1 OR 2
DURING THE PAST 12 MONTHS, ON HOW MANY DAYS DID YOU USE ANYTHING ELSE TO GET HIGH: 0
PART A TOTAL SCORE: 0
HOW OFTEN DO YOU HAVE A DRINK CONTAINING ALCOHOL: MONTHLY OR LESS
DURING THE PAST 12 MONTHS, ON HOW MANY DAYS DID YOU USE ANY TOBACCO OR NICOTINE PRODUCTS: 0
DURING THE PAST 12 MONTHS, ON HOW MANY DAYS DID YOU USE ANY MARIJUANA: 0
SKIP TO QUESTIONS 9-10: 1

## 2023-04-18 ASSESSMENT — ENCOUNTER SYMPTOMS
SEIZURES: 0
PALPITATIONS: 0
CHILLS: 0
FOCAL WEAKNESS: 0
ABDOMINAL PAIN: 0
EYE PAIN: 0
SPUTUM PRODUCTION: 0
WEIGHT LOSS: 0
SHORTNESS OF BREATH: 0
FEVER: 0
EYE REDNESS: 0
DIZZINESS: 0
SENSORY CHANGE: 0
HEMOPTYSIS: 0
TINGLING: 0
VOMITING: 0
EYE DISCHARGE: 0
NAUSEA: 0
WHEEZING: 0
BRUISES/BLEEDS EASILY: 0
MYALGIAS: 0
POLYDIPSIA: 0
BACK PAIN: 0
WEAKNESS: 0
COUGH: 0
NECK PAIN: 0
CONSTIPATION: 0
SINUS PAIN: 0
HEADACHES: 0
DIARRHEA: 0

## 2023-04-18 ASSESSMENT — SOCIAL DETERMINANTS OF HEALTH (SDOH)
DO YOU BELONG TO ANY CLUBS OR ORGANIZATIONS SUCH AS CHURCH GROUPS UNIONS, FRATERNAL OR ATHLETIC GROUPS, OR SCHOOL GROUPS?: YES
HOW OFTEN DO YOU ATTEND CHURCH OR RELIGIOUS SERVICES?: MORE THAN 4 TIMES PER YEAR
HOW OFTEN DO YOU HAVE SIX OR MORE DRINKS ON ONE OCCASION: NEVER
HOW OFTEN DO YOU ATTEND CHURCH OR RELIGIOUS SERVICES?: MORE THAN 4 TIMES PER YEAR
HOW MANY DRINKS CONTAINING ALCOHOL DO YOU HAVE ON A TYPICAL DAY WHEN YOU ARE DRINKING: 1 OR 2
DO YOU BELONG TO ANY CLUBS OR ORGANIZATIONS SUCH AS CHURCH GROUPS UNIONS, FRATERNAL OR ATHLETIC GROUPS, OR SCHOOL GROUPS?: YES
IN A TYPICAL WEEK, HOW MANY TIMES DO YOU TALK ON THE PHONE WITH FAMILY, FRIENDS, OR NEIGHBORS?: TWICE A WEEK
WITHIN THE PAST 12 MONTHS, YOU WORRIED THAT YOUR FOOD WOULD RUN OUT BEFORE YOU GOT THE MONEY TO BUY MORE: NEVER TRUE
HOW OFTEN DO YOU HAVE A DRINK CONTAINING ALCOHOL: MONTHLY OR LESS
HOW HARD IS IT FOR YOU TO PAY FOR THE VERY BASICS LIKE FOOD, HOUSING, MEDICAL CARE, AND HEATING?: NOT HARD AT ALL
IN A TYPICAL WEEK, HOW MANY TIMES DO YOU TALK ON THE PHONE WITH FAMILY, FRIENDS, OR NEIGHBORS?: TWICE A WEEK
HOW OFTEN DO YOU GET TOGETHER WITH FRIENDS OR RELATIVES?: ONCE A WEEK
HOW OFTEN DO YOU GET TOGETHER WITH FRIENDS OR RELATIVES?: ONCE A WEEK
HOW OFTEN DO YOU ATTENT MEETINGS OF THE CLUB OR ORGANIZATION YOU BELONG TO?: MORE THAN 4 TIMES PER YEAR
HOW OFTEN DO YOU ATTENT MEETINGS OF THE CLUB OR ORGANIZATION YOU BELONG TO?: MORE THAN 4 TIMES PER YEAR

## 2023-04-18 ASSESSMENT — FIBROSIS 4 INDEX: FIB4 SCORE: 0.18

## 2023-04-18 NOTE — LETTER
[facility]  Quintin Reagan M.D.  [address]  [place] [address]  Fax: [phone]   Authorization for Release/Disclosure of   Protected Health Information   Name: EDWARD GAN : 1995 SSN: [id number]   Address: [address] [place] [address] [address] [place] [address] Phone:    [phone] (home)    I authorize the entity listed below to release/disclose the PHI below to:   [facility]/Quintin Reagan M.D. and Quintin Reagan M.D.   Provider or Entity Name:     Address   City, State, Zip   Phone:      Fax:     Reason for request: continuity of care   Information to be released:    [  ] LAST COLONOSCOPY,  including any PATH REPORT and follow-up  [  ] LAST FIT/COLOGUARD RESULT [  ] LAST DEXA  [  ] LAST MAMMOGRAM  [  ] LAST PAP  [  ] LAST LABS [  ] RETINA EXAM REPORT  [  ] IMMUNIZATION RECORDS  [  ] Release all info      [  ] Check here and initial the line next to each item to release ALL health information INCLUDING  _____ Care and treatment for drug and / or alcohol abuse  _____ HIV testing, infection status, or AIDS  _____ Genetic Testing    DATES OF SERVICE OR TIME PERIOD TO BE DISCLOSED: _____________  I understand and acknowledge that:  * This Authorization may be revoked at any time by you in writing, except if your health information has already been used or disclosed.  * Your health information that will be used or disclosed as a result of you signing this authorization could be re-disclosed by the recipient. If this occurs, your re-disclosed health information may no longer be protected by State or Federal laws.  * You may refuse to sign this Authorization. Your refusal will not affect your ability to obtain treatment.  * This Authorization becomes effective upon signing and will  on (date) __________.      If no date is indicated, this Authorization will  one (1) year from the signature date.    Name: Edward Gan  Signature: Date:   2023     PLEASE FAX  REQUESTED RECORDS BACK TO: [phone]

## 2023-04-18 NOTE — PROGRESS NOTES
PSYCHIATRY VIRTUAL VISIT FOLLOW-UP NOTE    Chief Complaint   Patient presents with    Follow-Up     Mood, anxiety     This evaluation was conducted via Zoom using secure and encrypted videoconferencing technology.   The patient was in their home in the [facility].    The patient's identity was confirmed and verbal consent was obtained for this virtual visit.    History Of Present Illness:  Edward Gan is a 27 y.o. female with bipolar mood disorder, generalized anxiety disorder, PTSD, ADHD comes in today for follow up, was last seen 2 months ago.  She is doing better in regards to both mood and anxiety symptoms.  She has been compliant with Celexa and Abilify and has not endorsing any side effects.  She has been seeing a different therapist at [facility] and has been journaling regularly which has been helpful.  She mentions it right now things are all right with her and her .  She still wants to divorce but has not made a definite decision yet or talk to her  about it.  She is right now focusing on school so that she can graduate next month.  She is still planning on giving her LSATs this summer and then apply for law schools so that she can start law school next summer/fall.  She has not had any contact with her mother as well and she is recognizing how that relationship has been chaotic and affects her mental health.  She denies any recent reckless or impulsive behaviors.  She has been doing all right in regards to money spending.  She is active and has been taking care of personal hygiene.  She has noticed some struggles with focus and concentration without stimulant medications but has been able to work as well as she can.  She denies having thoughts of wanting to hurt herself.    Social History:   She is , lives with  and 17 yo step son (with intellectual disability) in [place], step daughter lives in [place], full time student at [employer], getting Bachelor's degree in History, will  graduate in spring 2023, mother and 2 younger siblings live in [place], father lives in [place], she and  own financial planning business.     Substance Use:  Alcohol - Infrequent alcohol use  Nicotine - Denies   Cannabis - Denies recent cannabis use  Illicit drugs - Denies    Past Medication Trials:  Zoloft, Risperdal IM, Adderall XR 15 mg BID (effective, s/e - abdominal discomfort, decreased appetite), Ativan (effective)    Psychological testing with Dr. Emilia Park, Psy.D (11/3/2021): mild ADHD, combined type    Medications:  Current Outpatient Medications   Medication Sig Dispense Refill    ARIPiprazole (ABILIFY) 10 MG Tab Take 1 Tablet by mouth at bedtime. 90 Tablet 0    citalopram (CELEXA) 10 MG tablet Take 0.5 Tablets by mouth every morning for 14 days, THEN 1 Tablet every morning for 46 days. 53 Tablet 0    hydrOXYzine HCl (ATARAX) 25 MG Tab Take 1 Tablet by mouth 1 time a day as needed for Anxiety. 30 Tablet 0    LORYNA 3-0.02 MG per tablet Take 1 Tablet by mouth every day.       No current facility-administered medications for this visit.     Review Of Systems:    Psychiatric - Positive for anxiety, depression     Physical Examination:  Vital signs: There were no vitals taken for this visit.    Musculoskeletal: No abnormal movements.     Mental Status Evaluation:   General: Young female, teary eyed, dressed in casual attire, good grooming and hygiene, in no apparent distress, calm and cooperative, good eye contact, no psychomotor agitation or retardation  Orientation: Alert and oriented to person, place and time  Recent and remote memory: Grossly intact  Attention span and concentration: Grossly intact  Speech: Spontaneous, normal rate, rhythm and tone  Thought Process: Linear, logical and goal directed  Thought Content: Denies suicidal or homicidal ideations, intent or plan  Perception: No delusions noted  Associations: Intact  Language: Appropriate  Fund of knowledge and vocabulary:  "Grossly adequate  Mood: \"doing well\"  Affect: Dysphoric, mood congruent  Insight: Good  Judgment: Good    Depression screenin/15/2020     8:30 AM 2022    12:00 PM   Depression Screen (PHQ-2/PHQ-9)   PHQ-2 Total Score 1 0   PHQ-9 Total Score 13      Interpretation of PHQ-9 Total Score    Score Severity   1-4 No Depression   5-9 Mild Depression   10-14 Moderate Depression   15-19 Moderately Severe Depression   20-27 Severe Depression    Medical Records/Labs/Diagnostic Tests Reviewed:  [place] PDMP records - appropriate refills      Impression:  1.  Bipolar 2 mood disorder - stable   2.  Generalized anxiety disorder - improving    3.  ADHD, combined type - stable    Plan:  1.  Continue Celexa 10 mg in the morning for anxiety and depression  2.  Continue Abilify 10 mg at bedtime for mood stabilization  -Metabolic monitoring (2022): Lipid profile normal, A1c normal at 4.9  -Repeat yearly metabolic monitoring labs ordered at last appointment.  [facility] and will send me a Cl[employer] message with her lab results.  3.  Continue Hydroxyzine 25 mg daily as needed for anxiety  4.  Continue to monitor ADHD without medications   5.  Continue couples counseling with Geovany Rivera, T Intern  6.  Continue individual psychotherapy with [facility] therapist.  She will not be able to continue therapy at [facility] once she graduates next month, I will send her community resources for therapy through [employer] message.  7.  Provided supportive psychotherapy (> 16 minutes): Chaotic/dysfunctional childhood and how that chaos/dysfunction has continued in her marriage and appears \"normal\", encouraged her to focus on school but then take time to work on this marriage and her decision of staying versus leaving    Return to clinic in 2 months or sooner if symptoms worsen    The proposed treatment plan was discussed with the patient who was provided the opportunity to ask questions and make suggestions regarding " alternative treatment. Patient verbalized understanding and expressed agreement with the plan.     Sumaya Wright M.D.  04/18/23    This note was created using voice recognition software (Dragon). The accuracy of the dictation is limited by the abilities of the software. I have reviewed the note prior to signing, however some errors in grammar and context are still possible. If you have any questions related to this note please do not hesitate to contact our office.

## 2023-04-18 NOTE — PROGRESS NOTES
27 y.o. female for annual physical exam    Chief Complaint.  Chief Complaint   Patient presents with    Annual Exam       History of present illness:    Ob-Gyn/ History:    OB History    Para Term  AB Living   1       1     SAB IAB Ectopic Molar Multiple Live Births   1                # Outcome Date GA Lbr Ishaan/2nd Weight Sex Delivery Anes PTL Lv   1 SAB                 Last Pap Smear: last year.  Had Pap smear done with gynecology, request records  History of abnormal pap smears: None  Gyn Surgery: None  Current Contraceptive Method:  OC[Ps  Sexual history: currently sexually active    Last menstrual period: last week  Periods regular: Regular    Health Maintenance  Advanced directive: n/a  Osteoporosis Screen/ DEXA: n/a  PT/vit D for falls prevention: n/a   Cholesterol Screening: Recent cholesterol panel came back normal  Diabetes Screening: Recent A1c came back normal  AAA Screening (65 to 74 year male): n/a   Aspirin Use: N/A    Below anticipatory guidance discussed with patient.  Diet: Eats healthy diet  Exercise: Is physically active  Substance Abuse: None  Safe in relationship. Yes  Seat belts, bike helmet, gun safety discussed.  Sun protection use discussed.    Cancer screening  Colorectal Cancer Screening: Discussed colon cancer screening start at age 45  Lung Cancer Screening: She does not smoke  Cervical Cancer Screening: Up-to-date for Pap smear, request records  Breast Cancer Screening: Discussed breast cancer screening started age 40 per other recommendations, at age 50 per USPSTF recommendations.  She had fibroadenoma removed from her right breast, would like to get that examined as she has extension of scar there.        Infectious disease screening/Immunizations  --STI Screening: Ordered STD testing  --Practices safe sex.  --HIV Screening: Ordered HIV testing  --Hepatitis C Screening: Ordered hep C testing  --Immunizations: Recommended to get COVID booster at pharmacy.     Review of  "systems:    Review of Systems   Constitutional:  Negative for chills, fever, malaise/fatigue and weight loss.   HENT:  Negative for ear discharge, ear pain, hearing loss and sinus pain.    Eyes:  Negative for pain, discharge and redness.   Respiratory:  Negative for cough, hemoptysis, sputum production, shortness of breath and wheezing.    Cardiovascular:  Negative for chest pain, palpitations and leg swelling.   Gastrointestinal:  Negative for abdominal pain, constipation, diarrhea, nausea and vomiting.   Genitourinary:  Negative for dysuria, frequency and urgency.   Musculoskeletal:  Negative for back pain, joint pain, myalgias and neck pain.   Skin:  Negative for itching and rash.   Neurological:  Negative for dizziness, tingling, sensory change, focal weakness, seizures, weakness and headaches.   Endo/Heme/Allergies:  Negative for polydipsia. Does not bruise/bleed easily.      Medications and Allergies:     Current Outpatient Medications   Medication Sig Dispense Refill    citalopram (CELEXA) 10 MG tablet Take 1 Tablet by mouth every morning. 90 Tablet 0    ARIPiprazole (ABILIFY) 10 MG Tab Take 1 Tablet by mouth at bedtime. 90 Tablet 0    hydrOXYzine HCl (ATARAX) 25 MG Tab Take 1 Tablet by mouth 1 time a day as needed for Anxiety. 30 Tablet 0    LORYNA 3-0.02 MG per tablet Take 1 Tablet by mouth every day.       No current facility-administered medications for this visit.        No Known Allergies    Vitals:    /84   Pulse 69   Temp 36.6 °C (97.8 °F)   Resp 16   Ht 1.727 m (5' 8\")   Wt 74 kg (163 lb 2.3 oz)   SpO2 95%  Body mass index is 24.81 kg/m².    Physical Exam:   Physical Exam  Constitutional:       Appearance: Normal appearance. She is well-developed and well-groomed. She is not diaphoretic.   HENT:      Head: Normocephalic and atraumatic.      Right Ear: Tympanic membrane, ear canal and external ear normal.      Left Ear: Tympanic membrane, ear canal and external ear normal.      Nose: Nose " normal.      Mouth/Throat:      Mouth: Mucous membranes are moist.      Pharynx: No oropharyngeal exudate or posterior oropharyngeal erythema.   Eyes:      General:         Right eye: No discharge.         Left eye: No discharge.      Conjunctiva/sclera: Conjunctivae normal.   Neck:      Thyroid: No thyromegaly.   Cardiovascular:      Rate and Rhythm: Normal rate and regular rhythm.      Heart sounds: Normal heart sounds. No murmur heard.  Pulmonary:      Effort: Pulmonary effort is normal. No respiratory distress.      Breath sounds: Normal breath sounds. No wheezing or rales.   Abdominal:      General: Bowel sounds are normal. There is no distension.      Palpations: Abdomen is soft.      Tenderness: There is no abdominal tenderness. There is no guarding.   Musculoskeletal:         General: No deformity. Normal range of motion.      Cervical back: Neck supple.   Skin:     General: Skin is warm.      Findings: No rash.   Neurological:      General: No focal deficit present.      Mental Status: She is alert. Mental status is at baseline.      Gait: Gait is intact.   Psychiatric:         Mood and Affect: Mood and affect normal.         Behavior: Behavior normal.        Medical assistant Evonne present in room during breast exam  Right breast Exam: No axillary lymphadenopathy, no skin changes, no dominant masses. No nipple retraction.  There is linear scar present above the nipple area of right breast, small keloid also at the end of the scar.    Assessment/Plan:    Edward was seen today for annual exam.    Diagnoses and all orders for this visit:    Annual physical exam  Up-to-date for cancer screenings.  Get COVID booster at pharmacy.  Anticipatory guidance eczema.    Recommend to monitor linear scar present above nipple area of right breast closely.  If small keloid is getting bigger in size, to follow-up in office we will do evaluation with ultrasound.    Routine screening for STI (sexually transmitted  infection)  -     ANTIBODY,TREPONEMA PALLIDUM; Future  -     HIV AG/AB COMBO ASSAY SCREENING; Future  -     HEP C VIRUS ANTIBODY; Future  -     Chlamydia/GC, PCR (Urine); Future  -     HSV I/II IGG & IGM SERUM; Future  -     HEP B SURFACE ANTIGEN; Future         Follow up in 1 year for annual physical, sooner as needed

## 2023-04-21 LAB
HSV1+2 IGG SER IA-ACNC: >22.4 IV
HSV1+2 IGM SER IA-ACNC: 1.26 IV
T PALLIDUM AB SER QL AGGL: NON REACTIVE

## 2023-04-21 RX ORDER — VALACYCLOVIR HYDROCHLORIDE 1 G/1
1000 TABLET, FILM COATED ORAL 2 TIMES DAILY
Qty: 20 TABLET | Refills: 0 | Status: SHIPPED | OUTPATIENT
Start: 2023-04-21 | End: 2023-05-01

## 2023-07-14 ENCOUNTER — TELEMEDICINE (OUTPATIENT)
Dept: BEHAVIORAL HEALTH | Facility: CLINIC | Age: 28
End: 2023-07-14
Payer: OTHER GOVERNMENT

## 2023-07-14 DIAGNOSIS — F31.81 BIPOLAR 2 DISORDER (HCC): ICD-10-CM

## 2023-07-14 DIAGNOSIS — F41.1 GENERALIZED ANXIETY DISORDER: ICD-10-CM

## 2023-07-14 DIAGNOSIS — F90.2 ADHD (ATTENTION DEFICIT HYPERACTIVITY DISORDER), COMBINED TYPE: ICD-10-CM

## 2023-07-14 PROCEDURE — 99214 OFFICE O/P EST MOD 30 MIN: CPT | Mod: 95 | Performed by: PSYCHIATRY & NEUROLOGY

## 2023-07-14 PROCEDURE — 90833 PSYTX W PT W E/M 30 MIN: CPT | Mod: 95 | Performed by: PSYCHIATRY & NEUROLOGY

## 2023-07-14 RX ORDER — ARIPIPRAZOLE 10 MG/1
10 TABLET ORAL
Qty: 90 TABLET | Refills: 0 | Status: SHIPPED | OUTPATIENT
Start: 2023-07-14 | End: 2023-10-13 | Stop reason: SDUPTHER

## 2023-07-14 RX ORDER — CITALOPRAM HYDROBROMIDE 10 MG/1
10 TABLET ORAL EVERY MORNING
Qty: 90 TABLET | Refills: 0 | Status: SHIPPED | OUTPATIENT
Start: 2023-07-14 | End: 2023-10-13 | Stop reason: SDUPTHER

## 2023-07-14 NOTE — PROGRESS NOTES
PSYCHIATRY VIRTUAL VISIT FOLLOW-UP NOTE    Chief Complaint   Patient presents with    Follow-Up     Mood, anxiety     This evaluation was conducted via Zoom using secure and encrypted videoconferencing technology.   The patient was in a private location outside of their home in the [facility].    The patient's identity was confirmed and verbal consent was obtained for this virtual visit.    History Of Present Illness:  Edward Gan is a 27 y.o. female with bipolar mood disorder, generalized anxiety disorder, PTSD, ADHD comes in today for follow up, was last seen 3 months ago.  She has been have decided to end their marriage has caused this.  She has been unhappy in her marriage for a while and has been going back and forth in regards to ending it.  However, she and her  have been talking and are on the same page of ending this marriage.  She plans on getting a job so that she can have health insurance and financially be in a better position.  She is also preparing for her LSATs that she plans on giving in September and then applying for law schools.  She would move to [place] if the divorce goes through first.  She has told her father who has been supportive.  She has not told her mother yet as they are not on speaking terms.  She reports good compliance with Abilify and Celexa.  She has noticed some struggles with her focus while she is studying for her LSATs but is trying to manage it well.  She denies having thoughts of wanting to hurt herself.    Social History:   She is  and  x 1, lives with  and 17 yo step son (with intellectual disability) in [place], step daughter lives in [place], Bachelor's degree in History, mother and 2 younger siblings live in [place], father lives in [place], she and  own financial planning business.     Substance Use:  Alcohol - Infrequent alcohol use  Nicotine - Denies   Cannabis - Smokes cannabis once a week  Illicit drugs -  "Denies    Past Medication Trials:  Zoloft, Risperdal IM, Adderall XR 15 mg BID (effective, s/e - abdominal discomfort, decreased appetite), Ativan (effective)    Psychological testing with Julia [email or web address] (11/3/2021): mild ADHD, combined type    Medications:  Current Outpatient Medications   Medication Sig Dispense Refill    citalopram (CELEXA) 10 MG tablet Take 1 Tablet by mouth every morning. 90 Tablet 0    ARIPiprazole (ABILIFY) 10 MG Tab Take 1 Tablet by mouth at bedtime. 90 Tablet 0    hydrOXYzine HCl (ATARAX) 25 MG Tab Take 1 Tablet by mouth 1 time a day as needed for Anxiety. 30 Tablet 0    LORYNA 3-0.02 MG per tablet Take 1 Tablet by mouth every day.       No current facility-administered medications for this visit.     Review Of Systems:    Psychiatric - Positive for anxiety, depression     Physical Examination:  Vital signs: There were no vitals taken for this visit.    Musculoskeletal: No abnormal movements.     Mental Status Evaluation:   General: Young female, tearful, dressed in casual attire, good grooming and hygiene, in no apparent distress, calm and cooperative, good eye contact, no psychomotor agitation or retardation  Orientation: Alert and oriented to person, place and time  Recent and remote memory: Grossly intact  Attention span and concentration: Grossly intact  Speech: Spontaneous, normal rate, rhythm and tone  Thought Process: Linear, logical and goal directed  Thought Content: Denies suicidal or homicidal ideations, intent or plan  Perception: No delusions noted  Associations: Intact  Language: Appropriate  Fund of knowledge and vocabulary: Grossly adequate  Mood: \"doing okay\"  Affect: Dysphoric, mood congruent  Insight: Good  Judgment: Good    Depression screenin/15/2020     8:30 AM 2022    12:00 PM   Depression Screen (PHQ-2/PHQ-9)   PHQ-2 Total Score 1 0   PHQ-9 Total Score 13      Interpretation of PHQ-9 Total Score    Score Severity   1-4 No Depression   5-9 " Mild Depression   10-14 Moderate Depression   15-19 Moderately Severe Depression   20-27 Severe Depression    Medical Records/Labs/Diagnostic Tests Reviewed:  [place] PDMP records - appropriate refills  Labs from 4/2023 sent via [facility] message - Lipid profile normal, A1c normal      Impression:  1.  Bipolar 2 mood disorder - stable   2.  Generalized anxiety disorder - stable    3.  ADHD, combined type - stable    Plan:  1.  Continue Celexa 10 mg in the morning for anxiety and depression  2.  Continue Abilify 10 mg at bedtime for mood stabilization  -Metabolic monitoring (4/2023): Lipid profile normal, A1c normal  -Repeat yearly metabolic monitoring labs due in 4/2024  3.  Continue Hydroxyzine 25 mg daily as needed for anxiety  4.  Continue to monitor ADHD without medications   6.  Provided supportive psychotherapy (> 16 minutes): Feelings related to ending of her marriage, discussed how she has been feeling for the last few years and validated her sadness as her marriage ends, encouraged her to focus on her goal of going to law school which she has been thinking for a while.    Return to clinic in 3 months or sooner if symptoms worsen    The proposed treatment plan was discussed with the patient who was provided the opportunity to ask questions and make suggestions regarding alternative treatment. Patient verbalized understanding and expressed agreement with the plan.     Sumaya Wright M.D.  07/14/23    This note was created using voice recognition software (Dragon). The accuracy of the dictation is limited by the abilities of the software. I have reviewed the note prior to signing, however some errors in grammar and context are still possible. If you have any questions related to this note please do not hesitate to contact our office.

## 2023-09-28 NOTE — TELEPHONE ENCOUNTER
Error.   What Type Of Note Output Would You Prefer (Optional)?: Standard Output What Is The Reason For Today's Visit?: Annual Full Body Skin Examination with No Concerns What Is The Reason For Today's Visit? (Being Monitored For X): concerning skin lesions on an annual basis

## 2023-10-13 ENCOUNTER — TELEMEDICINE (OUTPATIENT)
Dept: BEHAVIORAL HEALTH | Facility: CLINIC | Age: 28
End: 2023-10-13
Payer: OTHER GOVERNMENT

## 2023-10-13 DIAGNOSIS — F90.2 ADHD (ATTENTION DEFICIT HYPERACTIVITY DISORDER), COMBINED TYPE: ICD-10-CM

## 2023-10-13 DIAGNOSIS — F31.81 BIPOLAR 2 DISORDER (HCC): ICD-10-CM

## 2023-10-13 DIAGNOSIS — F41.1 GENERALIZED ANXIETY DISORDER: ICD-10-CM

## 2023-10-13 PROCEDURE — 99214 OFFICE O/P EST MOD 30 MIN: CPT | Mod: 95 | Performed by: PSYCHIATRY & NEUROLOGY

## 2023-10-13 PROCEDURE — 90833 PSYTX W PT W E/M 30 MIN: CPT | Mod: 95 | Performed by: PSYCHIATRY & NEUROLOGY

## 2023-10-13 RX ORDER — ARIPIPRAZOLE 10 MG/1
10 TABLET ORAL
Qty: 90 TABLET | Refills: 0 | Status: SHIPPED | OUTPATIENT
Start: 2023-10-13 | End: 2024-01-12 | Stop reason: SDUPTHER

## 2023-10-13 RX ORDER — CITALOPRAM HYDROBROMIDE 10 MG/1
10 TABLET ORAL EVERY MORNING
Qty: 90 TABLET | Refills: 0 | Status: SHIPPED | OUTPATIENT
Start: 2023-10-13 | End: 2024-01-12 | Stop reason: SDUPTHER

## 2023-10-13 RX ORDER — DEXTROAMPHETAMINE SACCHARATE, AMPHETAMINE ASPARTATE MONOHYDRATE, DEXTROAMPHETAMINE SULFATE AND AMPHETAMINE SULFATE 3.75; 3.75; 3.75; 3.75 MG/1; MG/1; MG/1; MG/1
15 CAPSULE, EXTENDED RELEASE ORAL EVERY MORNING
Qty: 30 CAPSULE | Refills: 0 | Status: SHIPPED | OUTPATIENT
Start: 2023-11-11 | End: 2023-12-11

## 2023-10-13 RX ORDER — DEXTROAMPHETAMINE SACCHARATE, AMPHETAMINE ASPARTATE MONOHYDRATE, DEXTROAMPHETAMINE SULFATE AND AMPHETAMINE SULFATE 3.75; 3.75; 3.75; 3.75 MG/1; MG/1; MG/1; MG/1
15 CAPSULE, EXTENDED RELEASE ORAL EVERY MORNING
Qty: 30 CAPSULE | Refills: 0 | Status: SHIPPED | OUTPATIENT
Start: 2023-10-13 | End: 2023-11-12

## 2023-10-13 NOTE — PROGRESS NOTES
PSYCHIATRY VIRTUAL VISIT FOLLOW-UP NOTE    Chief Complaint   Patient presents with    Follow-Up     Mood, anxiety     This evaluation was conducted via Zoom using secure and encrypted videoconferencing technology.   The patient was in a private location outside of their home in the [facility].    The patient's identity was confirmed and verbal consent was obtained for this virtual visit.    History Of Present Illness:  Edward Gan is a 28 y.o. female with bipolar mood disorder, generalized anxiety disorder, PTSD, ADHD comes in today for follow up, was last seen 3 months ago.  She has been doing all right in regards to her mental health.  She has been compliant with both Celexa and Abilify and is endorsing benefit.  She has been noticing some increase struggles with focus, motivation, easy distractibility and low energy.  She is taking Adderall from previous prescriptions and it does help her focus better.  She has noticed that her attention to detail and task completion is better when she is taking Adderall.  She gave her LSATs and will be applying for law schools in [place] and [place] the next few months.  She and her  have been doing good in regards to the relationship.  They have both agreed to this separation and are cordial with each other.  She is planning to find a condo in town where she can move before she starts law school.  She mentions that they are planning on going through her divorce without involving the .  She denies having thoughts of wanting to hurt herself.    Social History:   She is  and  x 1, lives with  and 17 yo step son (with intellectual disability) in [place], step daughter lives in [place], Bachelor's degree in History, applying for law schools, mother and 2 younger siblings live in [place], father lives in [place], she and  own financial planning business.     Substance Use:  Alcohol - Infrequent alcohol use  Nicotine - Denies  "  Cannabis - Smokes cannabis once a week  Illicit drugs - Denies    Past Medication Trials:  Zoloft, Risperdal IM, Adderall XR 15 mg BID (effective, s/e - abdominal discomfort, decreased appetite), Ativan (effective)    Psychological testing with Dr. Emilia Park, Psy.D (11/3/2021): mild ADHD, combined type    Medications:  Current Outpatient Medications   Medication Sig Dispense Refill    ARIPiprazole (ABILIFY) 10 MG Tab Take 1 Tablet by mouth at bedtime. 90 Tablet 0    citalopram (CELEXA) 10 MG tablet Take 1 Tablet by mouth every morning. 90 Tablet 0    hydrOXYzine HCl (ATARAX) 25 MG Tab Take 1 Tablet by mouth 1 time a day as needed for Anxiety. 30 Tablet 0    LORYNA 3-0.02 MG per tablet Take 1 Tablet by mouth every day.       No current facility-administered medications for this visit.     Review Of Systems:    Psychiatric - Positive for anxiety, depression     Physical Examination:  Vital signs: There were no vitals taken for this visit.    Musculoskeletal: No abnormal movements.     Mental Status Evaluation:   General: Young female, tearful, dressed in casual attire, good grooming and hygiene, in no apparent distress, calm and cooperative, good eye contact, no psychomotor agitation or retardation  Orientation: Alert and oriented to person, place and time  Recent and remote memory: Grossly intact  Attention span and concentration: Grossly intact  Speech: Spontaneous, normal rate, rhythm and tone  Thought Process: Linear, logical and goal directed  Thought Content: Denies suicidal or homicidal ideations, intent or plan  Perception: No delusions noted  Associations: Intact  Language: Appropriate  Fund of knowledge and vocabulary: Grossly adequate  Mood: \"okay\"  Affect: Dysphoric, mood congruent  Insight: Good  Judgment: Good    Depression screenin/15/2020     8:30 AM 2022    12:00 PM   Depression Screen (PHQ-2/PHQ-9)   PHQ-2 Total Score 1 0   PHQ-9 Total Score 13      Interpretation of PHQ-9 " Total Score    Score Severity   1-4 No Depression   5-9 Mild Depression   10-14 Moderate Depression   15-19 Moderately Severe Depression   20-27 Severe Depression    Medical Records/Labs/Diagnostic Tests Reviewed:  [place] PDMP records - appropriate refills    Impression:  1.  Bipolar 2 mood disorder - stable   2.  Generalized anxiety disorder - stable    3.  ADHD, combined type - worsening     Plan:  1.  Continue Celexa 10 mg in the morning for anxiety and depression  2.  Continue Abilify 10 mg at bedtime for mood stabilization  -Metabolic monitoring (4/2023): Lipid profile normal, A1c normal  -Repeat yearly metabolic monitoring labs due in 4/2024  3.  Continue Hydroxyzine 25 mg daily as needed for anxiety  4.  Restart Adderall XR 15 mg in the morning.  E-prescribed x 2 months.  I have advised her to watch for hypomania and insomnia with restarting Adderall.  5.  Provided supportive psychotherapy (> 16 minutes): Feelings related to ending of her marriage, encouraged focusing on her goal of going to law school and working on little things to gain independence and confidence, advised continued self-care.    Return to clinic in 3 months or sooner if symptoms worsen    The proposed treatment plan was discussed with the patient who was provided the opportunity to ask questions and make suggestions regarding alternative treatment. Patient verbalized understanding and expressed agreement with the plan.     Sumaya Wright M.D.  10/13/23    This note was created using voice recognition software (Dragon). The accuracy of the dictation is limited by the abilities of the software. I have reviewed the note prior to signing, however some errors in grammar and context are still possible. If you have any questions related to this note please do not hesitate to contact our office.    yellow

## 2024-01-02 ENCOUNTER — OFFICE VISIT (OUTPATIENT)
Dept: URGENT CARE | Facility: CLINIC | Age: 29
End: 2024-01-02
Payer: OTHER GOVERNMENT

## 2024-01-02 VITALS
OXYGEN SATURATION: 99 % | RESPIRATION RATE: 20 BRPM | DIASTOLIC BLOOD PRESSURE: 80 MMHG | HEART RATE: 65 BPM | SYSTOLIC BLOOD PRESSURE: 112 MMHG | TEMPERATURE: 97.2 F | BODY MASS INDEX: 29.77 KG/M2 | HEIGHT: 68 IN | WEIGHT: 196.4 LBS

## 2024-01-02 DIAGNOSIS — H00.014 HORDEOLUM EXTERNUM OF LEFT UPPER EYELID: ICD-10-CM

## 2024-01-02 PROCEDURE — 99213 OFFICE O/P EST LOW 20 MIN: CPT | Performed by: FAMILY MEDICINE

## 2024-01-02 PROCEDURE — 3074F SYST BP LT 130 MM HG: CPT | Performed by: FAMILY MEDICINE

## 2024-01-02 PROCEDURE — 3079F DIAST BP 80-89 MM HG: CPT | Performed by: FAMILY MEDICINE

## 2024-01-02 RX ORDER — DEXTROAMPHETAMINE SACCHARATE, AMPHETAMINE ASPARTATE MONOHYDRATE, DEXTROAMPHETAMINE SULFATE AND AMPHETAMINE SULFATE 2.5; 2.5; 2.5; 2.5 MG/1; MG/1; MG/1; MG/1
10 CAPSULE, EXTENDED RELEASE ORAL EVERY MORNING
COMMUNITY
End: 2024-01-12

## 2024-01-02 RX ORDER — SULFAMETHOXAZOLE AND TRIMETHOPRIM 800; 160 MG/1; MG/1
1 TABLET ORAL 2 TIMES DAILY
Qty: 10 TABLET | Refills: 0 | Status: SHIPPED | OUTPATIENT
Start: 2024-01-02 | End: 2024-01-07

## 2024-01-02 ASSESSMENT — FIBROSIS 4 INDEX: FIB4 SCORE: 0.19

## 2024-01-02 NOTE — PROGRESS NOTES
"  Subjective:      28 y.o. female presents to urgent care for concerns of stye to her left eye that have been present for approximately one week.  She has been using warm compresses without any improvement in symptoms.  No change in vision. She does not wear corrective eyeglasses or contact lenses.    She denies any other questions or concerns at this time.    Current problem list, medication, and past medical/surgical history were reviewed in Epic.    ROS  See HPI     Objective:      /80   Pulse 65   Temp 36.2 °C (97.2 °F) (Temporal)   Resp 20   Ht 1.727 m (5' 8\")   Wt 89.1 kg (196 lb 6.4 oz)   SpO2 99%   BMI 29.86 kg/m²     Physical Exam  Constitutional:       General: She is not in acute distress.     Appearance: She is not diaphoretic.   HENT:      Right Ear: Tympanic membrane, ear canal and external ear normal.      Left Ear: Tympanic membrane, ear canal and external ear normal.   Eyes:      General:         Right eye: No discharge.         Left eye: No discharge.      Extraocular Movements: Extraocular movements intact.      Conjunctiva/sclera: Conjunctivae normal.      Pupils: Pupils are equal, round, and reactive to light.      Comments: Left upper eyelid is edematous   Cardiovascular:      Rate and Rhythm: Normal rate and regular rhythm.      Heart sounds: Normal heart sounds.   Pulmonary:      Effort: Pulmonary effort is normal. No respiratory distress.      Breath sounds: Normal breath sounds.   Neurological:      Mental Status: She is alert.   Psychiatric:         Mood and Affect: Affect normal.         Judgment: Judgment normal.       Assessment/Plan:     1. Hordeolum externum of left upper eyelid  No improvement with conservative management, prescription for Bactrim has been sent.  She was encouraged to continue with warm compresses.  - sulfamethoxazole-trimethoprim (BACTRIM DS) 800-160 MG tablet; Take 1 Tablet by mouth 2 times a day for 5 days.  Dispense: 10 Tablet; Refill: " 0      Instructed to return to Urgent Care or nearest Emergency Department if symptoms fail to improve, for any change in condition, further concerns, or new concerning symptoms. Patient states understanding of the plan of care and discharge instructions.    Tiffanie José M.D.

## 2024-01-12 ENCOUNTER — TELEMEDICINE (OUTPATIENT)
Dept: BEHAVIORAL HEALTH | Facility: CLINIC | Age: 29
End: 2024-01-12
Payer: OTHER GOVERNMENT

## 2024-01-12 DIAGNOSIS — F41.1 GENERALIZED ANXIETY DISORDER: ICD-10-CM

## 2024-01-12 DIAGNOSIS — F90.2 ADHD (ATTENTION DEFICIT HYPERACTIVITY DISORDER), COMBINED TYPE: ICD-10-CM

## 2024-01-12 DIAGNOSIS — F31.81 BIPOLAR 2 DISORDER (HCC): ICD-10-CM

## 2024-01-12 PROCEDURE — 99214 OFFICE O/P EST MOD 30 MIN: CPT | Mod: 95 | Performed by: PSYCHIATRY & NEUROLOGY

## 2024-01-12 PROCEDURE — 90833 PSYTX W PT W E/M 30 MIN: CPT | Mod: 95 | Performed by: PSYCHIATRY & NEUROLOGY

## 2024-01-12 RX ORDER — DEXTROAMPHETAMINE SACCHARATE, AMPHETAMINE ASPARTATE MONOHYDRATE, DEXTROAMPHETAMINE SULFATE AND AMPHETAMINE SULFATE 3.75; 3.75; 3.75; 3.75 MG/1; MG/1; MG/1; MG/1
15 CAPSULE, EXTENDED RELEASE ORAL EVERY MORNING
Qty: 30 CAPSULE | Refills: 0 | Status: SHIPPED | OUTPATIENT
Start: 2024-02-10 | End: 2024-03-11

## 2024-01-12 RX ORDER — CITALOPRAM HYDROBROMIDE 10 MG/1
10 TABLET ORAL EVERY MORNING
Qty: 90 TABLET | Refills: 0 | Status: SHIPPED | OUTPATIENT
Start: 2024-01-12

## 2024-01-12 RX ORDER — ARIPIPRAZOLE 10 MG/1
10 TABLET ORAL
Qty: 90 TABLET | Refills: 0 | Status: SHIPPED | OUTPATIENT
Start: 2024-01-12

## 2024-01-12 RX ORDER — DEXTROAMPHETAMINE SACCHARATE, AMPHETAMINE ASPARTATE MONOHYDRATE, DEXTROAMPHETAMINE SULFATE AND AMPHETAMINE SULFATE 3.75; 3.75; 3.75; 3.75 MG/1; MG/1; MG/1; MG/1
15 CAPSULE, EXTENDED RELEASE ORAL EVERY MORNING
Qty: 30 CAPSULE | Refills: 0 | Status: SHIPPED | OUTPATIENT
Start: 2024-01-12 | End: 2024-02-11

## 2024-01-12 NOTE — PROGRESS NOTES
PSYCHIATRY VIRTUAL VISIT FOLLOW-UP NOTE    Chief Complaint   Patient presents with    Follow-Up     Mood, anxiety, ADHD     This evaluation was conducted via Zoom using secure and encrypted videoconferencing technology.   The patient was in a private location outside of their home in the [facility].    The patient's identity was confirmed and verbal consent was obtained for this virtual visit.    History Of Present Illness:  Edward Gan is a 28 y.o. female with bipolar mood disorder, generalized anxiety disorder, PTSD, ADHD comes in today for follow up, was last seen 3 months ago.  She has been doing good in regards to her mental health.  She restarted Adderall and has been taking it on the days where she has a lot going on and needs to focus on tasks and it has been beneficial.  She has not noticed any side effects from Adderall so far.  She has been compliant with Celexa and Abilify and denies any overwhelming struggles with depression or hypomania.  She denies any impulsive or reckless decision making.  She has been really focused on the business which has been doing really well.  She and her  continue to be  but are still living together.  She is having second thoughts about law school as she is finding more interest in this business.  She is also unable to decide what she wants from this marriage, continues to love her  and they have been doing really well as business partners but is not sure what lies ahead for her.  She has had minimal contact with her mother.  She denies having thoughts of wanting to hurt herself.    Social History:   She is  and  x 1, lives with  and adult step son (with intellectual disability) in [place], step daughter lives in [place], Bachelor's degree in History, applying for law schools, mother and 2 younger siblings live in [place], father lives in [place], she and  own financial planning business.     Substance  "Use:  Alcohol - Infrequent use  Nicotine - Denies   Cannabis - Smokes once a week  Illicit drugs - Denies    Past Medication Trials:  Zoloft, Risperdal IM, Ativan (effective)    Psychological testing with Dr. Emilia Park, Julia.D (11/3/2021): mild ADHD, combined type    Medications:  Current Outpatient Medications   Medication Sig Dispense Refill    amphetamine-dextroamphetamine (ADDERALL XR) 15 MG XR capsule Take 1 Capsule by mouth every morning for 30 days. 30 Capsule 0    [START ON 2/10/2024] amphetamine-dextroamphetamine (ADDERALL XR) 15 MG XR capsule Take 1 Capsule by mouth every morning for 30 days. 30 Capsule 0    ARIPiprazole (ABILIFY) 10 MG Tab Take 1 Tablet by mouth at bedtime. 90 Tablet 0    citalopram (CELEXA) 10 MG tablet Take 1 Tablet by mouth every morning. 90 Tablet 0    hydrOXYzine HCl (ATARAX) 25 MG Tab Take 1 Tablet by mouth 1 time a day as needed for Anxiety. 30 Tablet 0    LORYNA 3-0.02 MG per tablet Take 1 Tablet by mouth every day.       No current facility-administered medications for this visit.     Review Of Systems:    Psychiatric - Positive for anxiety, depression     Physical Examination:  Vital signs: There were no vitals taken for this visit.    Musculoskeletal: No abnormal movements.     Mental Status Evaluation:   General: Young female, teary eyed, dressed in casual attire, good grooming and hygiene, in no apparent distress, calm and cooperative, good eye contact, no psychomotor agitation or retardation  Orientation: Alert and oriented to person, place and time  Recent and remote memory: Grossly intact  Attention span and concentration: Grossly intact  Speech: Spontaneous, normal rate, rhythm and tone  Thought Process: Linear, logical and goal directed  Thought Content: Denies suicidal or homicidal ideations, intent or plan  Perception: No delusions noted  Associations: Intact  Language: Appropriate  Fund of knowledge and vocabulary: Grossly adequate  Mood: \"alright\"  Affect: " Dysphoric at times, mood congruent  Insight: Good  Judgment: Good    Depression screenin/15/2020     8:30 AM 2022    12:00 PM   Depression Screen (PHQ-2/PHQ-9)   PHQ-2 Total Score 1 0   PHQ-9 Total Score 13      Interpretation of PHQ-9 Total Score    Score Severity   1-4 No Depression   5-9 Mild Depression   10-14 Moderate Depression   15-19 Moderately Severe Depression   20-27 Severe Depression    Medical Records/Labs/Diagnostic Tests Reviewed:  [place] PDMP records - appropriate refills    Impression:  1.  Bipolar 2 mood disorder - stable   2.  Generalized anxiety disorder - stable    3.  ADHD, combined type - improving     Plan:  1.  Continue Celexa 10 mg in the morning for anxiety and depression  2.  Continue Abilify 10 mg at bedtime for mood stabilization  -Metabolic monitoring (2023): Lipid profile normal, A1c normal  -Repeat yearly metabolic monitoring labs due in 2024  3.  Continue Hydroxyzine 25 mg daily as needed for anxiety  4.  Continue Adderall XR 15 mg in the morning for ADHD.  E-prescribed x 2 months.    5.  Referral placed for individual psychotherapy  6.  Provided supportive psychotherapy (> 16 minutes): Talked about having second thoughts about law school and the future of this marriage, encouraged continued self-care and to try and make the best decision for herself    Return to clinic in 3 months or sooner if symptoms worsen    The proposed treatment plan was discussed with the patient who was provided the opportunity to ask questions and make suggestions regarding alternative treatment. Patient verbalized understanding and expressed agreement with the plan.     Sumaya Wright M.D.  24    This note was created using voice recognition software (Dragon). The accuracy of the dictation is limited by the abilities of the software. I have reviewed the note prior to signing, however some errors in grammar and context are still possible. If you have any questions related to this note  please do not hesitate to contact our office.

## 2024-01-23 ENCOUNTER — OFFICE VISIT (OUTPATIENT)
Dept: BEHAVIORAL HEALTH | Facility: CLINIC | Age: 29
End: 2024-01-23
Payer: OTHER GOVERNMENT

## 2024-01-23 DIAGNOSIS — F31.81 BIPOLAR 2 DISORDER (HCC): ICD-10-CM

## 2024-01-23 PROCEDURE — 90791 PSYCH DIAGNOSTIC EVALUATION: CPT | Performed by: COUNSELOR

## 2024-01-23 ASSESSMENT — PATIENT HEALTH QUESTIONNAIRE - PHQ9
CLINICAL INTERPRETATION OF PHQ2 SCORE: 0
5. POOR APPETITE OR OVEREATING: 0 - NOT AT ALL

## 2024-01-23 ASSESSMENT — ANXIETY QUESTIONNAIRES
2. NOT BEING ABLE TO STOP OR CONTROL WORRYING: NOT AT ALL
5. BEING SO RESTLESS THAT IT IS HARD TO SIT STILL: NOT AT ALL
7. FEELING AFRAID AS IF SOMETHING AWFUL MIGHT HAPPEN: SEVERAL DAYS
6. BECOMING EASILY ANNOYED OR IRRITABLE: NOT AT ALL
1. FEELING NERVOUS, ANXIOUS, OR ON EDGE: SEVERAL DAYS
4. TROUBLE RELAXING: NOT AT ALL
GAD7 TOTAL SCORE: 2
3. WORRYING TOO MUCH ABOUT DIFFERENT THINGS: NOT AT ALL
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
## 2024-02-06 ENCOUNTER — OFFICE VISIT (OUTPATIENT)
Dept: BEHAVIORAL HEALTH | Facility: CLINIC | Age: 29
End: 2024-02-06
Payer: OTHER GOVERNMENT

## 2024-02-06 DIAGNOSIS — F31.81 BIPOLAR 2 DISORDER (HCC): ICD-10-CM

## 2024-02-06 PROCEDURE — 90834 PSYTX W PT 45 MINUTES: CPT | Performed by: COUNSELOR

## 2024-02-06 NOTE — PROGRESS NOTES
[facility]   Therapy Progress Note        Name: Edward Gan  MRN: [id number]  : 1995  Age: 28 y.o.  Date of assessment: 2024  PCP: Quintin Reagan M.D.  Persons in attendance: Patient  Total session time: 50 minutes      Topics addressed in psychotherapy include: Patient reports feeling much better than initial session; patient shared that she has been having success in her marriage and is looking to have a bigger conversation with her  in the next 2 weeks, patient reports that she has decided to at least postpone law school, while considering that it may not be the field for her after all. Behavior: Patient opened up about her little brother and the depression she went into while in college; patient reports that her little brother had to have one of his legs amputated and patient felt extremely sad about the amputation along with feelings of guilt. Intervention: Therapist supported patient in processing feelings related to her brother and his illness and complications that stem from his illness. Therapist provided psychoeducation on depression and stress. Therapist actively listened to patient describe her relationship with her siblings, stressors, and pressure she has felt being the oldest of 3, reframed, and reflected back emotions in order to increase insight into emotions. Response: Patient acknowledged that she had not done much processing around her brother and his illness/complications. Patient acknowledged feeling a lot of pressure being the oldest of 3 and experiencing some survivors guilt since she is healthy and her brother is not. Patient was able to identify that her brother was actually happy to have his leg amputated (due to periods of prolonged pain associated with his leg), and that she was likely more upset about the amputation than he was. Plan: Patient has agreed to schedule next session with therapist. Patient has stated that she plans to have a  conversation with her  about their relationship.     Objective Observations:   Participation:Active verbal participation, Attentive, and Engaged   Grooming:Casual and Neat   Cognition:Alert and Fully Oriented   Eye Contact:Good   Mood: WNL     Affect:Flexible, Full range, Congruent with content, and Tearful   Thought Process:Logical   Speech:Rate within normal limits and Volume within normal limits    Current Risk:   Suicide: low   Homicide: low   Self-Harm: low   Relapse: low   Safety Plan Reviewed: not applicable    Care Plan Updated: No    Does patient express agreement with the above plan? Yes     Diagnosis:  Bipolar 2 Disorder    Referral appointment(s) scheduled?  Patient has scheduled future sessions with therapist.         CHETNA Chaudhry.

## 2024-02-27 ENCOUNTER — OFFICE VISIT (OUTPATIENT)
Dept: BEHAVIORAL HEALTH | Facility: CLINIC | Age: 29
End: 2024-02-27
Payer: OTHER GOVERNMENT

## 2024-02-27 DIAGNOSIS — F31.81 BIPOLAR 2 DISORDER (HCC): ICD-10-CM

## 2024-02-27 PROCEDURE — 99999 PR NO CHARGE: CPT | Performed by: COUNSELOR
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
## 2024-03-01 ENCOUNTER — OFFICE VISIT (OUTPATIENT)
Dept: URGENT CARE | Facility: CLINIC | Age: 29
End: 2024-03-01
Payer: OTHER GOVERNMENT

## 2024-03-01 VITALS
BODY MASS INDEX: 30.1 KG/M2 | HEART RATE: 63 BPM | OXYGEN SATURATION: 100 % | HEIGHT: 68 IN | TEMPERATURE: 97.6 F | RESPIRATION RATE: 16 BRPM | WEIGHT: 198.6 LBS | SYSTOLIC BLOOD PRESSURE: 110 MMHG | DIASTOLIC BLOOD PRESSURE: 60 MMHG

## 2024-03-01 DIAGNOSIS — B96.89 BACTERIAL SINUSITIS: ICD-10-CM

## 2024-03-01 DIAGNOSIS — J02.9 PHARYNGITIS, UNSPECIFIED ETIOLOGY: ICD-10-CM

## 2024-03-01 DIAGNOSIS — J32.9 BACTERIAL SINUSITIS: ICD-10-CM

## 2024-03-01 LAB — S PYO DNA SPEC NAA+PROBE: NOT DETECTED

## 2024-03-01 PROCEDURE — 3074F SYST BP LT 130 MM HG: CPT

## 2024-03-01 PROCEDURE — 3078F DIAST BP <80 MM HG: CPT

## 2024-03-01 PROCEDURE — 87651 STREP A DNA AMP PROBE: CPT

## 2024-03-01 PROCEDURE — 99213 OFFICE O/P EST LOW 20 MIN: CPT

## 2024-03-01 RX ORDER — AMOXICILLIN AND CLAVULANATE POTASSIUM 875; 125 MG/1; MG/1
1 TABLET, FILM COATED ORAL 2 TIMES DAILY
Qty: 14 TABLET | Refills: 0 | Status: SHIPPED | OUTPATIENT
Start: 2024-03-01 | End: 2024-03-08

## 2024-03-01 ASSESSMENT — FIBROSIS 4 INDEX: FIB4 SCORE: 0.19

## 2024-03-01 NOTE — PROGRESS NOTES
Subjective:   Edward Gan is a 28 y.o. female who presents for Pharyngitis (X1week runny nose/nasal congestion/fatigue/)      HPI:    Patient presents to urgent care with concerns of one week of rhinorrhea, nasal congestion, headache, sore throat, dry cough.    Mild improvement with Advil, warm showers, nasal rinses, antihistamines.   Reports worsening of symptoms in the past 1-2 days.   Endorses very light dizziness, upper teeth throbbing, chills  Denies wheezing, chest tightness, SOB  No fever.  Tolerating diet.  Denies known sick contacts.   Would like to be tested for strep, denies known strep contacts.    ROS As above in HPI    Medications:    Current Outpatient Medications on File Prior to Visit   Medication Sig Dispense Refill    amphetamine-dextroamphetamine (ADDERALL XR) 15 MG XR capsule Take 1 Capsule by mouth every morning for 30 days. 30 Capsule 0    ARIPiprazole (ABILIFY) 10 MG Tab Take 1 Tablet by mouth at bedtime. 90 Tablet 0    citalopram (CELEXA) 10 MG tablet Take 1 Tablet by mouth every morning. 90 Tablet 0    hydrOXYzine HCl (ATARAX) 25 MG Tab Take 1 Tablet by mouth 1 time a day as needed for Anxiety. 30 Tablet 0    LORYNA 3-0.02 MG per tablet Take 1 Tablet by mouth every day.       No current facility-administered medications on file prior to visit.        Allergies:   Patient has no known allergies.    Problem List:   Patient Active Problem List   Diagnosis    PTSD (post-traumatic stress disorder)    Generalized anxiety disorder    Bipolar 2 disorder (HCC)    Oral contraceptive use    Elevated DHEA    Hirsutism    ADHD (attention deficit hyperactivity disorder), combined type    Breast mass, right        Surgical History:  Past Surgical History:   Procedure Laterality Date    BREAST BIOPSY Right 5/24/2022    Procedure: EXCISIONAL BIOPSY, BREAST;  Surgeon: Carson Tineo M.D.;  Location: SURGERY SAME DAY [facility];  Service: General    OTHER  2021    ablation - neck       Past Social  "Hx:   Social History     Tobacco Use    Smoking status: Never    Smokeless tobacco: Never   Vaping Use    Vaping Use: Former    Substances: CBD   Substance Use Topics    Alcohol use: Not Currently     Comment: once a month    Drug use: Yes     Types: Marijuana, Inhaled     Comment: once a week          Problem list, medications, and allergies reviewed by myself today in Epic.     Objective:     /60 (BP Location: Left arm, Patient Position: Sitting)   Pulse 63   Temp 36.4 °C (97.6 °F) (Temporal)   Resp 16   Ht 1.727 m (5' 8\")   Wt 90.1 kg (198 lb 9.6 oz)   LMP 03/01/2024 (Exact Date)   SpO2 100%   BMI 30.20 kg/m²     Physical Exam  Vitals and nursing note reviewed.   Constitutional:       Appearance: Normal appearance.   HENT:      Head: Normocephalic.      Right Ear: Tympanic membrane and ear canal normal.      Left Ear: Tympanic membrane and ear canal normal.      Nose: Nose normal.      Mouth/Throat:      Mouth: Mucous membranes are moist.      Pharynx: Uvula midline. Pharyngeal swelling and posterior oropharyngeal erythema present.      Tonsils: No tonsillar exudate. 2+ on the right. 2+ on the left.   Cardiovascular:      Rate and Rhythm: Normal rate and regular rhythm.      Heart sounds: Normal heart sounds.   Pulmonary:      Effort: Pulmonary effort is normal.      Breath sounds: Normal breath sounds.   Abdominal:      General: Bowel sounds are normal.      Palpations: Abdomen is soft.   Lymphadenopathy:      Cervical: Cervical adenopathy present.   Skin:     General: Skin is warm and dry.      Capillary Refill: Capillary refill takes less than 2 seconds.      Findings: No rash.   Neurological:      Mental Status: She is alert and oriented to person, place, and time.         Assessment/Plan:       Results for orders placed or performed in visit on 03/01/24   POCT CEPHEID GROUP A STREP - PCR   Result Value Ref Range    POC Group A Strep, PCR Not Detected Not Detected, Invalid       Diagnosis and " associated orders:   1. Pharyngitis, unspecified etiology  - POCT CEPHEID GROUP A STREP - PCR    2. Bacterial sinusitis  - amoxicillin-clavulanate (AUGMENTIN) 875-125 MG Tab; Take 1 Tablet by mouth 2 times a day for 7 days.  Dispense: 14 Tablet; Refill: 0        Comments/MDM:       Strep test ordered per patient's request. Strep is negative.   Increase clear fluid intake, rest.  Continue otc Antihistamines, Flonase  Salt water gargles, ice chips to soothe throat, lozenges  Increase emitted use humidifier by bedside.    Exposure to steam for expectoration.  Nasal saline as needed.  Return to UC if not improved over the next 2-3 days   Follow-up with primary care advised.         Return to clinic or go to ED if symptoms worsen or persist. Indications for ED discussed at length. Patient/Parent/Guardian voices understanding. Follow-up with your primary care provider in 3-5 days. Red flag symptoms discussed. All side effects of medication discussed including allergic response, GI upset, tendon injury, rash, sedation etc.    Please note that this dictation was created using voice recognition software. I have made a reasonable attempt to correct obvious errors, but I expect that there are errors of grammar and possibly content that I did not discover before finalizing the note.    This note was electronically signed by JONAS Salas

## 2024-03-12 ENCOUNTER — OFFICE VISIT (OUTPATIENT)
Dept: BEHAVIORAL HEALTH | Facility: CLINIC | Age: 29
End: 2024-03-12
Payer: OTHER GOVERNMENT

## 2024-03-12 DIAGNOSIS — F31.81 BIPOLAR 2 DISORDER (HCC): ICD-10-CM

## 2024-03-12 PROCEDURE — 90834 PSYTX W PT 45 MINUTES: CPT | Performed by: COUNSELOR

## 2024-03-12 NOTE — PROGRESS NOTES
[facility]   Therapy Progress Note        Name: Edward Gan  MRN: [id number]  : 1995  Age: 28 y.o.  Date of assessment: 3/12/2024  PCP: Quintin Reagan M.D.  Persons in attendance: Patient  Total session time: 50 minutes      Topics addressed in psychotherapy include: Patient presents on time for scheduled appointment. Behavior: Patient reports stress related to 's , but  handled situation effectively. Discussed her parents' thoughts on having her brother knowing that he will be born with a major illness, family planning, law school on the back burner. Intervention: Therapist actively listened to patient's dilemma around her 's  and reflected back strengths in 's communication and response to the situation. Therapist engaged patient in a conversation regarding family planning. Therapist highlighted back patient's strengths in her work. Response: Patient was receptive to above interventions. Patient thanked therapist for validated that her  was communicating effectively and handling the situation well. Patient shared that she is open to having kids, but  is currently not producing enough sperm and she also has PCOS, so she is okay if she does not end up having kids. Patient shared that her parents considered getting an  when they discovered their son was going to have severe physical challenges, but their [employer] prevented them from doing so. Patient shared that her dad's brother had a big reaction to them giving birth to a son who was likely going to experience racism while also struggling with a major illness. Patient acknowledged that last session was very helpful for her and shared the parts of her job she enjoys and feels fulfillment in. Plan: Patient will return to therapy in 2-3 weeks.     Objective Observations:   Participation:Active verbal participation, Attentive, and Engaged   Grooming:Casual  and Neat   Cognition:Alert and Fully Oriented   Eye Contact:Good   Mood:Happy   Affect:Congruent with content   Thought Process:Logical   Speech:Rate within normal limits and Volume within normal limits    Current Risk:   Suicide: low   Homicide: low   Self-Harm: low   Relapse: low   Safety Plan Reviewed: not applicable    Care Plan Updated: No    Does patient express agreement with the above plan? Yes     Diagnosis:  1. Bipolar 2 disorder (HCC)        Referral appointment(s) scheduled? No       CHETNA Chaudhry.

## 2024-03-26 ENCOUNTER — OFFICE VISIT (OUTPATIENT)
Dept: BEHAVIORAL HEALTH | Facility: CLINIC | Age: 29
End: 2024-03-26
Payer: OTHER GOVERNMENT

## 2024-03-26 DIAGNOSIS — F31.81 BIPOLAR 2 DISORDER (HCC): ICD-10-CM

## 2024-03-26 PROCEDURE — 99999 PR NO CHARGE: CPT | Performed by: COUNSELOR

## 2024-03-26 NOTE — PROGRESS NOTES
[facility]   Therapy Progress Note        Name: Edward Gan  MRN: [id number]  : 1995  Age: 28 y.o.  Date of assessment: 3/26/2024  PCP: Quintin Reagan M.D.  Persons in attendance: Patient  Total session time: 45 minutes      Topics addressed in psychotherapy include: Behavior: Patient reports feeling at ease in her marriage and notes boundaries  has been able to maintain and communications skills that have been supportive. Patient reports that she has a new friend who is also from the [place], which is nice for patient to have someone who she has so much in common with. Patient reports that things at work are also going well and life in general feels happy. Interventions: Therapist actively listened to patient and reflected back strengths and accomplishments back to patient. Therapist engaged patient in a conversation regarding her wedding and relationship with her . Therapist validated patient's experience in order to maintain self-esteem. Therapist supported patient in processing stressful events from youth. Response: Patient was receptive to above interventions. Patient shared how she and her  began dating and noted that things felt like they were moving too fast, while patient also felt as though it all felt so right. Patient noted that she and her  began dating after dad left the family and described that situation as stressful. Patient shared that she and her  got  at a chapel in [facility] and that felt like the perfect wedding for them. Plan: Patient will return in 2-3 weeks for next therapy session.     Objective Observations:   Participation:Active verbal participation, Attentive, Engaged, and Open to feedback   Grooming:Casual and Neat   Cognition:Alert and Fully Oriented   Eye Contact:Good   Mood:Happy   Affect:Congruent with content, Bright, and Happy   Thought Process:Logical   Speech:Rate within normal limits and Volume  within normal limits    Current Risk:   Suicide: low   Homicide: low   Self-Harm: low   Relapse: low   Safety Plan Reviewed: not applicable    Care Plan Updated: No    Does patient express agreement with the above plan? Yes     Diagnosis:  1. Bipolar 2 disorder (HCC)        Referral appointment(s) scheduled? No       CHETNA Chaudhry.

## 2024-04-12 ENCOUNTER — TELEMEDICINE (OUTPATIENT)
Dept: BEHAVIORAL HEALTH | Facility: CLINIC | Age: 29
End: 2024-04-12
Payer: OTHER GOVERNMENT

## 2024-04-12 DIAGNOSIS — Z79.899 ENCOUNTER FOR LONG-TERM (CURRENT) USE OF MEDICATIONS: ICD-10-CM

## 2024-04-12 DIAGNOSIS — F90.2 ADHD (ATTENTION DEFICIT HYPERACTIVITY DISORDER), COMBINED TYPE: ICD-10-CM

## 2024-04-12 DIAGNOSIS — F41.1 GENERALIZED ANXIETY DISORDER: ICD-10-CM

## 2024-04-12 DIAGNOSIS — F31.81 BIPOLAR 2 DISORDER (HCC): ICD-10-CM

## 2024-04-12 PROCEDURE — 99999 PR NO CHARGE: CPT | Mod: 95 | Performed by: PSYCHIATRY & NEUROLOGY

## 2024-04-12 RX ORDER — DEXTROAMPHETAMINE SACCHARATE, AMPHETAMINE ASPARTATE MONOHYDRATE, DEXTROAMPHETAMINE SULFATE AND AMPHETAMINE SULFATE 3.75; 3.75; 3.75; 3.75 MG/1; MG/1; MG/1; MG/1
15 CAPSULE, EXTENDED RELEASE ORAL EVERY MORNING
Qty: 30 CAPSULE | Refills: 0 | Status: SHIPPED | OUTPATIENT
Start: 2024-05-11 | End: 2024-06-10

## 2024-04-12 RX ORDER — DEXTROAMPHETAMINE SACCHARATE, AMPHETAMINE ASPARTATE MONOHYDRATE, DEXTROAMPHETAMINE SULFATE AND AMPHETAMINE SULFATE 3.75; 3.75; 3.75; 3.75 MG/1; MG/1; MG/1; MG/1
15 CAPSULE, EXTENDED RELEASE ORAL EVERY MORNING
Qty: 30 CAPSULE | Refills: 0 | Status: SHIPPED | OUTPATIENT
Start: 2024-06-09 | End: 2024-07-09

## 2024-04-12 RX ORDER — ARIPIPRAZOLE 10 MG/1
10 TABLET ORAL
Qty: 90 TABLET | Refills: 0 | Status: SHIPPED | OUTPATIENT
Start: 2024-04-12

## 2024-04-12 RX ORDER — CITALOPRAM HYDROBROMIDE 10 MG/1
10 TABLET ORAL EVERY MORNING
Qty: 90 TABLET | Refills: 0 | Status: SHIPPED | OUTPATIENT
Start: 2024-04-12

## 2024-04-12 RX ORDER — DEXTROAMPHETAMINE SACCHARATE, AMPHETAMINE ASPARTATE MONOHYDRATE, DEXTROAMPHETAMINE SULFATE AND AMPHETAMINE SULFATE 3.75; 3.75; 3.75; 3.75 MG/1; MG/1; MG/1; MG/1
15 CAPSULE, EXTENDED RELEASE ORAL EVERY MORNING
Qty: 30 CAPSULE | Refills: 0 | Status: SHIPPED | OUTPATIENT
Start: 2024-04-12 | End: 2024-05-12

## 2024-04-12 NOTE — PROGRESS NOTES
PSYCHIATRY VIRTUAL VISIT FOLLOW-UP NOTE    Chief Complaint   Patient presents with    Follow-Up     Mood, anxiety, ADHD     This evaluation was conducted via Zoom using secure and encrypted videoconferencing technology.   The patient was in a private location outside of their home in the [facility].    The patient's identity was confirmed and verbal consent was obtained for this virtual visit.    History Of Present Illness:  Edward Gan is a 28 y.o. female with bipolar mood disorder, generalized anxiety disorder, PTSD, ADHD comes in today for follow up, was last seen 3 months ago.  She has been doing good in regards to her mental health.  She has been accepted into 4 law schools but she has decided to defer her admission for another year.  She has been thinking a lot about her reasons to go to law school and wants to make sure that this is the right decision for her.  She has been really enjoying a part of the business and financially things have been going good.  She wants to continue working for her business.  She and her  have been doing good in regards to their relationship, mentions that they are still  but they have been spending a lot of time together and things have been a good between them.  She has been compliant with her medications, denies any significant episodes of depression or hypomania.  She has been doing good in regards to her anxiety as well.  She denies any concerns regarding sleep.  She has been taking Adderall about 3 days a week and has noticed that there are some struggles towards the end of the day.  She denies having thoughts of wanting to hurt herself.    Social History:   She is  and  x 1, lives with  and adult step son (with intellectual disability) in [place], step daughter lives in [place], Bachelor's degree in History, deferred law school admission to 2025, mother and 2 younger siblings live in [place], father lives in [place],  she and  own financial planning business.     Substance Use:  Alcohol - Infrequent use  Nicotine - Denies   Cannabis - Smokes couples of times a week  Illicit drugs - Denies    Past Medication Trials:  Zoloft, Risperdal IM, Ativan (effective)    Psychological testing with Dr. Emilia Park, Psy.D (11/3/2021): mild ADHD, combined type    Medications:  Current Outpatient Medications   Medication Sig Dispense Refill    amphetamine-dextroamphetamine (ADDERALL XR) 15 MG XR capsule Take 1 Capsule by mouth every morning for 30 days. 30 Capsule 0    [START ON 5/11/2024] amphetamine-dextroamphetamine (ADDERALL XR) 15 MG XR capsule Take 1 Capsule by mouth every morning for 30 days. 30 Capsule 0    citalopram (CELEXA) 10 MG tablet Take 1 Tablet by mouth every morning. 90 Tablet 0    ARIPiprazole (ABILIFY) 10 MG Tab Take 1 Tablet by mouth at bedtime. 90 Tablet 0    [START ON 6/9/2024] amphetamine-dextroamphetamine (ADDERALL XR) 15 MG XR capsule Take 1 Capsule by mouth every morning for 30 days. 30 Capsule 0    hydrOXYzine HCl (ATARAX) 25 MG Tab Take 1 Tablet by mouth 1 time a day as needed for Anxiety. 30 Tablet 0    LORYNA 3-0.02 MG per tablet Take 1 Tablet by mouth every day.       No current facility-administered medications for this visit.     Review Of Systems:    Psychiatric - Positive for anxiety, depression, impaired concentration     Physical Examination:  Vital signs: There were no vitals taken for this visit.    Musculoskeletal: No abnormal movements.     Mental Status Evaluation:   General: Young female, dressed in casual attire, good grooming and hygiene, in no apparent distress, calm and cooperative, good eye contact, no psychomotor agitation or retardation  Orientation: Alert and oriented to person, place and time  Recent and remote memory: Grossly intact  Attention span and concentration: Grossly intact  Speech: Spontaneous, normal rate, rhythm and tone  Thought Process: Linear, logical and goal  "directed  Thought Content: Denies suicidal or homicidal ideations, intent or plan  Perception: No delusions noted  Associations: Intact  Language: Appropriate  Fund of knowledge and vocabulary: Grossly adequate  Mood: \"alright\"  Affect: Dysphoric at times, mood congruent  Insight: Good  Judgment: Good    Depression screenin/15/2020     8:30 AM 2022    12:00 PM 2024     1:30 PM   Depression Screen (PHQ-2/PHQ-9)   PHQ-2 Total Score 1 0 0   PHQ-9 Total Score 13       Interpretation of PHQ-9 Total Score    Score Severity   1-4 No Depression   5-9 Mild Depression   10-14 Moderate Depression   15-19 Moderately Severe Depression   20-27 Severe Depression    Medical Records/Labs/Diagnostic Tests Reviewed:  [place] PDMP records - appropriate refills    Impression:  1.  Bipolar 2 mood disorder - stable   2.  Generalized anxiety disorder - stable    3.  ADHD, combined type - stable   4.  Long-term use of medications - Abilify    Plan:  1.  Continue Celexa 10 mg in the morning for anxiety and depression  2.  Continue Abilify 10 mg at bedtime for mood stabilization  -Metabolic monitoring (2023): Lipid profile normal, A1c normal  -Repeat yearly metabolic monitoring labs due in 2024, A1c and lipid profile ordered today  3.  Continue Hydroxyzine 25 mg daily as needed for anxiety  4.  Continue Adderall XR 15 mg in the morning for ADHD.  E-prescribed x 3 months.  I have advised her to start taking Adderall XR on a more regular basis basis especially on the days that she is working before adjusting dose.  5.  Continue individual psychotherapy with GIULIANA Chaudhry  6.  She was reminded that she needs to be in [place] for virtual appointments to meet insurance requirements.    Return to clinic in 3 months or sooner if symptoms worsen    The proposed treatment plan was discussed with the patient who was provided the opportunity to ask questions and make suggestions regarding alternative treatment. Patient verbalized " understanding and expressed agreement with the plan.     Sumaya Wright M.D.  04/12/24    This note was created using voice recognition software (Dragon). The accuracy of the dictation is limited by the abilities of the software. I have reviewed the note prior to signing, however some errors in grammar and context are still possible. If you have any questions related to this note please do not hesitate to contact our office.

## 2024-04-17 ENCOUNTER — OFFICE VISIT (OUTPATIENT)
Dept: BEHAVIORAL HEALTH | Facility: CLINIC | Age: 29
End: 2024-04-17
Payer: OTHER GOVERNMENT

## 2024-04-17 DIAGNOSIS — F31.81 BIPOLAR 2 DISORDER (HCC): ICD-10-CM

## 2024-04-17 PROCEDURE — 90834 PSYTX W PT 45 MINUTES: CPT | Performed by: COUNSELOR

## 2024-04-17 NOTE — PROGRESS NOTES
[facility]   Therapy Progress Note        Name: Edward Gan  MRN: [id number]  : 1995  Age: 28 y.o.  Date of assessment: 2024  PCP: Quintin Reagan M.D.  Persons in attendance: Patient  Total session time: 50 minutes      Topics addressed in psychotherapy include: Behavior: Patient reports sadness around brother and recent court date involving brother. Patient reports positive shifts in her marriage. Patient reports feeling fulfilled at work.   Intervention: Therapist actively listened and validated patient's experience. Therapist highlighted strengths in her marriage and healthy communication skills. Therapist reflected back to patient emotions related to work.   Response: Patient was receptive to above interventions. Patient acknowledged that her siblings' struggles impact her greatly and she is there for them, while also maintaining boundaries. Patient acknowledged how well her marriage is doing and plans they have for the future. Patient shared that she saw her mom recently and shared that it was a positive experience for her.   Plan: Patient shares that she would like to talk about her mom more at next session. Patient will return in 2-3 weeks for next therapy session.     Objective Observations:   Participation:Active verbal participation, Attentive, Engaged, and Open to feedback   Grooming:Casual and Neat   Cognition:Alert, Fully Oriented, and Drowsy/Somnolent   Eye Contact:Good   Mood:Depressed   Affect:Congruent with content, Sad, and Tearful   Thought Process:Logical   Speech:Rate within normal limits and Volume within normal limits    Current Risk:   Suicide: low   Homicide: low   Self-Harm: low   Relapse: low   Safety Plan Reviewed: not applicable    Care Plan Updated: No    Does patient express agreement with the above plan? Yes     Diagnosis:  Bipolar 2 Disorder    Referral appointment(s) scheduled? No       ALMA Chaudhry

## 2024-04-18 ENCOUNTER — OFFICE VISIT (OUTPATIENT)
Dept: MEDICAL GROUP | Facility: MEDICAL CENTER | Age: 29
End: 2024-04-18
Payer: OTHER GOVERNMENT

## 2024-04-18 VITALS
HEART RATE: 65 BPM | DIASTOLIC BLOOD PRESSURE: 64 MMHG | BODY MASS INDEX: 29.07 KG/M2 | RESPIRATION RATE: 16 BRPM | HEIGHT: 68 IN | TEMPERATURE: 97.7 F | SYSTOLIC BLOOD PRESSURE: 110 MMHG | OXYGEN SATURATION: 98 % | WEIGHT: 191.8 LBS

## 2024-04-18 DIAGNOSIS — Z00.00 ANNUAL PHYSICAL EXAM: ICD-10-CM

## 2024-04-18 PROCEDURE — 3074F SYST BP LT 130 MM HG: CPT | Performed by: FAMILY MEDICINE

## 2024-04-18 PROCEDURE — 99395 PREV VISIT EST AGE 18-39: CPT | Performed by: FAMILY MEDICINE

## 2024-04-18 PROCEDURE — 3078F DIAST BP <80 MM HG: CPT | Performed by: FAMILY MEDICINE

## 2024-04-18 ASSESSMENT — LIFESTYLE VARIABLES
DURING THE PAST 12 MONTHS, ON HOW MANY DAYS DID YOU USE ANY TOBACCO OR NICOTINE PRODUCTS: 0
DURING THE PAST 12 MONTHS, ON HOW MANY DAYS DID YOU DRINK MORE THAN A FEW SIPS OF BEER, WINE, OR ANY DRINK CONTAINING ALCOHOL: 10
DURING THE PAST 12 MONTHS, ON HOW MANY DAYS DID YOU USE ANYTHING ELSE TO GET HIGH: 0
PART A TOTAL SCORE: 34
DURING THE PAST 12 MONTHS, ON HOW MANY DAYS DID YOU USE ANY MARIJUANA: 24
DURING THE PAST 12 MONTHS, ON HOW MANY DAYS DID YOU USE ANYTHING ELSE TO GET HIGH: 0
DURING THE PAST 12 MONTHS, ON HOW MANY DAYS DID YOU DRINK MORE THAN A FEW SIPS OF BEER, WINE, OR ANY DRINK CONTAINING ALCOHOL: 0
DURING THE PAST 12 MONTHS, ON HOW MANY DAYS DID YOU USE ANY TOBACCO OR NICOTINE PRODUCTS: 0

## 2024-04-18 ASSESSMENT — ENCOUNTER SYMPTOMS
CHILLS: 0
FOCAL WEAKNESS: 0
ABDOMINAL PAIN: 0
HEMOPTYSIS: 0
SHORTNESS OF BREATH: 0
DIARRHEA: 0
EYE REDNESS: 0
NAUSEA: 0
NERVOUS/ANXIOUS: 0
SORE THROAT: 0
EYE PAIN: 0
SPUTUM PRODUCTION: 0
FEVER: 0
DIZZINESS: 0
MYALGIAS: 0
DEPRESSION: 0
EYE DISCHARGE: 0
COUGH: 0
HEADACHES: 0
CONSTIPATION: 0
VOMITING: 0
PALPITATIONS: 0
SENSORY CHANGE: 0
SINUS PAIN: 0
WHEEZING: 0

## 2024-04-18 ASSESSMENT — FIBROSIS 4 INDEX: FIB4 SCORE: 0.19

## 2024-04-18 NOTE — PROGRESS NOTES
Assessment/Plan:    Edward was seen today for annual exam.    Diagnoses and all orders for this visit:    Annual physical exam       She is up-to-date for cancer screenings.  Request Pap smear records from OB/GYN.  Recommended to get COVID-vaccine at pharmacy.  Anticipatory guidance discussed below in the note.  Follow-up in 1 year for annual physical.        Chief Complaint.    Chief Complaint   Patient presents with    Annual Exam           Health Maintenance  Advanced directive: n/a  Osteoporosis Screen/ DEXA: n/a  PT/vit D for falls prevention: n/a   Cholesterol Screening:   Lab Results   Component Value Date/Time    CHOLSTRLTOT 184 02/24/2022 0727    TRIGLYCERIDE 112 02/24/2022 0727    HDL 76 02/24/2022 0727    LDL 86 02/24/2022 0727      Diabetes Screening:   Lab Results   Component Value Date/Time    HBA1C 4.9 02/24/2022 07:27 AM       AAA Screening (65 to 74 year male): n/a   Aspirin Use: n/a    Below anticipatory guidance discussed with patient  Diet:  counseled regarding eating healthy diet  Exercise: Counseled regarding exercise 150 minutes in a week  Substance Abuse: None  Safe in relationship.  Yes  Seat belts, bike helmet, gun safety discussed.  Sun protection use discussed    Cancer screening  Colorectal Cancer Screening: Colon cancer screening started age 47  Lung Cancer Screening: She does not smoke  Cervical Cancer Screening: Had Pap smear done with OB/GYN, request records  Breast Cancer Screening: Breast cancer screening start at age 40    Infectious disease screening/Immunizations  --STI Screening: None  --Immunizations: Recommended to get COVID-vaccine at pharmacy       Review of Systems   Constitutional:  Negative for chills, fever and malaise/fatigue.   HENT:  Negative for ear discharge, ear pain, hearing loss, sinus pain and sore throat.    Eyes:  Negative for pain, discharge and redness.   Respiratory:  Negative for cough, hemoptysis, sputum production, shortness of breath and wheezing.   "  Cardiovascular:  Negative for chest pain, palpitations and leg swelling.   Gastrointestinal:  Negative for abdominal pain, constipation, diarrhea, nausea and vomiting.   Genitourinary:  Negative for dysuria, frequency and urgency.   Musculoskeletal:  Negative for joint pain and myalgias.   Skin:  Negative for itching and rash.   Neurological:  Negative for dizziness, sensory change, focal weakness and headaches.   Psychiatric/Behavioral:  Negative for depression. The patient is not nervous/anxious.             Current Outpatient Medications   Medication Sig Dispense Refill    amphetamine-dextroamphetamine (ADDERALL XR) 15 MG XR capsule Take 1 Capsule by mouth every morning for 30 days. 30 Capsule 0    [START ON 5/11/2024] amphetamine-dextroamphetamine (ADDERALL XR) 15 MG XR capsule Take 1 Capsule by mouth every morning for 30 days. 30 Capsule 0    citalopram (CELEXA) 10 MG tablet Take 1 Tablet by mouth every morning. 90 Tablet 0    ARIPiprazole (ABILIFY) 10 MG Tab Take 1 Tablet by mouth at bedtime. 90 Tablet 0    [START ON 6/9/2024] amphetamine-dextroamphetamine (ADDERALL XR) 15 MG XR capsule Take 1 Capsule by mouth every morning for 30 days. 30 Capsule 0    hydrOXYzine HCl (ATARAX) 25 MG Tab Take 1 Tablet by mouth 1 time a day as needed for Anxiety. 30 Tablet 0    LORYNA 3-0.02 MG per tablet Take 1 Tablet by mouth every day.       No current facility-administered medications for this visit.        No Known Allergies      /64   Pulse 65   Temp 36.5 °C (97.7 °F)   Resp 16   Ht 1.727 m (5' 8\")   Wt 87 kg (191 lb 12.8 oz)   SpO2 98%  Body mass index is 29.16 kg/m².      Physical Exam  Constitutional:       General: She is not in acute distress.     Appearance: Normal appearance. She is well-developed and well-groomed. She is not ill-appearing or diaphoretic.   HENT:      Head: Normocephalic and atraumatic.      Right Ear: Tympanic membrane, ear canal and external ear normal.      Left Ear: Tympanic " membrane, ear canal and external ear normal.      Nose: Nose normal. No congestion or rhinorrhea.      Mouth/Throat:      Mouth: Mucous membranes are moist.      Pharynx: No oropharyngeal exudate or posterior oropharyngeal erythema.   Eyes:      General:         Right eye: No discharge.         Left eye: No discharge.      Conjunctiva/sclera: Conjunctivae normal.      Pupils: Pupils are equal, round, and reactive to light.   Neck:      Thyroid: No thyromegaly.   Cardiovascular:      Rate and Rhythm: Normal rate and regular rhythm.      Heart sounds: Normal heart sounds. No murmur heard.     No gallop.   Pulmonary:      Effort: Pulmonary effort is normal. No respiratory distress.      Breath sounds: Normal breath sounds. No wheezing or rales.   Abdominal:      General: Bowel sounds are normal. There is no distension.      Palpations: Abdomen is soft. There is no mass.      Tenderness: There is no abdominal tenderness. There is no guarding.   Musculoskeletal:         General: Normal range of motion.      Cervical back: Neck supple.      Right lower leg: No edema.      Left lower leg: No edema.   Lymphadenopathy:      Cervical: No cervical adenopathy.   Skin:     General: Skin is warm.      Findings: No erythema or rash.   Neurological:      General: No focal deficit present.      Mental Status: She is alert. Mental status is at baseline.      Motor: No abnormal muscle tone.   Psychiatric:         Mood and Affect: Mood and affect normal.         Behavior: Behavior normal.         Judgment: Judgment normal.                Please note that this dictation was created using voice recognition software. I have made every reasonable attempt to correct obvious errors, but I expect that there are errors of grammar and possibly content that I did not discover before finalizing the note.

## 2024-04-18 NOTE — LETTER
[employer] [facility]  Quintin Reagan M.D.  [address]  [place] [address]  Fax: [phone]   Authorization for Release/Disclosure of   Protected Health Information   Name: EDWARD GAN : 1995 SSN: [id number]   Address: [address] [place] [address] [address] [place] [address] Phone:    [phone] (home)    I authorize the entity listed below to release/disclose the PHI below to:   [facility]/Quintin Reagan M.D. and Quintin Reagan M.D.   Provider or Entity Name:     Address   City, State, Zip   Phone:      Fax:     Reason for request: continuity of care   Information to be released:    [  ] LAST COLONOSCOPY,  including any PATH REPORT and follow-up  [  ] LAST FIT/COLOGUARD RESULT [  ] LAST DEXA  [  ] LAST MAMMOGRAM  [  ] LAST PAP  [  ] LAST LABS [  ] RETINA EXAM REPORT  [  ] IMMUNIZATION RECORDS  [  ] Release all info      [  ] Check here and initial the line next to each item to release ALL health information INCLUDING  _____ Care and treatment for drug and / or alcohol abuse  _____ HIV testing, infection status, or AIDS  _____ Genetic Testing    DATES OF SERVICE OR TIME PERIOD TO BE DISCLOSED: _____________  I understand and acknowledge that:  * This Authorization may be revoked at any time by you in writing, except if your health information has already been used or disclosed.  * Your health information that will be used or disclosed as a result of you signing this authorization could be re-disclosed by the recipient. If this occurs, your re-disclosed health information may no longer be protected by State or Federal laws.  * You may refuse to sign this Authorization. Your refusal will not affect your ability to obtain treatment.  * This Authorization becomes effective upon signing and will  on (date) __________.      If no date is indicated, this Authorization will  one (1) year from the signature date.    Name: Edward Gan  Signature: Date:   2024     PLEASE FAX  REQUESTED RECORDS BACK TO: [phone]

## 2024-04-23 ENCOUNTER — APPOINTMENT (OUTPATIENT)
Dept: BEHAVIORAL HEALTH | Facility: CLINIC | Age: 29
End: 2024-04-23
Payer: OTHER GOVERNMENT

## 2024-05-15 ENCOUNTER — APPOINTMENT (OUTPATIENT)
Dept: BEHAVIORAL HEALTH | Facility: CLINIC | Age: 29
End: 2024-05-15
Payer: OTHER GOVERNMENT

## 2024-05-29 ENCOUNTER — APPOINTMENT (OUTPATIENT)
Dept: BEHAVIORAL HEALTH | Facility: CLINIC | Age: 29
End: 2024-05-29
Payer: OTHER GOVERNMENT

## 2024-05-29 DIAGNOSIS — F31.81 BIPOLAR 2 DISORDER (HCC): ICD-10-CM

## 2024-05-29 NOTE — PROGRESS NOTES
[facility]   Therapy Progress Note        Name: Edward Gan  MRN: [id number]  : 1995  Age: 28 y.o.  Date of assessment: 2024  PCP: Quintin Reagan M.D.  Persons in attendance: Patient  Total session time: 55 minutes      Topics addressed in psychotherapy include: Behavior: Patients reports overall improvement in MH symptoms. Patient reports moderate stress related to her brother and his court case, and her relationship with both of her parents. Patient reports that she and her  are doing well and communicating more effectively. Interventions: Therapist actively listened and reflected back emotions. Therapist supported patient in processing emotions related to her brother's court case. Therapist highlighted strength and accomplishments in her marriage and career in order to maintain self-esteem. Response: Patient was receptive to above interventions. Patient acknowledged boundaries she enjoys having with her family and is open to being more flexible in some areas. Patient expanded on her marriage and their goals for the future and shared that  they are both in a really good healthy place. Plan: Patient returns in 2 weeks for next therapy session.     Objective Observations:   Participation:Active verbal participation, Attentive, Engaged, and Open to feedback   Grooming:Casual and Neat   Cognition:Alert and Fully Oriented   Eye Contact:Good   Mood:Happy   Affect:Congruent with content, Tearful, and Happy   Thought Process:Logical   Speech:Rate within normal limits and Volume within normal limits    Current Risk:   Suicide: low   Homicide: low   Self-Harm: low   Relapse: low   Safety Plan Reviewed: not applicable    Care Plan Updated: No    Does patient express agreement with the above plan? Yes     Diagnosis:  1. Bipolar 2 disorder (HCC)        Referral appointment(s) scheduled? No       ALMA Chaudhry

## 2024-05-30 NOTE — TELEPHONE ENCOUNTER
Please send to [facility].   Quality 130: Documentation Of Current Medications In The Medical Record: Current Medications Documented Quality 431: Preventive Care And Screening: Unhealthy Alcohol Use - Screening: Patient not identified as an unhealthy alcohol user when screened for unhealthy alcohol use using a systematic screening method Detail Level: Simple Quality 226: Preventive Care And Screening: Tobacco Use: Screening And Cessation Intervention: Patient screened for tobacco use and is an ex/non-smoker

## 2024-06-11 ENCOUNTER — OFFICE VISIT (OUTPATIENT)
Dept: BEHAVIORAL HEALTH | Facility: CLINIC | Age: 29
End: 2024-06-11
Payer: OTHER GOVERNMENT

## 2024-06-11 DIAGNOSIS — F31.81 BIPOLAR 2 DISORDER (HCC): ICD-10-CM

## 2024-06-11 PROCEDURE — 90834 PSYTX W PT 45 MINUTES: CPT | Performed by: COUNSELOR

## 2024-06-11 NOTE — PROGRESS NOTES
[facility]   Therapy Progress Note        Name: Edward Gan  MRN: [id number]  : 1995  Age: 28 y.o.  Date of assessment: 2024  PCP: Quintin Reagan M.D.  Persons in attendance: Patient  Total session time: 50 minutes      Topics addressed in psychotherapy include: Behavior: Patient reports things are going well in life as it pertains to job, marriage, and social life. Patient shares some stress related to brother and his court case. Patient expanded on her relationship with her dad, and family dynamics present when she was growing up. Patient shared that she would have panic attacks that manifested in feeling itchy all over her body, which prompted her PCP/parents to refer her to therapy which was her first experience in therapy. Interventions: Therapist actively listened and reflected back patient's uplifted mood when sharing how well her personal life is going. Therapist supported patient in processing emotions related to her brother and his court case. Therapist explored patient's thoughts and feelings around rebuilding her relationship with her dad. Response: Patient was receptive to above interventions. Patient was able to process emotions related to her brother highlighting themes of survivor guilt. Patient shares that she would like to rebuild her relationship with her dad and acknowledges that she would like to feel validated in her experience as a kid. Patient shares that her sister stopped talking to her shortly after her manic episode and she is wanting to reconnect with her sister at some point. Plan: Patient returns for next therapy session     Objective Observations:   Participation:Active verbal participation, Attentive, Engaged, and Open to feedback   Grooming:Casual and Neat   Cognition:Alert and Fully Oriented   Eye Contact:Good   Mood:Anxious and Happy   Affect:Congruent with content, Sad, Anxious, Tearful, Bright, and Happy   Thought Process:Logical and  Goal-directed   Speech:Rate within normal limits and Volume within normal limits    Current Risk:   Suicide: low   Homicide: low   Self-Harm: low   Relapse: low   Safety Plan Reviewed: not applicable    Care Plan Updated: No    Does patient express agreement with the above plan? Yes     Diagnosis:  1. Bipolar 2 disorder (HCC)        Referral appointment(s) scheduled? No       CHETNA Chaudhry.

## 2024-06-26 ENCOUNTER — APPOINTMENT (OUTPATIENT)
Dept: BEHAVIORAL HEALTH | Facility: CLINIC | Age: 29
End: 2024-06-26
Payer: OTHER GOVERNMENT

## 2024-07-09 ENCOUNTER — TELEMEDICINE (OUTPATIENT)
Dept: BEHAVIORAL HEALTH | Facility: CLINIC | Age: 29
End: 2024-07-09
Payer: OTHER GOVERNMENT

## 2024-07-09 ENCOUNTER — HOSPITAL ENCOUNTER (OUTPATIENT)
Dept: LAB | Facility: MEDICAL CENTER | Age: 29
End: 2024-07-09
Attending: PSYCHIATRY & NEUROLOGY
Payer: OTHER GOVERNMENT

## 2024-07-09 DIAGNOSIS — Z79.899 ENCOUNTER FOR LONG-TERM (CURRENT) USE OF MEDICATIONS: ICD-10-CM

## 2024-07-09 DIAGNOSIS — F90.2 ADHD (ATTENTION DEFICIT HYPERACTIVITY DISORDER), COMBINED TYPE: ICD-10-CM

## 2024-07-09 DIAGNOSIS — F31.81 BIPOLAR 2 DISORDER (HCC): ICD-10-CM

## 2024-07-09 DIAGNOSIS — F41.1 GENERALIZED ANXIETY DISORDER: ICD-10-CM

## 2024-07-09 LAB
CHOLEST SERPL-MCNC: 168 MG/DL (ref 100–199)
EST. AVERAGE GLUCOSE BLD GHB EST-MCNC: 100 MG/DL
FASTING STATUS PATIENT QL REPORTED: NORMAL
HBA1C MFR BLD: 5.1 % (ref 4–5.6)
HDLC SERPL-MCNC: 69 MG/DL
LDLC SERPL CALC-MCNC: 89 MG/DL
TRIGL SERPL-MCNC: 52 MG/DL (ref 0–149)

## 2024-07-09 PROCEDURE — 83036 HEMOGLOBIN GLYCOSYLATED A1C: CPT

## 2024-07-09 PROCEDURE — 99214 OFFICE O/P EST MOD 30 MIN: CPT | Mod: 95 | Performed by: PSYCHIATRY & NEUROLOGY

## 2024-07-09 PROCEDURE — 36415 COLL VENOUS BLD VENIPUNCTURE: CPT

## 2024-07-09 PROCEDURE — 80061 LIPID PANEL: CPT

## 2024-07-09 RX ORDER — ARIPIPRAZOLE 10 MG/1
10 TABLET ORAL
Qty: 90 TABLET | Refills: 0 | Status: SHIPPED | OUTPATIENT
Start: 2024-07-09

## 2024-07-09 RX ORDER — CITALOPRAM HYDROBROMIDE 10 MG/1
10 TABLET ORAL EVERY MORNING
Qty: 90 TABLET | Refills: 0 | Status: SHIPPED | OUTPATIENT
Start: 2024-07-09

## 2024-07-10 ENCOUNTER — APPOINTMENT (OUTPATIENT)
Dept: BEHAVIORAL HEALTH | Facility: CLINIC | Age: 29
End: 2024-07-10
Payer: OTHER GOVERNMENT

## 2024-07-31 ENCOUNTER — APPOINTMENT (OUTPATIENT)
Dept: BEHAVIORAL HEALTH | Facility: CLINIC | Age: 29
End: 2024-07-31
Payer: OTHER GOVERNMENT

## 2024-08-21 ENCOUNTER — OFFICE VISIT (OUTPATIENT)
Dept: BEHAVIORAL HEALTH | Facility: CLINIC | Age: 29
End: 2024-08-21
Payer: OTHER GOVERNMENT

## 2024-08-21 DIAGNOSIS — F31.81 BIPOLAR 2 DISORDER (HCC): ICD-10-CM

## 2024-08-21 PROCEDURE — 90834 PSYTX W PT 45 MINUTES: CPT | Performed by: COUNSELOR

## 2024-08-21 NOTE — PROGRESS NOTES
[facility]   Therapy Progress Note        Name: Edward Gan  MRN: [id number]  : 1995  Age: 28 y.o.  Date of assessment: 2024  PCP: Quintin Reagan M.D.  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 Patient shares emotions/sadness she has towards her siblings and feeling the need to help them, while also maintaining healthy boundaries. Patient shares that her mom may come up to visit her and she is unsure how that will go and acknowledges wanting to talk to her mom about her childhood.   Interventions: Therapist actively listened and reflected back emotions. Therapist validated patient's emotions towards her siblings and highlighted patient's healthy boundaries. Therapist supported patient in processing emotions related to her family.   Response: Patient was receptive to above interventions. Patient acknowledges that her boundaries have been really helpful and acknowledges there is only so much she is able to do to help her family. Patient was able to process some of her emotions towards her family members.   Plan: Patient returns on 2024 for next therapy session.     Objective Observations:   Participation:Active verbal participation, Attentive, and Engaged   Grooming:Casual and Neat   Cognition:Alert, Fully Oriented, and Drowsy/Somnolent   Eye Contact:Good   Mood:Happy   Affect:Congruent with content   Thought Process:Logical and Circumstantial   Speech:Rate within normal limits and Volume within normal limits    Current Risk:   Suicide: low   Homicide: low   Self-Harm: low   Relapse: low   Safety Plan Reviewed: not applicable    Care Plan Updated: No    Does patient express agreement with the above plan?  N/A      Diagnosis:  1. Bipolar 2 disorder (HCC)        Referral appointment(s) scheduled? No       Love Wheeler  ALMA

## 2024-08-26 ENCOUNTER — PATIENT MESSAGE (OUTPATIENT)
Dept: BEHAVIORAL HEALTH | Facility: CLINIC | Age: 29
End: 2024-08-26
Payer: OTHER GOVERNMENT

## 2024-08-26 DIAGNOSIS — F90.2 ADHD (ATTENTION DEFICIT HYPERACTIVITY DISORDER), COMBINED TYPE: ICD-10-CM

## 2024-08-27 RX ORDER — DEXTROAMPHETAMINE SACCHARATE, AMPHETAMINE ASPARTATE MONOHYDRATE, DEXTROAMPHETAMINE SULFATE AND AMPHETAMINE SULFATE 3.75; 3.75; 3.75; 3.75 MG/1; MG/1; MG/1; MG/1
15 CAPSULE, EXTENDED RELEASE ORAL EVERY MORNING
Qty: 30 CAPSULE | Refills: 0 | Status: SHIPPED | OUTPATIENT
Start: 2024-08-27 | End: 2024-09-26

## 2024-08-27 NOTE — PATIENT COMMUNICATION
Received request via: Patient    Was the patient seen in the last year in this department? Yes    Does the patient have an active prescription (recently filled or refills available) for medication(s) requested? No    Pharmacy Name: [facility]    Does the patient have group home Plus and need 100-day supply? (This applies to ALL medications) Patient does not have SCP

## 2024-09-11 ENCOUNTER — OFFICE VISIT (OUTPATIENT)
Dept: BEHAVIORAL HEALTH | Facility: CLINIC | Age: 29
End: 2024-09-11
Payer: OTHER GOVERNMENT

## 2024-09-11 DIAGNOSIS — F31.81 BIPOLAR 2 DISORDER (HCC): ICD-10-CM

## 2024-09-11 PROCEDURE — 90834 PSYTX W PT 45 MINUTES: CPT | Performed by: COUNSELOR

## 2024-09-11 NOTE — PROGRESS NOTES
[facility]   Therapy Progress Note        Name: Edward Gan  MRN: [id number]  : 1995  Age: 29 y.o.  Date of assessment: 2024  PCP: Quintin Reagan M.D.  Persons in attendance: Patient  Total session time: 50 minutes      Topics addressed in psychotherapy include: Behavior: Patient reports some conflict in her marriage and changes in boundaries and how she is struggling to communicate her needs and feeling heard. 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 Patient shares a conflict with an old friend and how she resolved it.   Interventions: Therapist actively listened and reflected back emotions. Therapist validated patient's emotions around communicating her needs to her . Therapist highlighted patient's strengths in communication and boundary setting to increase confidence in having difficult conversations with her .   Response: Patient was receptive to above interventions. Patient acknowledged that she and her  have handled conflict well in the past, patient became tearful and highlighted that it would be beneficial for them to have this conversation when she is feeling less emotional.  Plan: Patient returns for next therapy session on 10/2/2024.     Objective Observations:   Participation:Active verbal participation, Attentive, Engaged, and Open to feedback   Grooming:Casual and Neat   Cognition:Alert and Fully Oriented   Eye Contact:Good   Mood:Euthymic    Affect:Congruent with content, Sad, and Tearful   Thought Process:Logical and Circumstantial   Speech:Rate within normal limits and Volume within normal limits    Current Risk:   Suicide: low   Homicide: low   Self-Harm: low   Relapse: low   Safety Plan Reviewed: not applicable    Care Plan Updated: No    Does patient express agreement with the above plan? Yes     Diagnosis:  1. Bipolar 2 disorder (HCC)        Referral appointment(s) scheduled? No       Love Wheeler  ALMA

## 2024-09-17 ENCOUNTER — OFFICE VISIT (OUTPATIENT)
Dept: MEDICAL GROUP | Facility: MEDICAL CENTER | Age: 29
End: 2024-09-17
Payer: OTHER GOVERNMENT

## 2024-09-17 VITALS
HEIGHT: 68 IN | DIASTOLIC BLOOD PRESSURE: 68 MMHG | TEMPERATURE: 97.8 F | WEIGHT: 190.92 LBS | SYSTOLIC BLOOD PRESSURE: 114 MMHG | BODY MASS INDEX: 28.94 KG/M2 | HEART RATE: 73 BPM | OXYGEN SATURATION: 98 %

## 2024-09-17 DIAGNOSIS — E28.2 PCOS (POLYCYSTIC OVARIAN SYNDROME): ICD-10-CM

## 2024-09-17 DIAGNOSIS — E66.3 OVERWEIGHT (BMI 25.0-29.9): ICD-10-CM

## 2024-09-17 PROCEDURE — 3074F SYST BP LT 130 MM HG: CPT | Performed by: FAMILY MEDICINE

## 2024-09-17 PROCEDURE — 3078F DIAST BP <80 MM HG: CPT | Performed by: FAMILY MEDICINE

## 2024-09-17 PROCEDURE — 99214 OFFICE O/P EST MOD 30 MIN: CPT | Performed by: FAMILY MEDICINE

## 2024-09-17 RX ORDER — METFORMIN HCL 500 MG
1000 TABLET, EXTENDED RELEASE 24 HR ORAL DAILY
Qty: 180 TABLET | Refills: 1 | Status: SHIPPED | OUTPATIENT
Start: 2024-09-17

## 2024-09-17 ASSESSMENT — ENCOUNTER SYMPTOMS
PALPITATIONS: 0
FEVER: 0
CHILLS: 0

## 2024-09-17 NOTE — PROGRESS NOTES
Verbal consent was acquired by the patient to use [employer] ambient listening note generation during this visit.      Edward was seen today for weight loss.    Diagnoses and all orders for this visit:    PCOS (polycystic ovarian syndrome)  -     metFORMIN ER (GLUCOPHAGE XR) 500 MG TABLET SR 24 HR; Take 2 Tablets by mouth every day.  -     Comp Metabolic Panel; Future    Overweight (BMI 25.0-29.9)                  Assessment & Plan  1. Overweight:  Chronic condition, unstable  She has experienced weight gain since starting Abilify in 2020 and has had difficulty losing weight. Metformin was previously tried but was not effective at a low dose of 500 mg daily. Extended-release metformin 500 mg twice daily will be prescribed to help manage weight and PCOS symptoms. Other weight loss medications such as phentermine (approved for 3 months), phentermine and topiramate combination, and naltrexone and bupropion (Contrave) were discussed. Injectable options like Wegovy and Zepbound were also mentioned.  She will check with her insurance regarding these medications.    She is advised to consult with her psychiatrist, Dr. Wright, regarding the suitability of these medications: Qysmia and Contrave.    2.  PCOS  Chronic condition, unstable, start metformin extended release.    Follow-up as needed.            Chief complaint::Diagnoses of PCOS (polycystic ovarian syndrome) and Overweight (BMI 25.0-29.9) were pertinent to this visit.      History of Present Illness  The patient is a 29-year-old female who presents here to discuss about weight loss medication.    She has previously tried metformin for weight loss, but found it ineffective and difficult to take due to its large size. She reports no issues with digestion or diarrhea. Despite maintaining a healthy diet and regular exercise, she has struggled with weight gain since starting Abilify in 2020. Her weight has fluctuated, but she has been unable to achieve significant  "weight loss. She is currently under the care of Dr. Wright for her mental health needs.      Review of Systems   Constitutional:  Negative for chills and fever.   Cardiovascular:  Negative for chest pain, palpitations and leg swelling.          Medications and Allergies:     Current Outpatient Medications   Medication Sig Dispense Refill    metFORMIN ER (GLUCOPHAGE XR) 500 MG TABLET SR 24 HR Take 2 Tablets by mouth every day. 180 Tablet 1    amphetamine-dextroamphetamine (ADDERALL XR) 15 MG XR capsule Take 1 Capsule by mouth every morning for 30 days. 30 Capsule 0    citalopram (CELEXA) 10 MG tablet Take 1 Tablet by mouth every morning. 90 Tablet 0    ARIPiprazole (ABILIFY) 10 MG Tab Take 1 Tablet by mouth at bedtime. 90 Tablet 0    LORYNA 3-0.02 MG per tablet Take 1 Tablet by mouth every day.       No current facility-administered medications for this visit.       /68   Pulse 73   Temp 36.6 °C (97.8 °F) (Temporal)   Ht 1.727 m (5' 8\")   Wt 86.6 kg (190 lb 14.7 oz)   SpO2 98% , Body mass index is 29.03 kg/m².      Physical Exam  Constitutional:       Appearance: Normal appearance. She is well-developed and well-groomed.   HENT:      Head: Normocephalic and atraumatic.      Right Ear: External ear normal.      Left Ear: External ear normal.   Eyes:      General:         Right eye: No discharge.         Left eye: No discharge.      Conjunctiva/sclera: Conjunctivae normal.   Cardiovascular:      Rate and Rhythm: Normal rate.   Pulmonary:      Effort: Pulmonary effort is normal. No respiratory distress.   Musculoskeletal:      Cervical back: Neck supple.   Skin:     Findings: No rash.   Neurological:      Mental Status: She is alert.   Psychiatric:         Mood and Affect: Mood and affect normal.         Behavior: Behavior normal.            I reviewed with patient lab results resulted on 7/9/2024        Please note that this dictation was created using voice recognition software. I have made every reasonable " attempt to correct obvious errors, but I expect that there are errors of grammar and possibly content that I did not discover before finalizing the note.

## 2024-09-19 ENCOUNTER — HOSPITAL ENCOUNTER (OUTPATIENT)
Dept: LAB | Facility: MEDICAL CENTER | Age: 29
End: 2024-09-19
Attending: FAMILY MEDICINE
Payer: OTHER GOVERNMENT

## 2024-09-19 DIAGNOSIS — E28.2 PCOS (POLYCYSTIC OVARIAN SYNDROME): ICD-10-CM

## 2024-09-19 PROCEDURE — 36415 COLL VENOUS BLD VENIPUNCTURE: CPT

## 2024-09-19 PROCEDURE — 80053 COMPREHEN METABOLIC PANEL: CPT

## 2024-09-21 LAB
ALBUMIN SERPL BCP-MCNC: 4.8 G/DL (ref 3.2–4.9)
ALBUMIN/GLOB SERPL: 1.7 G/DL
ALP SERPL-CCNC: 54 U/L (ref 30–99)
ALT SERPL-CCNC: 27 U/L (ref 2–50)
ANION GAP SERPL CALC-SCNC: 15 MMOL/L (ref 7–16)
AST SERPL-CCNC: 27 U/L (ref 12–45)
BILIRUB SERPL-MCNC: 0.6 MG/DL (ref 0.1–1.5)
BUN SERPL-MCNC: 10 MG/DL (ref 8–22)
CALCIUM ALBUM COR SERPL-MCNC: 9.3 MG/DL (ref 8.5–10.5)
CALCIUM SERPL-MCNC: 9.9 MG/DL (ref 8.5–10.5)
CHLORIDE SERPL-SCNC: 99 MMOL/L (ref 96–112)
CO2 SERPL-SCNC: 24 MMOL/L (ref 20–33)
CREAT SERPL-MCNC: 0.77 MG/DL (ref 0.5–1.4)
GFR SERPLBLD CREATININE-BSD FMLA CKD-EPI: 108 ML/MIN/1.73 M 2
GLOBULIN SER CALC-MCNC: 2.8 G/DL (ref 1.9–3.5)
GLUCOSE SERPL-MCNC: 79 MG/DL (ref 65–99)
POTASSIUM SERPL-SCNC: 4.4 MMOL/L (ref 3.6–5.5)
PROT SERPL-MCNC: 7.6 G/DL (ref 6–8.2)
SODIUM SERPL-SCNC: 138 MMOL/L (ref 135–145)

## 2024-10-15 ENCOUNTER — TELEMEDICINE (OUTPATIENT)
Dept: BEHAVIORAL HEALTH | Facility: CLINIC | Age: 29
End: 2024-10-15
Payer: OTHER GOVERNMENT

## 2024-10-15 DIAGNOSIS — F31.81 BIPOLAR 2 DISORDER (HCC): ICD-10-CM

## 2024-10-15 DIAGNOSIS — F90.2 ADHD (ATTENTION DEFICIT HYPERACTIVITY DISORDER), COMBINED TYPE: ICD-10-CM

## 2024-10-15 DIAGNOSIS — F41.1 GENERALIZED ANXIETY DISORDER: ICD-10-CM

## 2024-10-15 PROCEDURE — 90833 PSYTX W PT W E/M 30 MIN: CPT | Mod: 95 | Performed by: PSYCHIATRY & NEUROLOGY

## 2024-10-15 PROCEDURE — 99214 OFFICE O/P EST MOD 30 MIN: CPT | Mod: 95 | Performed by: PSYCHIATRY & NEUROLOGY

## 2024-10-15 RX ORDER — ARIPIPRAZOLE 10 MG/1
10 TABLET ORAL
Qty: 90 TABLET | Refills: 0 | Status: SHIPPED | OUTPATIENT
Start: 2024-10-15

## 2024-10-15 RX ORDER — DEXTROAMPHETAMINE SACCHARATE, AMPHETAMINE ASPARTATE MONOHYDRATE, DEXTROAMPHETAMINE SULFATE AND AMPHETAMINE SULFATE 3.75; 3.75; 3.75; 3.75 MG/1; MG/1; MG/1; MG/1
15 CAPSULE, EXTENDED RELEASE ORAL EVERY MORNING
Qty: 30 CAPSULE | Refills: 0 | Status: CANCELLED | OUTPATIENT
Start: 2024-12-26 | End: 2025-01-25

## 2024-10-15 RX ORDER — DEXTROAMPHETAMINE SACCHARATE, AMPHETAMINE ASPARTATE MONOHYDRATE, DEXTROAMPHETAMINE SULFATE AND AMPHETAMINE SULFATE 3.75; 3.75; 3.75; 3.75 MG/1; MG/1; MG/1; MG/1
15 CAPSULE, EXTENDED RELEASE ORAL EVERY MORNING
Qty: 30 CAPSULE | Refills: 0 | Status: CANCELLED | OUTPATIENT
Start: 2024-10-29 | End: 2024-11-28

## 2024-10-15 RX ORDER — DEXTROAMPHETAMINE SACCHARATE, AMPHETAMINE ASPARTATE MONOHYDRATE, DEXTROAMPHETAMINE SULFATE AND AMPHETAMINE SULFATE 7.5; 7.5; 7.5; 7.5 MG/1; MG/1; MG/1; MG/1
30 CAPSULE, EXTENDED RELEASE ORAL EVERY MORNING
Qty: 30 CAPSULE | Refills: 0 | Status: SHIPPED | OUTPATIENT
Start: 2024-10-15 | End: 2024-10-17 | Stop reason: SDUPTHER

## 2024-10-15 RX ORDER — DEXTROAMPHETAMINE SACCHARATE, AMPHETAMINE ASPARTATE MONOHYDRATE, DEXTROAMPHETAMINE SULFATE AND AMPHETAMINE SULFATE 7.5; 7.5; 7.5; 7.5 MG/1; MG/1; MG/1; MG/1
30 CAPSULE, EXTENDED RELEASE ORAL EVERY MORNING
Qty: 30 CAPSULE | Refills: 0 | Status: SHIPPED | OUTPATIENT
Start: 2024-11-13 | End: 2024-12-13

## 2024-10-15 RX ORDER — DEXTROAMPHETAMINE SACCHARATE, AMPHETAMINE ASPARTATE MONOHYDRATE, DEXTROAMPHETAMINE SULFATE AND AMPHETAMINE SULFATE 3.75; 3.75; 3.75; 3.75 MG/1; MG/1; MG/1; MG/1
15 CAPSULE, EXTENDED RELEASE ORAL EVERY MORNING
Qty: 30 CAPSULE | Refills: 0 | Status: CANCELLED | OUTPATIENT
Start: 2024-11-27 | End: 2024-12-27

## 2024-10-17 PROCEDURE — RXMED WILLOW AMBULATORY MEDICATION CHARGE: Performed by: PSYCHIATRY & NEUROLOGY

## 2024-10-18 ENCOUNTER — PHARMACY VISIT (OUTPATIENT)
Dept: PHARMACY | Facility: MEDICAL CENTER | Age: 29
End: 2024-10-18
Payer: COMMERCIAL

## 2024-10-22 ENCOUNTER — OFFICE VISIT (OUTPATIENT)
Dept: BEHAVIORAL HEALTH | Facility: CLINIC | Age: 29
End: 2024-10-22
Payer: OTHER GOVERNMENT

## 2024-10-22 DIAGNOSIS — F31.81 BIPOLAR 2 DISORDER (HCC): ICD-10-CM

## 2024-10-22 PROCEDURE — 90834 PSYTX W PT 45 MINUTES: CPT | Performed by: COUNSELOR

## 2024-11-12 ENCOUNTER — OFFICE VISIT (OUTPATIENT)
Dept: BEHAVIORAL HEALTH | Facility: CLINIC | Age: 29
End: 2024-11-12
Payer: OTHER GOVERNMENT

## 2024-11-12 DIAGNOSIS — F31.81 BIPOLAR 2 DISORDER (HCC): ICD-10-CM

## 2024-11-12 PROCEDURE — 90837 PSYTX W PT 60 MINUTES: CPT | Performed by: COUNSELOR

## 2024-11-12 NOTE — PROGRESS NOTES
"[facility]   Therapy Progress Note        Name: Edward Gan  MRN: [id number]  : 1995  Age: 29 y.o.  Date of assessment: 2024  PCP: Quintin Reagan M.D.  Persons in attendance: Patient  Total session time: 59 minutes      Topics addressed in psychotherapy include: Behavior: Patient reports improvements in her relationship with her , and her relationship with her sister is becoming stronger after not speaking for so long. Patient shares her experience in forgiving one of  her parents due to understanding how they were forced to navigate a racist world.   Interventions: Therapist actively listened and reflected back strengths present in patient's marriage. Therapist encouraged patient to schedule a conversation with her  every 3 months or so to really hear and listen what each of them is going through, check in about boundaries that may be need to be adjusted, and an overall check in with where their relationship is at to strengthen their already present strengths. Therapist reframed patient's \"new\" relationship with her sister as taking on new roles with clearer and healthier boundaries, potentially fostering a closer and healthier relationship.   Response: Patient was receptive to above interventions. Patient is agreeable to scheduling conversations with her  to check in about where they are and creating more dedicated space to discuss where they are at and what (if anything) could change to strengthen and deepen their relationship. Patient acknowledges that she has felt responsible for her siblings, and the way she navigated that responsibility was helpful, for a time, and patient expresses gratitude about her relationship with her sister and how it has transformed into a healthier bond.   Plan: Patient returns for next therapy session on 2024.     Objective Observations:   Participation:Active verbal participation, Attentive, Engaged, and Open to " feedback   Grooming:Good, Casual, and Neat   Cognition:Alert and Fully Oriented   Eye Contact:Good   Mood:Euthymic   Affect:Congruent with content, Bright, and Happy   Thought Process:Logical and Circumstantial   Speech:Rate within normal limits and Volume within normal limits    Current Risk:   Suicide: low   Homicide: low   Self-Harm: low   Relapse: low   Safety Plan Reviewed: not applicable    Care Plan Updated: No    Does patient express agreement with the above plan? Yes     Diagnosis:  1. Bipolar 2 disorder (HCC)        Referral appointment(s) scheduled? No       CHETNA Chaudhry.

## 2024-12-05 PROCEDURE — RXMED WILLOW AMBULATORY MEDICATION CHARGE: Performed by: PSYCHIATRY & NEUROLOGY

## 2024-12-06 ENCOUNTER — PHARMACY VISIT (OUTPATIENT)
Dept: PHARMACY | Facility: MEDICAL CENTER | Age: 29
End: 2024-12-06
Payer: COMMERCIAL

## 2024-12-13 ENCOUNTER — OFFICE VISIT (OUTPATIENT)
Dept: BEHAVIORAL HEALTH | Facility: CLINIC | Age: 29
End: 2024-12-13
Payer: OTHER GOVERNMENT

## 2024-12-13 ENCOUNTER — TELEMEDICINE (OUTPATIENT)
Dept: BEHAVIORAL HEALTH | Facility: CLINIC | Age: 29
End: 2024-12-13
Payer: OTHER GOVERNMENT

## 2024-12-13 DIAGNOSIS — F41.1 GENERALIZED ANXIETY DISORDER: ICD-10-CM

## 2024-12-13 DIAGNOSIS — F31.81 BIPOLAR 2 DISORDER (HCC): ICD-10-CM

## 2024-12-13 DIAGNOSIS — F90.2 ADHD (ATTENTION DEFICIT HYPERACTIVITY DISORDER), COMBINED TYPE: ICD-10-CM

## 2024-12-13 PROCEDURE — 99214 OFFICE O/P EST MOD 30 MIN: CPT | Mod: 95 | Performed by: PSYCHIATRY & NEUROLOGY

## 2024-12-13 PROCEDURE — 90834 PSYTX W PT 45 MINUTES: CPT | Performed by: COUNSELOR

## 2024-12-13 RX ORDER — DEXTROAMPHETAMINE SACCHARATE, AMPHETAMINE ASPARTATE MONOHYDRATE, DEXTROAMPHETAMINE SULFATE AND AMPHETAMINE SULFATE 7.5; 7.5; 7.5; 7.5 MG/1; MG/1; MG/1; MG/1
30 CAPSULE, EXTENDED RELEASE ORAL EVERY MORNING
Qty: 30 CAPSULE | Refills: 0 | Status: SHIPPED | OUTPATIENT
Start: 2025-01-04 | End: 2025-02-03

## 2024-12-13 RX ORDER — DEXTROAMPHETAMINE SACCHARATE, AMPHETAMINE ASPARTATE MONOHYDRATE, DEXTROAMPHETAMINE SULFATE AND AMPHETAMINE SULFATE 7.5; 7.5; 7.5; 7.5 MG/1; MG/1; MG/1; MG/1
30 CAPSULE, EXTENDED RELEASE ORAL EVERY MORNING
Qty: 30 CAPSULE | Refills: 0 | Status: SHIPPED | OUTPATIENT
Start: 2025-03-03 | End: 2025-04-02

## 2024-12-13 RX ORDER — DEXTROAMPHETAMINE SACCHARATE, AMPHETAMINE ASPARTATE MONOHYDRATE, DEXTROAMPHETAMINE SULFATE AND AMPHETAMINE SULFATE 7.5; 7.5; 7.5; 7.5 MG/1; MG/1; MG/1; MG/1
30 CAPSULE, EXTENDED RELEASE ORAL EVERY MORNING
Qty: 30 CAPSULE | Refills: 0 | Status: SHIPPED | OUTPATIENT
Start: 2025-02-02 | End: 2025-03-04

## 2024-12-13 NOTE — PROGRESS NOTES
PSYCHIATRY VIRTUAL VISIT FOLLOW-UP NOTE    Chief Complaint   Patient presents with    Follow-Up     Mood, anxiety, ADHD     This evaluation was conducted via Teams using secure and encrypted videoconferencing technology.   The patient was in their home in the [facility].    The patient's identity was confirmed and verbal consent was obtained for this virtual visit.     History Of Present Illness:  Edward Gan is a 29 y.o. female with bipolar 2 mood disorder, generalized anxiety disorder, PTSD, ADHD comes in today for follow up, was last seen 2 months ago.  She has noticed improvements in her ADHD symptoms with higher Adderall dose.  She is able to focus for a lot longer throughout the day and is staying on top of things.  She has noticed improvements in her energy as well.  She has been staying in touch with her sister and that relationship has really improved.  She mentioned that she and her  are currently doing good in regards to their relationship.  She has noticed decreased appetite as a side effect from Adderall but is trying to remind herself to eat on a regular basis.  She denies any worsening anxiety, insomnia, irritability, racing thoughts, reckless or impulsive behaviors with increased dose of Adderall.  She denies any current struggles with depression.  She denies having thoughts of wanting to hurt herself.    Social History:   She is  and  x 1, lives with  and adult step son (with intellectual disability) in [place], step daughter lives in [place], Bachelor's degree in History, deferred law school admission to 2025, mother and 2 younger siblings live in [place], father lives in [place], she and  own financial planning business.     Substance Use:  Alcohol - Infrequent use  Nicotine - Denies   Cannabis - Denies  Illicit drugs - Denies    Past Medication Trials:  Zoloft, Risperdal IM, Ativan (effective)    Psychological testing with Dr. Emilia Park,  [facility] (11/3/2021): mild ADHD, combined type    Medications:  Current Outpatient Medications   Medication Sig Dispense Refill    SEMAGLUTIDE-WEIGHT MANAGEMENT SC Inject  under the skin.      [START ON 1/4/2025] amphetamine-dextroamphetamine ER (ADDERALL XR) 30 MG XR capsule Take 1 Capsule by mouth every morning for 30 days. 30 Capsule 0    [START ON 2/2/2025] amphetamine-dextroamphetamine ER (ADDERALL XR) 30 MG XR capsule Take 1 Capsule by mouth every morning for 30 days. 30 Capsule 0    [START ON 3/3/2025] amphetamine-dextroamphetamine ER (ADDERALL XR) 30 MG XR capsule Take 1 Capsule by mouth every morning for 30 days. 30 Capsule 0    amphetamine-dextroamphetamine ER (ADDERALL XR) 30 MG XR capsule Take 1 Capsule by mouth every morning for 30 days. 30 Capsule 0    ARIPiprazole (ABILIFY) 10 MG Tab Take 1 Tablet by mouth at bedtime. 90 Tablet 0    citalopram (CELEXA) 10 MG tablet Take 1 Tablet by mouth every morning. 90 Tablet 0    LORYNA 3-0.02 MG per tablet Take 1 Tablet by mouth every day.      metFORMIN ER (GLUCOPHAGE XR) 500 MG TABLET SR 24 HR Take 2 Tablets by mouth every day. (Patient not taking: Reported on 12/13/2024) 180 Tablet 1     No current facility-administered medications for this visit.     Review Of Systems:    Psychiatric - Positive for infrequent anxiety, depression    Physical Examination:  Vital signs: There were no vitals taken for this visit.    Musculoskeletal: No abnormal movements.     Mental Status Evaluation:   General: Young female, dressed in casual attire, good grooming and hygiene, in no apparent distress, calm and cooperative, good eye contact, no psychomotor agitation or retardation  Orientation: Alert and oriented to person, place and time  Recent and remote memory: Grossly intact  Attention span and concentration: Grossly intact  Speech: Spontaneous, normal rate, rhythm and tone  Thought Process: Linear, logical and goal directed  Thought Content: Denies suicidal or homicidal  "ideations, intent or plan  Perception: No delusions noted  Associations: Intact  Language: Appropriate  Fund of knowledge and vocabulary: Grossly adequate  Mood: \"alright\"  Affect: Euthymic, mood congruent  Insight: Good  Judgment: Good    Depression screenin/15/2020     8:30 AM 2022    12:00 PM 2024     1:30 PM   Depression Screen (PHQ-2/PHQ-9)   PHQ-2 Total Score 1 0 0   PHQ-9 Total Score 13       Interpretation of PHQ-9 Total Score    Score Severity   1-4 No Depression   5-9 Mild Depression   10-14 Moderate Depression   15-19 Moderately Severe Depression   20-27 Severe Depression    Medical Records/Labs/Diagnostic Tests Reviewed:  [place] PDMP records - appropriate refills    Impression:  1.  Bipolar 2 mood disorder - stable   2.  Generalized anxiety disorder - stable    3.  ADHD, combined type - stable     Plan:  1.  Continue Celexa 10 mg in the morning for anxiety and depression  2.  Continue Abilify 10 mg at bedtime for mood stabilization  -Metabolic monitoring (2024): Lipid profile normal, A1c normal  -Yearly metabolic monitoring labs due in 2025  3.  Continue Adderall XR 30 mg in the morning for ADHD.  E-prescribed x 3 months.  She is aware of the medication shortage.  4.  Continue Hydroxyzine 25 mg daily as needed for anxiety  5.  Continue individual psychotherapy with GIULIANA Chaudhry    Return to clinic in 3-4 months or sooner if symptoms worsen    The proposed treatment plan was discussed with the patient who was provided the opportunity to ask questions and make suggestions regarding alternative treatment. Patient verbalized understanding and expressed agreement with the plan.     Sumaya Wright M.D.  24    This note was created using voice recognition software (Dragon). The accuracy of the dictation is limited by the abilities of the software. I have reviewed the note prior to signing, however some errors in grammar and context are still possible. If you have any questions " related to this note please do not hesitate to contact our office.

## 2024-12-13 NOTE — PROGRESS NOTES
[facility]   Therapy Progress Note        Name: Edward Gan  MRN: [id number]  : 1995  Age: 29 y.o.  Date of assessment: 2024  PCP: Quintin Reagan M.D.  Persons in attendance: Patient  Total session time: 46 minutes      Topics addressed in psychotherapy include: Behavior: Patient shares that she and her  had a difficult conversation recently. Patient expressed gratitude in being able to have the conversation, while also acknowledging residual feelings from it. Patient shares that overall she has been doing well, her business continues to grow, and her relationship with her sister continues to thrive.   Interventions: Therapist actively listened and reflected back themes present in patient's conversation with her . Therapist supported patient in identifying communication skills to use in future conversations with her . Therapist reflected back patient's current strengths in life and reflected on when patient began therapy.   Response: Patient was receptive to above interventions. Patient acknowledged that the themes present and shares that she and her  have agreed to have ongoing conversations about similar topics. Patient is able to recognize her own growth and how far she has come and is motivated to continuing to grow and effectively manage her mental health symptoms.   Plan: Patient returns for next therapy session on 2025    Objective Observations:   Participation:Active verbal participation, Attentive, Engaged, and Open to feedback   Grooming:Good, Casual, and Neat   Cognition:Alert and Fully Oriented   Eye Contact:Good   Mood:Euthymic   Affect:Congruent with content, Sad, and Tearful   Thought Process:Logical and Circumstantial   Speech:Rate within normal limits and Volume within normal limits    Current Risk:   Suicide: low   Homicide: low   Self-Harm: low   Relapse: low   Safety Plan Reviewed: not applicable    Care Plan Updated:  No    Does patient express agreement with the above plan? Yes     Diagnosis:  1. Bipolar 2 disorder (HCC)        Referral appointment(s) scheduled? No       CHETNA Chaudhry.

## 2025-01-06 ENCOUNTER — OFFICE VISIT (OUTPATIENT)
Dept: BEHAVIORAL HEALTH | Facility: CLINIC | Age: 30
End: 2025-01-06
Payer: OTHER GOVERNMENT

## 2025-01-06 DIAGNOSIS — F31.81 BIPOLAR 2 DISORDER (HCC): ICD-10-CM

## 2025-01-06 PROCEDURE — 90834 PSYTX W PT 45 MINUTES: CPT | Performed by: COUNSELOR

## 2025-01-06 ASSESSMENT — PATIENT HEALTH QUESTIONNAIRE - PHQ9
5. POOR APPETITE OR OVEREATING: 0 - NOT AT ALL
SUM OF ALL RESPONSES TO PHQ QUESTIONS 1-9: 1
CLINICAL INTERPRETATION OF PHQ2 SCORE: 0

## 2025-01-06 ASSESSMENT — ANXIETY QUESTIONNAIRES
2. NOT BEING ABLE TO STOP OR CONTROL WORRYING: NOT AT ALL
6. BECOMING EASILY ANNOYED OR IRRITABLE: NOT AT ALL
5. BEING SO RESTLESS THAT IT IS HARD TO SIT STILL: NOT AT ALL
3. WORRYING TOO MUCH ABOUT DIFFERENT THINGS: SEVERAL DAYS
7. FEELING AFRAID AS IF SOMETHING AWFUL MIGHT HAPPEN: NOT AT ALL
1. FEELING NERVOUS, ANXIOUS, OR ON EDGE: SEVERAL DAYS
4. TROUBLE RELAXING: SEVERAL DAYS
GAD7 TOTAL SCORE: 3

## 2025-01-06 NOTE — PROGRESS NOTES
[facility]   Therapy Progress Note        Name: Edward Gan  MRN: [id number]  : 1995  Age: 29 y.o.  Date of assessment: 2025  PCP: Quintin Reagan M.D.  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 Patient expanded on her experience being manic and what global issues impacted her the most during that time. Patient expanded on her relationship with her  and how she feels secure enough for now, and is unsure of what their future looks like.   Interventions: Therapist actively listened and reflected back emotions. Therapist provided psycho-education on anthony and reflected back patient's strengths in being able to effectively manage her bipolar disorder. Therapist validated patient's emotions and reframed patient's experience to allow for more compassion.   Response: Patient was receptive to above interventions. Patient expressed gratitude for her psychiatrist and luck with medications and being able to effectively manage her bipolar disorder for so long. Patient was able to give herself more compassion.   Plan: Patient returns for next therapy session on 2025.    Objective Observations:   Participation:Active verbal participation, Attentive, Engaged, and Open to feedback   Grooming:Good, Casual, and Neat   Cognition:Alert and Fully Oriented   Eye Contact:Good   Mood:Euthymic   Affect:Congruent with content and Tearful   Thought Process:Logical and Circumstantial   Speech:Rate within normal limits and Volume within normal limits    Current Risk:   Suicide: low   Homicide: low   Self-Harm: low   Relapse: low   Safety Plan Reviewed: not applicable    Care Plan Updated: No    Does patient express agreement with the above plan? Yes     Diagnosis:  1. Bipolar 2 disorder (HCC)         Referral appointment(s) scheduled? No       CHETNA Chaudhry.

## 2025-01-08 ENCOUNTER — PHARMACY VISIT (OUTPATIENT)
Dept: PHARMACY | Facility: MEDICAL CENTER | Age: 30
End: 2025-01-08
Payer: COMMERCIAL

## 2025-01-08 PROCEDURE — RXMED WILLOW AMBULATORY MEDICATION CHARGE: Performed by: PSYCHIATRY & NEUROLOGY

## 2025-01-31 ENCOUNTER — APPOINTMENT (OUTPATIENT)
Dept: BEHAVIORAL HEALTH | Facility: CLINIC | Age: 30
End: 2025-01-31
Payer: OTHER GOVERNMENT

## 2025-01-31 DIAGNOSIS — F31.81 BIPOLAR 2 DISORDER (HCC): ICD-10-CM

## 2025-01-31 PROCEDURE — 90834 PSYTX W PT 45 MINUTES: CPT | Performed by: COUNSELOR

## 2025-01-31 NOTE — PROGRESS NOTES
"[facility]   Therapy Progress Note        Name: Edward Gan  MRN: [id number]  : 1995  Age: 29 y.o.  Date of assessment: 2025  PCP: Quintin Reagan M.D.  Persons in attendance: Patient  Total session time: 50 minutes      Topics addressed in psychotherapy include: Behavior: Patient reports some struggles in her marriage, leaving patient feeling sad and confused. Patient expresses happiness in her growing relationship with her sister, and how she is supporting her sister through some pretty exciting. 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   Interventions: Therapist actively listened and validated patient's emotions. Therapist reflected back strengths in her relationship with her sister and supported patient in developing communication skills to continue strengthening her relationship with her sister. Therapist validated patient's autonomy in regards to her marriage and invited patient to share more when she feels ready; therapist maintained a \"lighter,\" session to increase patient's sense of safety.   Response: Patient was receptive to above interventions. Patient acknowledges how much her relationship with her sister has grown and is excited to continue getting closer with her and having healthy boundaries. Patient acknowledges she will talk about her marriage when it feels more appropriate, and was receptive to a lighter session and expanded on her friendships and growth.   Plan: Patient returns for next therapy session on 2025.     Objective Observations:   Participation:Active verbal participation, Attentive, Engaged, and Open to feedback   Grooming:Good, Casual, and Neat   Cognition:Alert and Fully Oriented   Eye Contact:Good   Mood:Euthymic   Affect:Congruent with content and Sad   Thought Process:Logical and Circumstantial   Speech:Rate within normal limits and Volume within normal " limits    Current Risk:   Suicide: low   Homicide: low   Self-Harm: low   Relapse: low   Safety Plan Reviewed: not applicable    Care Plan Updated: No    Does patient express agreement with the above plan? Yes     Diagnosis:  1. Bipolar 2 disorder (HCC)        Referral appointment(s) scheduled? No       CHETNA Chaudhry.

## 2025-02-12 PROCEDURE — RXMED WILLOW AMBULATORY MEDICATION CHARGE: Performed by: PSYCHIATRY & NEUROLOGY

## 2025-02-14 ENCOUNTER — PHARMACY VISIT (OUTPATIENT)
Dept: PHARMACY | Facility: MEDICAL CENTER | Age: 30
End: 2025-02-14
Payer: COMMERCIAL

## 2025-02-18 ENCOUNTER — OFFICE VISIT (OUTPATIENT)
Dept: BEHAVIORAL HEALTH | Facility: CLINIC | Age: 30
End: 2025-02-18
Payer: OTHER GOVERNMENT

## 2025-02-18 DIAGNOSIS — F31.81 BIPOLAR 2 DISORDER (HCC): ICD-10-CM

## 2025-02-18 NOTE — PROGRESS NOTES
[facility]   Therapy Progress Note        Name: Edward Gan  MRN: [id number]  : 1995  Age: 29 y.o.  Date of assessment: 2025  PCP: Quintin Reagan M.D.  Persons in attendance: Patient  Total session time: 44 minutes      Topics addressed in psychotherapy include: Behavior: Patient reports that she continues to feel stress in her marriage, while also acknowledging that she does feel seen and validated by her . Patient shares some of the things about her childhood that upset her, particularly since she is an adult and has a new perspective. Patient acknowledges forgiving one parent more than another and how she was able to get to a place of forgiveness.   Interventions: Therapist actively listened and validated patient's emotions and reflected back emotions. Therapist supported patient in processing emotions related to her childhood. Therapist reflected back themes of racism  that patient's parents both had to navigate, making it easier to find forgiveness, and invited patient to share her thoughts.   Response: Patient was receptive to above interventions. Patient was able to process emotions related to her childhood while also sharing how surprising it is that she continues to feel sad after doing so much work. Patient acknowledges how racism showed up in her parents' lives and did make it more possible for her to be able to forgive certain parts of her parents' parenting.   Plan: Patient returns for next therapy session on 3/11/2025.     Objective Observations:   Participation:Active verbal participation, Attentive, Engaged, and Open to feedback   Grooming:Good, Casual, and Neat   Cognition:Alert and Fully Oriented   Eye Contact:Good   Mood:Euthymic   Affect:Congruent with content, Sad, and Tearful   Thought Process:Logical and Circumstantial   Speech:Rate within normal limits and Volume within normal limits    Current Risk:   Suicide: low   Homicide: low   Self-Harm:  low   Relapse: low   Safety Plan Reviewed: not applicable    Care Plan Updated: No    Does patient express agreement with the above plan? Yes     Diagnosis:  1. Bipolar 2 disorder (HCC)        Referral appointment(s) scheduled? No       CHETNA Chaudhry.

## 2025-03-11 ENCOUNTER — OFFICE VISIT (OUTPATIENT)
Dept: BEHAVIORAL HEALTH | Facility: CLINIC | Age: 30
End: 2025-03-11
Payer: OTHER GOVERNMENT

## 2025-03-11 DIAGNOSIS — F31.81 BIPOLAR 2 DISORDER (HCC): ICD-10-CM

## 2025-03-11 PROCEDURE — 90834 PSYTX W PT 45 MINUTES: CPT | Performed by: COUNSELOR

## 2025-03-11 NOTE — PROGRESS NOTES
[facility]   Therapy Progress Note        Name: Edward Gan  MRN: [id number]  : 1995  Age: 29 y.o.  Date of assessment: 3/11/2025  PCP: Quintin Reagan M.D.  Persons in attendance: Patient  Total session time: 40 minutes      Topics addressed in psychotherapy include: Patient was about 10 minutes late to session today.   Behavior: Patient reports that she did not have a good trip to [place] and described events  that took place, impacting patient's marriage and what she wants from her marriage; patient expresses sadness about the direction her marriage is currently heading. Patient shares boundaries that have been violated, and how she anticipates a divorce/what that divorce will look like. Patient shares that she feels as though she has been punished for her manic episode(s) more than 2 years ago.   Interventions: Therapist actively listened and reflected back emotions, and validated patient's emotions. Therapist reflected back themes present and reframed patient's view of her manic episode(s). Therapist encouraged patient to increase self-care and healthy lifestyle habits, and to refrain from going too deep into what a divorce will look like to decrease anxiety and likelihood of another manic episode.   Response: Patient was receptive to above interventions. Patient acknowledges that her manic episode was an unfortunate experience for patient, and acknowledges that it was somewhat out of her control and she continues to stay committed to her lifestyle and medication regiment. Patient is motivated to increase self-care and healthy lifestyle habits.   Plan: Patient returns for next therapy session on 2025.     Objective Observations:   Participation:Active verbal participation, Attentive, Engaged, and Open to feedback   Grooming:Good, Casual, and Neat   Cognition:Alert and Fully Oriented   Eye Contact:Good   Mood:Depressed   Affect:Congruent with content, Sad, and Tearful   Thought  Process:Logical and Circumstantial   Speech:Rate within normal limits and Volume within normal limits    Current Risk:   Suicide: low   Homicide: low   Self-Harm: low   Relapse: low   Safety Plan Reviewed: not applicable    Care Plan Updated: No    Does patient express agreement with the above plan? Yes     Diagnosis:  1. Bipolar 2 disorder (HCC)        Referral appointment(s) scheduled? No       CHETNA Chaudhry.

## 2025-03-19 ENCOUNTER — PHARMACY VISIT (OUTPATIENT)
Dept: PHARMACY | Facility: MEDICAL CENTER | Age: 30
End: 2025-03-19
Payer: COMMERCIAL

## 2025-03-19 PROCEDURE — RXMED WILLOW AMBULATORY MEDICATION CHARGE: Performed by: PSYCHIATRY & NEUROLOGY

## 2025-04-23 ENCOUNTER — OFFICE VISIT (OUTPATIENT)
Dept: BEHAVIORAL HEALTH | Facility: CLINIC | Age: 30
End: 2025-04-23
Payer: OTHER GOVERNMENT

## 2025-04-23 DIAGNOSIS — F31.81 BIPOLAR 2 DISORDER (HCC): ICD-10-CM

## 2025-04-23 PROCEDURE — 90834 PSYTX W PT 45 MINUTES: CPT | Performed by: COUNSELOR

## 2025-04-23 NOTE — PROGRESS NOTES
"[facility]   Therapy Progress Note        Name: Edward Gan  MRN: [id number]  : 1995  Age: 29 y.o.  Date of assessment: 2025  PCP: Quintin Reagan M.D.  Persons in attendance: Patient  Total session time: 50 minutes      Topics addressed in psychotherapy include: Behavior: Patient reports that her marital satisfaction has been improving and patient describes some of the conflict and how she and her  have resolved their conflict. Patient reports an increase in feelings of happiness and ease in her marriage, while acknowledging some of the areas that continue to need \"some work.\" Patient shares her recent visit with her siblings, highlighting some family dynamics that patient is aware of that other members of her family may not always see.   Interventions: Therapist actively listened and reflected back patient's brighter affect. Therapist reflected back marriage strengths and encouraged patient to set and enforce healthy boundaries that may have not been needed before. Therapist provided psycho-education on family dynamics and reflected back patient's healthy skills in navigating the complexity of her family of origin dynamics.   Response: Patient was receptive to above interventions, and acknowledges feeling much better and more at ease. Patient recognizes their strengths and expresses relief and confidence in their ability to continue navigating their issues. Patient acknowledges her strengths and expanded on her family dynamics.   Plan: Patient returns for next therapy session on 2025.     Objective Observations:   Participation:Active verbal participation, Attentive, Engaged, and Open to feedback   Grooming:Good, Casual, and Neat   Cognition:Alert and Fully Oriented   Eye Contact:Good   Mood:Euthymic and Happy   Affect:Congruent with content, Bright, and Happy   Thought Process:Logical and Circumstantial   Speech:Rate within normal limits and Volume within normal " limits    Current Risk:   Suicide: low   Homicide: low   Self-Harm: low   Relapse: low   Safety Plan Reviewed: not applicable    Care Plan Updated: No    Does patient express agreement with the above plan? Yes     Diagnosis:  1. Bipolar 2 disorder (HCC)        Referral appointment(s) scheduled? No       CHETNA Chaudhry.

## 2025-04-24 ENCOUNTER — TELEMEDICINE (OUTPATIENT)
Dept: BEHAVIORAL HEALTH | Facility: CLINIC | Age: 30
End: 2025-04-24
Payer: OTHER GOVERNMENT

## 2025-04-24 DIAGNOSIS — F90.2 ADHD (ATTENTION DEFICIT HYPERACTIVITY DISORDER), COMBINED TYPE: ICD-10-CM

## 2025-04-24 DIAGNOSIS — F31.81 BIPOLAR 2 DISORDER (HCC): ICD-10-CM

## 2025-04-24 DIAGNOSIS — F41.1 GENERALIZED ANXIETY DISORDER: ICD-10-CM

## 2025-04-24 PROCEDURE — 99214 OFFICE O/P EST MOD 30 MIN: CPT | Mod: 95 | Performed by: PSYCHIATRY & NEUROLOGY

## 2025-04-24 RX ORDER — DEXTROAMPHETAMINE SACCHARATE, AMPHETAMINE ASPARTATE MONOHYDRATE, DEXTROAMPHETAMINE SULFATE AND AMPHETAMINE SULFATE 7.5; 7.5; 7.5; 7.5 MG/1; MG/1; MG/1; MG/1
30 CAPSULE, EXTENDED RELEASE ORAL EVERY MORNING
Qty: 30 CAPSULE | Refills: 0 | Status: SHIPPED | OUTPATIENT
Start: 2025-05-23 | End: 2025-06-22

## 2025-04-24 RX ORDER — DEXTROAMPHETAMINE SACCHARATE, AMPHETAMINE ASPARTATE MONOHYDRATE, DEXTROAMPHETAMINE SULFATE AND AMPHETAMINE SULFATE 7.5; 7.5; 7.5; 7.5 MG/1; MG/1; MG/1; MG/1
30 CAPSULE, EXTENDED RELEASE ORAL EVERY MORNING
Qty: 30 CAPSULE | Refills: 0 | Status: SHIPPED | OUTPATIENT
Start: 2025-04-24 | End: 2025-05-24

## 2025-04-24 RX ORDER — DEXTROAMPHETAMINE SACCHARATE, AMPHETAMINE ASPARTATE MONOHYDRATE, DEXTROAMPHETAMINE SULFATE AND AMPHETAMINE SULFATE 7.5; 7.5; 7.5; 7.5 MG/1; MG/1; MG/1; MG/1
30 CAPSULE, EXTENDED RELEASE ORAL EVERY MORNING
Qty: 30 CAPSULE | Refills: 0 | Status: SHIPPED | OUTPATIENT
Start: 2025-06-21 | End: 2025-07-21

## 2025-04-24 NOTE — PROGRESS NOTES
PSYCHIATRY VIRTUAL VISIT FOLLOW-UP NOTE    Chief Complaint   Patient presents with    Follow-Up     Mood, anxiety, ADHD     This evaluation was conducted via Teams using secure and encrypted videoconferencing technology.   The patient was in a private location outside of their home in the [facility].    The patient's identity was confirmed and verbal consent was obtained for this virtual visit.     History Of Present Illness:  Edward Gan is a 29 y.o. female with bipolar 2 mood disorder, generalized anxiety disorder, PTSD, ADHD comes in today for follow up, was last seen over 4 months ago.  She has been doing good in regards to her mental health.  She has been keeping herself busy with work which seems to be going well.  She and her  are back together and are continuing to work on their marriage by working on communication and meeting each other's emotional needs.  She has been adequately taking care of herself.  She reports compliance with medications but my medical assistant checked with her pharmacy and there appears to be compliance issues with both Celexa and Abilify.  She did mention that she has partial compliance with Celexa as she does not think that she needs it anymore but does report compliance with Abilify and Adderall.  She is doing good in regards to her focus and concentration.  She denies any overwhelming struggles with mood symptoms.  She denies having thoughts of wanting to hurt herself.    Social History:   She is , lives with  and adult step son (has intellectual disability) in [place], step daughter lives in [place], Bachelor's degree in History, deferred law school admission to 2025, mother and 2 younger siblings live in [place], brother is in halfway in [place] until July 2025, father lives in [place], she and  own financial planning business.     Substance Use:  Alcohol - Infrequent use  Nicotine - Denies   Cannabis - Denies  Illicit drugs -  Denies    Past Medication Trials:  Zoloft, Risperdal IM, Ativan (effective)    Psychological testing with Dr. Emilia Park, Briany.D (11/3/2021): mild ADHD, combined type    Medications:  Current Outpatient Medications   Medication Sig Dispense Refill    amphetamine-dextroamphetamine ER (ADDERALL XR) 30 MG XR capsule Take 1 Capsule by mouth every morning for 30 days. 30 Capsule 0    [START ON 5/23/2025] amphetamine-dextroamphetamine ER (ADDERALL XR) 30 MG XR capsule Take 1 Capsule by mouth every morning for 30 days. 30 Capsule 0    [START ON 6/21/2025] amphetamine-dextroamphetamine ER (ADDERALL XR) 30 MG XR capsule Take 1 Capsule by mouth every morning for 30 days. 30 Capsule 0    SEMAGLUTIDE-WEIGHT MANAGEMENT SC Inject  under the skin.      ARIPiprazole (ABILIFY) 10 MG Tab Take 1 Tablet by mouth at bedtime. 90 Tablet 0    LORYNA 3-0.02 MG per tablet Take 1 Tablet by mouth every day.      metFORMIN ER (GLUCOPHAGE XR) 500 MG TABLET SR 24 HR Take 2 Tablets by mouth every day. (Patient not taking: Reported on 4/24/2025) 180 Tablet 1     No current facility-administered medications for this visit.     Review Of Systems:    Psychiatric - Positive for infrequent anxiety, depression    Physical Examination:  Vital signs: There were no vitals taken for this visit.    Musculoskeletal: No abnormal movements.     Mental Status Evaluation:   General: Young female, dressed in casual attire, good grooming and hygiene, in no apparent distress, calm and cooperative, good eye contact, no psychomotor agitation or retardation  Orientation: Alert and oriented to person, place and time  Recent and remote memory: Grossly intact  Attention span and concentration: Grossly intact  Speech: Spontaneous, normal rate, rhythm and tone  Thought Process: Linear, logical and goal directed  Thought Content: Denies suicidal or homicidal ideations, intent or plan  Perception: No delusions noted  Associations: Intact  Language: Appropriate  Fund of  "knowledge and vocabulary: Grossly adequate  Mood: \"alright\"  Affect: Euthymic, mood congruent  Insight: Good  Judgment: Good    Depression screenin/25/2022    12:00 PM 2024     1:30 PM 2025     1:30 PM   Depression Screen (PHQ-2/PHQ-9)   PHQ-2 Total Score 0 0 0   PHQ-9 Total Score   1     Interpretation of PHQ-9 Total Score    Score Severity   1-4 No Depression   5-9 Mild Depression   10-14 Moderate Depression   15-19 Moderately Severe Depression   20-27 Severe Depression    Medical Records/Labs/Diagnostic Tests Reviewed:  [place] PDMP records - appropriate refills    Impression:  1.  Bipolar 2 mood disorder - stable   2.  Generalized anxiety disorder - stable    3.  ADHD, combined type - stable     Plan:  1.  Discontinue Celexa as patient reports poor compliance and is managing anxiety and depression well without it  2.  Continue Abilify 10 mg at bedtime for mood stabilization  -Metabolic monitoring (2024): Lipid profile normal, A1c normal  -Yearly metabolic monitoring labs due in 2025  3.  Continue Adderall XR 30 mg in the morning for ADHD.  E-prescribed x 3 months.  She is aware of the medication shortage.  4.  Continue Hydroxyzine 25 mg daily as needed for anxiety  5.  Continue individual psychotherapy with GIULIANA Chaudhry    Return to clinic in 3 months or sooner if symptoms worsen    The proposed treatment plan was discussed with the patient who was provided the opportunity to ask questions and make suggestions regarding alternative treatment. Patient verbalized understanding and expressed agreement with the plan.     Sumaya Wright M.D.  25    This note was created using voice recognition software (Dragon). The accuracy of the dictation is limited by the abilities of the software. I have reviewed the note prior to signing, however some errors in grammar and context are still possible. If you have any questions related to this note please do not hesitate to contact our office.   "

## 2025-05-06 ENCOUNTER — OFFICE VISIT (OUTPATIENT)
Dept: BEHAVIORAL HEALTH | Facility: CLINIC | Age: 30
End: 2025-05-06
Payer: OTHER GOVERNMENT

## 2025-05-06 DIAGNOSIS — F31.81 BIPOLAR 2 DISORDER (HCC): ICD-10-CM

## 2025-05-06 PROCEDURE — 90834 PSYTX W PT 45 MINUTES: CPT | Performed by: COUNSELOR

## 2025-05-06 NOTE — PROGRESS NOTES
"[facility]   Therapy Progress Note        Name: Edward Gan  MRN: [id number]  : 1995  Age: 29 y.o.  Date of assessment: 2025  PCP: Quintin Reagan M.D.  Persons in attendance: Patient  Total session time: 52 minutes      Topics addressed in psychotherapy include: Behavior: Patient shares that she has been wanting to discuss her procrastination in therapy as her procrastination has been having consequences at her job. Patient shares examples of her procrastination and how it shows up at her job. Patient acknowledges not wanting to disappoint others/be a disappointment, while also struggling to come up with ways to decrease her procrastination.   Interventions: Therapist actively listened and validated patient's emotions and experience. Therapist engaged patient in a conversation regarding her history with procrastination and reflected back themes/narratives that patient has had around being a disappointment. Therapist reframed patient's procrastination as being her warning that she is stressed and needs extra help/delegate more work and invited patient's feedback. Therapist reflected back patient's strengths and accomplishments and supported patient in deconstructing unhelpful narratives about patient being a disappointment.   Response: Patient was receptive to above interventions. Patient shared her history with procrastination, acknowledging that she tends to procrastinate during times of high stress and will often \"do it all,\" on her own. Patient acknowledged that her procrastination likely is her \"warning,\" that she is stressed and needs extra support and shares her plan to delegate more and work on ways to empower herself more. Patient acknowledged how difficult college was for her and was able to deconstruct unhelpful narratives and replace them with more compassion.  Plan: Patient returns for next therapy session on 2025.     Objective " Observations:   Participation:Active verbal participation, Attentive, Engaged, and Open to feedback   Grooming:Good, Casual, and Neat   Cognition:Alert and Fully Oriented   Eye Contact:Good   Mood:Euthymic   Affect:Congruent with content, Sad, and Tearful   Thought Process:Logical and Circumstantial   Speech:Rate within normal limits and Volume within normal limits    Current Risk:   Suicide: low   Homicide: low   Self-Harm: low   Relapse: low   Safety Plan Reviewed: not applicable    Care Plan Updated: No    Does patient express agreement with the above plan? Yes     Diagnosis:  1. Bipolar 2 disorder (HCC)        Referral appointment(s) scheduled? No       ALMA Chaudhry

## 2025-05-14 ENCOUNTER — RESULTS FOLLOW-UP (OUTPATIENT)
Dept: MEDICAL GROUP | Facility: MEDICAL CENTER | Age: 30
End: 2025-05-14

## 2025-05-14 ENCOUNTER — HOSPITAL ENCOUNTER (OUTPATIENT)
Facility: MEDICAL CENTER | Age: 30
End: 2025-05-14
Attending: FAMILY MEDICINE
Payer: OTHER GOVERNMENT

## 2025-05-14 ENCOUNTER — OFFICE VISIT (OUTPATIENT)
Dept: MEDICAL GROUP | Facility: MEDICAL CENTER | Age: 30
End: 2025-05-14
Payer: OTHER GOVERNMENT

## 2025-05-14 VITALS
OXYGEN SATURATION: 100 % | TEMPERATURE: 98.8 F | DIASTOLIC BLOOD PRESSURE: 78 MMHG | WEIGHT: 193.12 LBS | HEIGHT: 69 IN | BODY MASS INDEX: 28.6 KG/M2 | SYSTOLIC BLOOD PRESSURE: 126 MMHG | HEART RATE: 86 BPM

## 2025-05-14 DIAGNOSIS — Z11.3 ROUTINE SCREENING FOR STI (SEXUALLY TRANSMITTED INFECTION): ICD-10-CM

## 2025-05-14 DIAGNOSIS — R19.09 MASS OF INGUINAL REGION: ICD-10-CM

## 2025-05-14 DIAGNOSIS — R10.2 PELVIC PAIN: ICD-10-CM

## 2025-05-14 DIAGNOSIS — R10.2 PELVIC PAIN: Primary | ICD-10-CM

## 2025-05-14 DIAGNOSIS — R59.0 LYMPHADENOPATHY, INGUINAL: Primary | ICD-10-CM

## 2025-05-14 LAB
APPEARANCE UR: CLEAR
BILIRUB UR STRIP-MCNC: ABNORMAL MG/DL
COLOR UR AUTO: ABNORMAL
GLUCOSE UR STRIP.AUTO-MCNC: NEGATIVE MG/DL
KETONES UR STRIP.AUTO-MCNC: ABNORMAL MG/DL
LEUKOCYTE ESTERASE UR QL STRIP.AUTO: ABNORMAL
NITRITE UR QL STRIP.AUTO: NEGATIVE
PH UR STRIP.AUTO: 6 [PH] (ref 5–8)
PROT UR QL STRIP: 30 MG/DL
RBC UR QL AUTO: NEGATIVE
SP GR UR STRIP.AUTO: >=1.03
UROBILINOGEN UR STRIP-MCNC: 0.2 MG/DL

## 2025-05-14 PROCEDURE — 99214 OFFICE O/P EST MOD 30 MIN: CPT | Performed by: FAMILY MEDICINE

## 2025-05-14 PROCEDURE — 81002 URINALYSIS NONAUTO W/O SCOPE: CPT | Performed by: FAMILY MEDICINE

## 2025-05-14 PROCEDURE — 3074F SYST BP LT 130 MM HG: CPT | Performed by: FAMILY MEDICINE

## 2025-05-14 PROCEDURE — 3078F DIAST BP <80 MM HG: CPT | Performed by: FAMILY MEDICINE

## 2025-05-14 PROCEDURE — 87077 CULTURE AEROBIC IDENTIFY: CPT

## 2025-05-14 PROCEDURE — 87086 URINE CULTURE/COLONY COUNT: CPT

## 2025-05-14 PROCEDURE — 87186 SC STD MICRODIL/AGAR DIL: CPT

## 2025-05-14 RX ORDER — SULFAMETHOXAZOLE AND TRIMETHOPRIM 800; 160 MG/1; MG/1
1 TABLET ORAL 2 TIMES DAILY
Qty: 10 TABLET | Refills: 0 | Status: SHIPPED | OUTPATIENT
Start: 2025-05-14 | End: 2025-05-19

## 2025-05-14 ASSESSMENT — ENCOUNTER SYMPTOMS
CHILLS: 0
FEVER: 0

## 2025-05-14 NOTE — PROGRESS NOTES
Verbal consent was acquired by the patient to use [employer] ambient listening note generation during this visit.      Edward was seen today for follow-up.    Diagnoses and all orders for this visit:    Pelvic pain  -     POCT Urinalysis  -     URINE CULTURE(NEW); Future  -     sulfamethoxazole-trimethoprim (BACTRIM DS) 800-160 MG tablet; Take 1 Tablet by mouth 2 times a day for 5 days.    Mass of inguinal region  -     US-INGUINAL HERNIA; Future    Routine screening for STI (sexually transmitted infection)  -     ANTIBODY,TREPONEMA PALLIDUM; Future  -     HIV AG/AB COMBO ASSAY SCREENING; Future  -     HEP C VIRUS ANTIBODY; Future  -     Chlamydia/GC, PCR (Urine); Future  -     HSV I/II IGG & IGM SERUM; Future  -     HEP B SURFACE ANTIGEN; Future                  Assessment & Plan  1. Urinary urgency, pelvic pain  New condition, unstable, urinalysis showed signs of infection.  Start Bactrim.  Send this for urine culture.    2.  Right inguinal mass  New condition, unstable, appears lymph node enlargement, will check ultrasound for further evaluation.    3.  STD test  Patient requested STD test which I ordered today.    Can follow-up as needed          Chief complaint::The primary encounter diagnosis was Pelvic pain. Diagnoses of Mass of inguinal region and Routine screening for STI (sexually transmitted infection) were also pertinent to this visit.      History of Present Illness  The patient is a 29-year-old female who presents for evaluation of urinary urgency and a painful bump on the vulva.    Urinary urgency  - She reports experiencing urinary urgency, accompanied by a reduced volume of urine production.  Pelvic pain.    Painful bump at right inguinal area  - She has noticed a painful bump at right inguinal area which has been present for approximately 1 week.  - Initially, the area was puffy and sensitive to touch, but it has since evolved into a palpable bump.  - The size of the bump peaked 3 days ago and has  "since decreased.  - She does not perceive the bump as being deeply embedded.  - She recalls a similar occurrence when she was 20 years old.  - She admits to shaving the area.      Review of Systems   Constitutional:  Negative for chills and fever.   Genitourinary:  Positive for urgency.        Pelvic pain          Medications and Allergies:     Current Medications[1]    /78 (BP Location: Left arm, Patient Position: Sitting, BP Cuff Size: Adult)   Pulse 86   Temp 37.1 °C (98.8 °F) (Temporal)   Ht 1.74 m (5' 8.5\")   Wt 87.6 kg (193 lb 2 oz)   SpO2 100% , Body mass index is 28.94 kg/m².      Physical Exam  Constitutional:       Appearance: Normal appearance. She is well-developed and well-groomed.   HENT:      Head: Normocephalic and atraumatic.      Right Ear: External ear normal.      Left Ear: External ear normal.   Eyes:      General:         Right eye: No discharge.         Left eye: No discharge.      Conjunctiva/sclera: Conjunctivae normal.   Cardiovascular:      Rate and Rhythm: Normal rate.   Pulmonary:      Effort: Pulmonary effort is normal. No respiratory distress.   Genitourinary:     Comments: Medical assistant Chikis present in room during pelvic exam     Nontender mass palpated at right inguinal area.  Musculoskeletal:      Cervical back: Neck supple.   Skin:     Findings: No rash.   Neurological:      Mental Status: She is alert.   Psychiatric:         Mood and Affect: Mood and affect normal.         Behavior: Behavior normal.                  Please note that this dictation was created using voice recognition software. I have made every reasonable attempt to correct obvious errors, but I expect that there are errors of grammar and possibly content that I did not discover before finalizing the note.           [1]   Current Outpatient Medications   Medication Sig Dispense Refill    sulfamethoxazole-trimethoprim (BACTRIM DS) 800-160 MG tablet Take 1 Tablet by mouth 2 times a day for 5 days. " 10 Tablet 0    amphetamine-dextroamphetamine ER (ADDERALL XR) 30 MG XR capsule Take 1 Capsule by mouth every morning for 30 days. 30 Capsule 0    [START ON 5/23/2025] amphetamine-dextroamphetamine ER (ADDERALL XR) 30 MG XR capsule Take 1 Capsule by mouth every morning for 30 days. 30 Capsule 0    [START ON 6/21/2025] amphetamine-dextroamphetamine ER (ADDERALL XR) 30 MG XR capsule Take 1 Capsule by mouth every morning for 30 days. 30 Capsule 0    SEMAGLUTIDE-WEIGHT MANAGEMENT SC Inject  under the skin.      ARIPiprazole (ABILIFY) 10 MG Tab Take 1 Tablet by mouth at bedtime. 90 Tablet 0    LORYNA 3-0.02 MG per tablet Take 1 Tablet by mouth every day.       No current facility-administered medications for this visit.

## 2025-05-16 ENCOUNTER — HOSPITAL ENCOUNTER (OUTPATIENT)
Dept: RADIOLOGY | Facility: MEDICAL CENTER | Age: 30
End: 2025-05-16
Attending: FAMILY MEDICINE
Payer: OTHER GOVERNMENT

## 2025-05-16 DIAGNOSIS — R19.09 MASS OF INGUINAL REGION: ICD-10-CM

## 2025-05-16 LAB
BACTERIA UR CULT: ABNORMAL
BACTERIA UR CULT: ABNORMAL
SIGNIFICANT IND 70042: ABNORMAL
SITE SITE: ABNORMAL
SOURCE SOURCE: ABNORMAL

## 2025-05-16 PROCEDURE — 76857 US EXAM PELVIC LIMITED: CPT

## 2025-05-20 ENCOUNTER — HOSPITAL ENCOUNTER (OUTPATIENT)
Dept: LAB | Facility: MEDICAL CENTER | Age: 30
End: 2025-05-20
Attending: FAMILY MEDICINE
Payer: OTHER GOVERNMENT

## 2025-05-20 DIAGNOSIS — R59.0 LYMPHADENOPATHY, INGUINAL: ICD-10-CM

## 2025-05-20 DIAGNOSIS — Z11.3 ROUTINE SCREENING FOR STI (SEXUALLY TRANSMITTED INFECTION): ICD-10-CM

## 2025-05-20 LAB
ANION GAP SERPL CALC-SCNC: 15 MMOL/L (ref 7–16)
BUN SERPL-MCNC: 7 MG/DL (ref 8–22)
C TRACH DNA SPEC QL NAA+PROBE: NEGATIVE
CALCIUM SERPL-MCNC: 10.1 MG/DL (ref 8.5–10.5)
CHLORIDE SERPL-SCNC: 101 MMOL/L (ref 96–112)
CO2 SERPL-SCNC: 21 MMOL/L (ref 20–33)
CREAT SERPL-MCNC: 1.08 MG/DL (ref 0.5–1.4)
GFR SERPLBLD CREATININE-BSD FMLA CKD-EPI: 72 ML/MIN/1.73 M 2
GLUCOSE SERPL-MCNC: 94 MG/DL (ref 65–99)
HBV SURFACE AG SER QL: NORMAL
HCV AB SER QL: NORMAL
HIV 1+2 AB+HIV1 P24 AG SERPL QL IA: NORMAL
N GONORRHOEA DNA SPEC QL NAA+PROBE: NEGATIVE
POTASSIUM SERPL-SCNC: 4.1 MMOL/L (ref 3.6–5.5)
SODIUM SERPL-SCNC: 137 MMOL/L (ref 135–145)
SPECIMEN SOURCE: NORMAL

## 2025-05-20 PROCEDURE — 82784 ASSAY IGA/IGD/IGG/IGM EACH: CPT

## 2025-05-20 PROCEDURE — 87491 CHLMYD TRACH DNA AMP PROBE: CPT

## 2025-05-20 PROCEDURE — 86780 TREPONEMA PALLIDUM: CPT

## 2025-05-20 PROCEDURE — 36415 COLL VENOUS BLD VENIPUNCTURE: CPT

## 2025-05-20 PROCEDURE — 80048 BASIC METABOLIC PNL TOTAL CA: CPT

## 2025-05-20 PROCEDURE — 86696 HERPES SIMPLEX TYPE 2 TEST: CPT

## 2025-05-20 PROCEDURE — 87389 HIV-1 AG W/HIV-1&-2 AB AG IA: CPT

## 2025-05-20 PROCEDURE — 87591 N.GONORRHOEAE DNA AMP PROB: CPT

## 2025-05-20 PROCEDURE — 86695 HERPES SIMPLEX TYPE 1 TEST: CPT

## 2025-05-20 PROCEDURE — 87340 HEPATITIS B SURFACE AG IA: CPT

## 2025-05-20 PROCEDURE — 86803 HEPATITIS C AB TEST: CPT

## 2025-05-20 PROCEDURE — 86694 HERPES SIMPLEX NES ANTBDY: CPT

## 2025-05-22 LAB
HSV1+2 IGG SER IA-ACNC: >22.4 IV
IGM SERPL-MCNC: 360 MG/DL (ref 35–263)

## 2025-05-23 LAB
HSV1 GG IGG SER-ACNC: 45.9 IV
HSV2 GG IGG SER-ACNC: 1.64 IV
T PALLIDUM AB SER QL AGGL: NON REACTIVE

## 2025-05-27 ENCOUNTER — HOSPITAL ENCOUNTER (OUTPATIENT)
Dept: RADIOLOGY | Facility: MEDICAL CENTER | Age: 30
End: 2025-05-27
Attending: FAMILY MEDICINE
Payer: OTHER GOVERNMENT

## 2025-05-27 ENCOUNTER — OFFICE VISIT (OUTPATIENT)
Dept: BEHAVIORAL HEALTH | Facility: CLINIC | Age: 30
End: 2025-05-27
Payer: OTHER GOVERNMENT

## 2025-05-27 DIAGNOSIS — F31.81 BIPOLAR 2 DISORDER (HCC): Primary | ICD-10-CM

## 2025-05-27 DIAGNOSIS — R59.0 LYMPHADENOPATHY, INGUINAL: ICD-10-CM

## 2025-05-27 PROCEDURE — 74177 CT ABD & PELVIS W/CONTRAST: CPT

## 2025-05-27 PROCEDURE — 90834 PSYTX W PT 45 MINUTES: CPT | Performed by: COUNSELOR

## 2025-05-27 PROCEDURE — 700117 HCHG RX CONTRAST REV CODE 255: Performed by: FAMILY MEDICINE

## 2025-05-27 RX ADMIN — IOHEXOL 100 ML: 350 INJECTION, SOLUTION INTRAVENOUS at 13:53

## 2025-05-27 NOTE — PROGRESS NOTES
"[facility]   Therapy Progress Note        Name: Edward Gan  MRN: [id number]  : 1995  Age: 29 y.o.  Date of assessment: 2025  PCP: Quintin Reagan M.D.  Persons in attendance: Patient and Siblings  Total session time: 50 minutes      Topics addressed in psychotherapy include: Due to patient's sister in session today, therapist informed the patient their medical records are totally confidential except for the use by other providers involved in their care, or if the patient signs a release, or to report instances of child or elder abuse, or if it is determined they are an immediate risk to harm themselves or others. Therapist discussed boundaries with patient and sister as well.   Behavior: Patient is accompanied by her sister today to add context/perspective on patient's family dynamics as patient has plans to see her mom this weekend for the first time in several months and is feeling anxious on how to navigate that meeting. Patient's sister shared her experience growing up with their mom, and how patient and sister had \"2 different moms,\" and both sisters shared resentment they have towards their mom about how their mom treated each of them. Patient expresses a desire to set and enforce healthy boundaries with her mom this week, while also wanting to hold her mom accountable for her upbringing.   Interventions: Therapist actively listened and engaged patient in a conversation regarding her goals for the meeting with her mom. Therapist reflected back family dynamics, themes present, and vicarious trauma that patient and her sister have gone through. Therapist supported patient in identifying the boundaries that she would like to establish with her mom and how to do so in a productive way. Therapist reflected back strengths in patient and her sister's relationship.   Response: Patient was receptive to above interventions, and shares goals of wanting to have a healthy relationship " with her mom and also protect herself from unhealthy patterns. Patient acknowledges the themes and dynamics present and expanded on her relationship with her sister and how helpful it has been. Patient was able to identify healthy boundaries she would like to enforce during this meeting with her mom, acknowledging that she can cancel at any time. Patient and sister expressed gratitude for one another.   Plan: Patient returns for next therapy session on 6/25/2025.    Objective Observations:   Participation:Active verbal participation, Attentive, Engaged, and Open to feedback   Grooming:Good, Casual, and Neat   Cognition:Alert and Fully Oriented   Eye Contact:Good   Mood:Euthymic, Anxious, and Happy   Affect:Congruent with content, Sad, Tearful, Bright, and Happy   Thought Process:Goal-directed and Circumstantial   Speech:Rate within normal limits and Volume within normal limits    Current Risk:   Suicide: low   Homicide: low   Self-Harm: low   Relapse: low   Safety Plan Reviewed: not applicable    Care Plan Updated: No    Does patient express agreement with the above plan? Yes     Diagnosis:  1. Bipolar 2 disorder (HCC)        Referral appointment(s) scheduled? No       ALMA Chaudhry

## 2025-06-03 ENCOUNTER — APPOINTMENT (OUTPATIENT)
Dept: MEDICAL GROUP | Facility: MEDICAL CENTER | Age: 30
End: 2025-06-03
Payer: OTHER GOVERNMENT

## 2025-06-03 VITALS
HEIGHT: 68 IN | DIASTOLIC BLOOD PRESSURE: 78 MMHG | WEIGHT: 143.3 LBS | TEMPERATURE: 98.6 F | BODY MASS INDEX: 21.72 KG/M2 | SYSTOLIC BLOOD PRESSURE: 122 MMHG

## 2025-06-03 DIAGNOSIS — R93.2 ABNORMAL CT SCAN, GALLBLADDER: ICD-10-CM

## 2025-06-03 DIAGNOSIS — B00.9 HERPES: ICD-10-CM

## 2025-06-03 DIAGNOSIS — R59.0 INGUINAL LYMPHADENOPATHY: Primary | ICD-10-CM

## 2025-06-03 PROCEDURE — 3078F DIAST BP <80 MM HG: CPT | Performed by: FAMILY MEDICINE

## 2025-06-03 PROCEDURE — 99214 OFFICE O/P EST MOD 30 MIN: CPT | Performed by: FAMILY MEDICINE

## 2025-06-03 PROCEDURE — 3074F SYST BP LT 130 MM HG: CPT | Performed by: FAMILY MEDICINE

## 2025-06-03 RX ORDER — VALACYCLOVIR HYDROCHLORIDE 1 G/1
1000 TABLET, FILM COATED ORAL 2 TIMES DAILY
Qty: 20 TABLET | Refills: 0 | Status: SHIPPED | OUTPATIENT
Start: 2025-06-03

## 2025-06-03 ASSESSMENT — ENCOUNTER SYMPTOMS
CHILLS: 0
FEVER: 0
PALPITATIONS: 0

## 2025-06-03 NOTE — PROGRESS NOTES
Verbal consent was acquired by the patient to use [employer] ambient listening note generation during this visit.      Diagnoses and all orders for this visit:    Inguinal lymphadenopathy  -     Referral to General Surgery    Abnormal CT scan, gallbladder  -     Referral to General Surgery    Herpes  -     valacyclovir (VALTREX) 1 GM Tab; Take 1 Tablet by mouth 2 times a day.                  Assessment & Plan  1.  Inguinal lymphadenopathy  New condition, unstable  - CT scan results indicate multiple enlarged lymph nodes on the right side. Low right external iliac and right inguinal adenopathy. Considerations include malignancy such as lymphoma and abnormalities of the right leg including inflammatory and neoplastic which would drain into these nodes.   - Spleen, bowels, and kidneys are normal  - Referral to a general surgeon for further evaluation for these lymphadenopathy    2. Gallbladder edema  New condition, unstable  - CT scan shows gallbladder edema, likely due to inflammation  - Advised to monitor for any upper abdominal pain  - If upper abdominal pain develops, consultation with the surgeon is recommended    3.  Herpes  New condition, she was found to have positive test, treated with Valtrex 1 g 2 times daily for 7 to 10 days    Follow-up as needed.      Chief complaint::The primary encounter diagnosis was Inguinal lymphadenopathy. Diagnoses of Abnormal CT scan, gallbladder and Herpes were also pertinent to this visit.      History of Present Illness  The patient is a 29-year-old female who presents for evaluation of CT results.    Lymph Node Enlargement  - Experiencing lymph node enlargement, previously identified on an ultrasound as multiple enlarged lymph nodes on the right side  - No reported family history of lymphoma, colon cancer, or inguinal region cancers  - Paternal grandmother had breast cancer  - Maternal grandmother had stomach cancer  - No symptoms suggestive of colon issues such as diarrhea,  "nausea, vomiting, or pelvic pain    Gallbladder Edema  - Some gallbladder edema, likely indeterminate or reactive due to inflammation  - No current upper abdominal pain reported  - Mother had her gallbladder removed due to stones    Noncancerous Breast Lump  - History of a noncancerous breast lump, which was biopsied and subsequently removed    Supplemental information: PAST SURGICAL HISTORY:  - Noncancerous breast lump removal    FAMILY HISTORY  Her paternal grandmother had breast cancer.  Her maternal grandmother had stomach cancer.  Her mother had her gallbladder removed due to stones.      Review of Systems   Constitutional:  Negative for chills and fever.   Cardiovascular:  Negative for chest pain, palpitations and leg swelling.          Medications and Allergies:       Current Outpatient Medications:     valacyclovir, 1,000 mg, Oral, BID, Taking    amphetamine-dextroamphetamine ER, 30 mg, Oral, QAM    [START ON 6/21/2025] amphetamine-dextroamphetamine ER, 30 mg, Oral, QAM    SEMAGLUTIDE-WEIGHT MANAGEMENT SC, Inject  under the skin.    ARIPiprazole, 10 mg, Oral, QHS    Loryna, 1 Tablet, Oral, DAILY     /78 (BP Location: Left arm, Patient Position: Sitting, BP Cuff Size: Adult)   Temp 37 °C (98.6 °F) (Temporal)   Ht 1.727 m (5' 8\")   Wt 65 kg (143 lb 4.8 oz) , Body mass index is 21.79 kg/m².      Physical Exam  Constitutional:       Appearance: Normal appearance. She is well-developed and well-groomed.   HENT:      Head: Normocephalic and atraumatic.      Right Ear: External ear normal.      Left Ear: External ear normal.   Eyes:      General:         Right eye: No discharge.         Left eye: No discharge.      Conjunctiva/sclera: Conjunctivae normal.   Cardiovascular:      Rate and Rhythm: Normal rate.   Pulmonary:      Effort: Pulmonary effort is normal. No respiratory distress.   Musculoskeletal:      Cervical back: Neck supple.   Skin:     Findings: No rash.   Neurological:      Mental Status: She " is alert.   Psychiatric:         Mood and Affect: Mood and affect normal.         Behavior: Behavior normal.            CT-ABDOMEN-PELVIS WITH  Narrative: 5/27/2025 1:48 PM    HISTORY/REASON FOR EXAM:  Right groin lymphadenopathy    COMPARISON: 5/16/2025 ultrasound showed right inguinal adenopathy..    TECHNIQUE/EXAM DESCRIPTION:  CT scan of the abdomen and pelvis with contrast.    Contrast-enhanced helical scanning was obtained from the diaphragmatic domes through the pubic symphysis following the bolus administration of nonionic contrast without complication.    100 mL of Omnipaque 350 nonionic contrast was administered without complication.    Low dose optimization technique was utilized for this CT exam including automated exposure control and adjustment of the mA and/or kV according to patient size.    FINDINGS: The visible lung bases do not show any suspicious abnormality. No peritoneal air. No umbilical hernia.    Liver: No suspicious liver lesion evident. Mild fatty infiltration of the falciform ligament. Normal portal vein.    Biliary: Gallbladder wall thickening is noted which is nonspecific. The gallbladder is nondistended and this is probably reactive. No bile duct dilatation.    Pancreas: No mass or inflammation evident.    Spleen: Normal size. The spleen measures 10 x 10.5 x 5.6 cm.    Adrenals: No mass.    Aorta: No aortic aneurysm.    Lymph nodes: No aortic or mesenteric adenopathy. The right inguinal region shows mildly enlarged nodes including a 3.1 x 1.5 cm node (axial image 69). The left inguinal lymph nodes are small and normal in size. Prominent low right external iliac node   measures 3.2 x 1.3 cm (coronal image 19). No upper pelvic adenopathy.    Kidneys: No hydronephrosis. No suspicious renal mass. No obstructing stone. Bladder partially full.    Bowel: No bowel distention or inflammation. The appendix is not visible.    Pelvis: Uterus and ovaries grossly normal ICD. Vaginal tampon. No  pathologic free pelvic fluid. No iliac vein clot suggested.    Bones: No acute bony process evident. The hips and SI joints appear normal.  Impression: 1. Low right external iliac and right inguinal adenopathy. Considerations include malignancy such as lymphoma and abnormalities of the right leg including inflammatory and neoplastic which would drain into these nodes.    2. No left inguinal adenopathy. No upper pelvic or retroperitoneal adenopathy. Spleen within normal limits for size.    3. Nonspecific gallbladder wall edema. The gallbladder is not distended. This is indeterminate but probably is reactive and incidental.           Please note that this dictation was created using voice recognition software. I have made every reasonable attempt to correct obvious errors, but I expect that there are errors of grammar and possibly content that I did not discover before finalizing the note.

## 2025-06-09 DIAGNOSIS — F90.2 ADHD (ATTENTION DEFICIT HYPERACTIVITY DISORDER), COMBINED TYPE: ICD-10-CM

## 2025-06-09 PROCEDURE — RXMED WILLOW AMBULATORY MEDICATION CHARGE: Performed by: PSYCHIATRY & NEUROLOGY

## 2025-06-09 RX ORDER — DEXTROAMPHETAMINE SACCHARATE, AMPHETAMINE ASPARTATE MONOHYDRATE, DEXTROAMPHETAMINE SULFATE AND AMPHETAMINE SULFATE 7.5; 7.5; 7.5; 7.5 MG/1; MG/1; MG/1; MG/1
30 CAPSULE, EXTENDED RELEASE ORAL EVERY MORNING
Qty: 30 CAPSULE | Refills: 0 | Status: SHIPPED | OUTPATIENT
Start: 2025-06-09 | End: 2025-07-10

## 2025-06-09 NOTE — TELEPHONE ENCOUNTER
[facility] OUT OF [facility] PLEASE SEND TO THAL[facility]OSA    Received request via: Patient    Was the patient seen in the last year in this department? Yes    Does the patient have an active prescription (recently filled or refills available) for medication(s) requested? No    Pharmacy Name: [facility]     Does the patient have FDC Plus and need 100-day supply? (This applies to ALL medications) Patient does not have SCP

## 2025-06-10 ENCOUNTER — PHARMACY VISIT (OUTPATIENT)
Dept: PHARMACY | Facility: MEDICAL CENTER | Age: 30
End: 2025-06-10
Payer: COMMERCIAL

## 2025-06-11 NOTE — Clinical Note
REFERRAL APPROVAL NOTICE         Sent on June 11, 2025                   Edward Dunn Malik  [address] [place] [address]                   Dear MsCinda Malik,    After a careful review of the medical information and benefit coverage, [facility] has processed your referral. See below for additional details.    If applicable, you must be actively enrolled with your insurance for coverage of the authorized service. If you have any questions regarding your coverage, please contact your insurance directly.    REFERRAL INFORMATION   Referral #:  [id number]  Referred-To Department    Referred-By Provider:  General Surgery    Quintin Reagan M.D.   Department Of Surgery      [address]  [address]  [place] [address]  [phone] [address], [address]  [place] [id number] [phone]    Referral Start Date:  06/03/2025  Referral End Date:   06/03/2026             SCHEDULING  If you do not already have an appointment, please call [phone] to make an appointment.     MORE INFORMATION  If you do not already have a [employer] account, sign up at: [email or web address]  You can access your medical information, make appointments, see lab results, billing information, and more.  If you have questions regarding this referral, please contact  the [facility] Referrals department at:             [phone]. Monday - Friday 8:00AM - 5:00PM.     Sincerely,    [facility]

## 2025-06-12 NOTE — PROGRESS NOTES
Subjective:      Primary care physician: Quintin Reagan M.D.  Referring Provider: Quintin Reagan M.D.     Chief Complaint: No chief complaint on file.    Diagnosis:   1. Inguinal lymphadenopathy        2. Abnormal CT scan, gallbladder            History of presenting illness:    Edward Gan is a pleasant 29 y.o. female who is referred by his PCP for evaluation of inguinal lymphadenopathy.    US INGUINAL HERNIA on 5/16/25 noted multiple enlarged lymph nodes within the right groin soft tissues including a 3.0 x 2.2 x 1.1 cm lymph node.    CT ABD/PELVIS on 5/27/25 noted low right external iliac and right inguinal adenopathy, no left inguinal adenopathy with no upper pelvic or retroperitoneal adenopathy, spleen within normal limits for size, and nonspecific gallbladder wall edema with the gallbladder not distended.    She is here today for initial evaluation.  She endorses a history above.  Enlarged lymph node on the left inguinal area has been enlarged for about 6 weeks.  This coincided with an active HSV infection which was treated with antivirals.  She also shaves regularly and experiences ingrown hairs.  She denies any systemic symptoms to include fever chills weight loss rigors or diaphoresis.    Past Medical History[1]  Past Surgical History[2]  Allergies[3]  Encounter Medications[4]  Social History     Socioeconomic History    Marital status:      Spouse name: Not on file    Number of children: Not on file    Years of education: Not on file    Highest education level: Bachelor's degree (e.g., BA, AB, BS)   Occupational History    Not on file   Tobacco Use    Smoking status: Never    Smokeless tobacco: Never   Vaping Use    Vaping status: Former    Substances: CBD   Substance and Sexual Activity    Alcohol use: Yes     Comment: once a month    Drug use: Not Currently     Types: Marijuana, Inhaled     Comment: once a week    Sexual activity: Yes      Partners: Male     Comment: , Not working, Doing Bachelor in history major   Other Topics Concern    Not on file   Social History Narrative    Not on file     Social Drivers of Health     Financial Resource Strain: Low Risk  (4/18/2023)    Overall Financial Resource Strain (CARDIA)     Difficulty of Paying Living Expenses: Not hard at all   Food Insecurity: No Food Insecurity (4/18/2023)    Hunger Vital Sign     Worried About Running Out of Food in the Last Year: Never true     Ran Out of Food in the Last Year: Never true   Transportation Needs: No Transportation Needs (4/18/2023)    PRAPARE - Transportation     Lack of Transportation (Medical): No     Lack of Transportation (Non-Medical): No   Physical Activity: Insufficiently Active (4/18/2023)    Exercise Vital Sign     Days of Exercise per Week: 2 days     Minutes of Exercise per Session: 30 min   Stress: Stress Concern Present (4/18/2023)    [employer] - Occupational Stress Questionnaire     Feeling of Stress : To some extent   Social Connections: Socially Integrated (4/18/2023)    Social Connection and Isolation Panel [NHANES]     Frequency of Communication with Friends and Family: Twice a week     Frequency of Social Gatherings with Friends and Family: Once a week     Attends Jehovah's witness Services: More than 4 times per year     Active Member of Clubs or Organizations: Yes     Attends Club or Organization Meetings: More than 4 times per year     Marital Status:    Intimate Partner Violence: Not on file   Housing Stability: Low Risk  (4/18/2023)    Housing Stability Vital Sign     Unable to Pay for Housing in the Last Year: No     Number of Places Lived in the Last Year: 1     Unstable Housing in the Last Year: No      Tobacco Use History[5]  Social History     Substance and Sexual Activity   Alcohol Use Yes    Comment: once a month     Social History     Substance and Sexual Activity   Drug Use Not Currently     Types: Marijuana, Inhaled    Comment: once a week      Family History   Problem Relation Age of Onset    Psychiatric Illness Mother     Hypertension Mother     Psychiatric Illness Father     Other Brother         Spina Bifida    Cancer Paternal Grandmother         Breast cancer       Review of Systems   Respiratory: Negative.     Cardiovascular: Negative.    Neurological: Negative.    All other systems reviewed and are negative.       Objective:   There were no vitals taken for this visit.    Physical Exam  Vitals reviewed.   Constitutional:       Appearance: She is normal weight.   HENT:      Head: Normocephalic and atraumatic.      Mouth/Throat:      Mouth: Mucous membranes are moist.      Pharynx: Oropharynx is clear.   Eyes:      Conjunctiva/sclera: Conjunctivae normal.      Pupils: Pupils are equal, round, and reactive to light.   Cardiovascular:      Rate and Rhythm: Normal rate and regular rhythm.   Pulmonary:      Effort: Pulmonary effort is normal.   Abdominal:      General: Abdomen is flat.      Palpations: Abdomen is soft.   Lymphadenopathy:      Lower Body: Left inguinal adenopathy present.   Skin:     General: Skin is warm and dry.      Capillary Refill: Capillary refill takes less than 2 seconds.   Neurological:      General: No focal deficit present.      Mental Status: She is alert and oriented to person, place, and time.   Psychiatric:         Mood and Affect: Mood normal.         Labs   Latest Reference Range & Units 05/10/22 09:27   WBC 4.8 - 10.8 K/uL 5.8   RBC 4.20 - 5.40 M/uL 4.56   Hemoglobin 12.0 - 16.0 g/dL 13.1   Hematocrit 37.0 - 47.0 % 39.8   MCV 81.4 - 97.8 fL 87.3   MCH 27.0 - 33.0 pg 28.7   MCHC 33.6 - 35.0 g/dL 32.9 (L)   RDW 35.9 - 50.0 fL 40.3   Platelet Count 164 - 446 K/uL 310   MPV 9.0 - 12.9 fL 10.1   (L): Data is abnormally low     Latest Reference Range & Units 09/19/24 08:05 05/20/25 08:23   Sodium 135 - 145 mmol/L 138 137   Potassium 3.6 - 5.5 mmol/L 4.4 4.1   Chloride 96 -  112 mmol/L 99 101   Co2 20 - 33 mmol/L 24 21   Anion Gap 7.0 - 16.0  15.0 15.0   Glucose 65 - 99 mg/dL 79 94   Bun 8 - 22 mg/dL 10 7 (L)   Creatinine 0.50 - 1.40 mg/dL 0.77 1.08   GFR (CKD-EPI) >60 mL/min/1.73 m 2 108 72   Calcium 8.5 - 10.5 mg/dL 9.9 10.1   Correct Calcium 8.5 - 10.5 mg/dL 9.3    AST(SGOT) 12 - 45 U/L 27    ALT(SGPT) 2 - 50 U/L 27    Alkaline Phosphatase 30 - 99 U/L 54    Total Bilirubin 0.1 - 1.5 mg/dL 0.6    Albumin 3.2 - 4.9 g/dL 4.8    Total Protein 6.0 - 8.2 g/dL 7.6    Globulin 1.9 - 3.5 g/dL 2.8    A-G Ratio g/dL 1.7    (L): Data is abnormally low     Imaging  CT ABD/PELVIS 5/27/25  IMPRESSION:  1. Low right external iliac and right inguinal adenopathy. Considerations include malignancy such as lymphoma and abnormalities of the right leg including inflammatory and neoplastic which would drain into these nodes.  2. No left inguinal adenopathy. No upper pelvic or retroperitoneal adenopathy. Spleen within normal limits for size.  3. Nonspecific gallbladder wall edema. The gallbladder is not distended. This is indeterminate but probably is reactive and incidental.    US INGUINAL HERNIA 5/16/25  IMPRESSION:  Multiple enlarged lymph nodes within the right groin soft tissues including a 3.0 x 2.2 x 1.1 cm lymph node.    Pathology  N/A    Procedures  N/A    Diagnosis:     1. Inguinal lymphadenopathy        2. Abnormal CT scan, gallbladder            Medical Decision Making:  Today's Assessment / Status / Plan:     29-year-old woman presenting with isolated left inguinal adenopathy.  Her lymph node, while enlarged, feels well-demarcated and very mobile on exam.  She has some possible infectious reasons to have inguinal lymphadenopathy to include HSV infection and folliculitis.  HSV has since been treated.  Will plan to perform surveillance of this lymph node with an ultrasound in 3 months.    Patient in agreement with plan.  Return to clinic in 3 months.    Mackenzie Caruso MD, MS  Surgical  Oncology  [facility], [facility]      I, Dr. Caruso, have entered, reviewed and confirmed the above diagnoses related to this patient on this date of service, as per the time and date noted at top of this note.          [1]   Past Medical History:  Diagnosis Date    Anxiety     Depression 05/10/2022    ADHD/bi-polar   [2]   Past Surgical History:  Procedure Laterality Date    BREAST BIOPSY Right 5/24/2022    Procedure: EXCISIONAL BIOPSY, BREAST;  Surgeon: Carson Tineo M.D.;  Location: SURGERY SAME DAY [facility];  Service: General    OTHER  2021    ablation - neck   [3] No Known Allergies  [4]   Outpatient Encounter Medications as of 6/24/2025   Medication Sig Dispense Refill    amphetamine-dextroamphetamine ER (ADDERALL XR) 30 MG XR capsule Take 1 Capsule by mouth every morning for 30 days. 30 Capsule 0    valacyclovir (VALTREX) 1 GM Tab Take 1 Tablet by mouth 2 times a day. 20 Tablet 0    [START ON 6/21/2025] amphetamine-dextroamphetamine ER (ADDERALL XR) 30 MG XR capsule Take 1 Capsule by mouth every morning for 30 days. 30 Capsule 0    SEMAGLUTIDE-WEIGHT MANAGEMENT SC Inject  under the skin.      ARIPiprazole (ABILIFY) 10 MG Tab Take 1 Tablet by mouth at bedtime. 90 Tablet 0    LORYNA 3-0.02 MG per tablet Take 1 Tablet by mouth every day.       No facility-administered encounter medications on file as of 6/24/2025.   [5]   Social History  Tobacco Use   Smoking Status Never   Smokeless Tobacco Never

## 2025-06-24 ENCOUNTER — OFFICE VISIT (OUTPATIENT)
Facility: MEDICAL CENTER | Age: 30
End: 2025-06-24
Payer: OTHER GOVERNMENT

## 2025-06-24 VITALS
WEIGHT: 147.71 LBS | TEMPERATURE: 98.1 F | DIASTOLIC BLOOD PRESSURE: 62 MMHG | HEIGHT: 68 IN | BODY MASS INDEX: 22.39 KG/M2 | SYSTOLIC BLOOD PRESSURE: 114 MMHG

## 2025-06-24 DIAGNOSIS — R93.2 ABNORMAL CT SCAN, GALLBLADDER: ICD-10-CM

## 2025-06-24 DIAGNOSIS — R59.0 INGUINAL LYMPHADENOPATHY: Primary | ICD-10-CM

## 2025-06-24 PROCEDURE — 99204 OFFICE O/P NEW MOD 45 MIN: CPT | Performed by: SURGERY

## 2025-06-24 ASSESSMENT — ENCOUNTER SYMPTOMS
NEUROLOGICAL NEGATIVE: 1
RESPIRATORY NEGATIVE: 1
CARDIOVASCULAR NEGATIVE: 1

## 2025-06-25 ENCOUNTER — OFFICE VISIT (OUTPATIENT)
Dept: BEHAVIORAL HEALTH | Facility: CLINIC | Age: 30
End: 2025-06-25
Payer: OTHER GOVERNMENT

## 2025-06-25 DIAGNOSIS — F31.81 BIPOLAR 2 DISORDER (HCC): Primary | ICD-10-CM

## 2025-06-25 NOTE — PROGRESS NOTES
[facility]   Therapy Progress Note        Name: Edward Gan  MRN: [id number]  : 1995  Age: 29 y.o.  Date of assessment: 2025  PCP: Quintin Reagan M.D.  Persons in attendance: Patient  Total session time: 50 minutes      Topics addressed in psychotherapy include: Behavior: Patient shares that she ended up going to lunch with her mom and sister and reports that all went well and shortly thereafter patient's mom kicked her sister out and patient and her  are considering having patient's sister move in. Patient shares that she and her  have been doing well, while acknowledging that she continues to feel unsatisfied in her marriage and recognizes the presence of her resentment. Patient shares that she has been experiencing some somatic symptoms that her doctor does not have answers for and they are just keeping an eye on things for now; patient denies any current anxiety associated with her physical symptoms, and does express curiosity into what is happening.   Interventions: Therapist actively listened and reflected back emotions related to patient witnessing her sister get kicked out by their mom. Therapist provided psycho-education on resentment in a marriage and supported patient in identifying ways to overcome resentment via therapy and communication to increase hopefulness, and reflected back themes present and reminded patient she is deserving of happiness. Therapist provided psycho-education on the body/stress/anniversaries and explored patient's history this time of the year; therapist encouraged patient to listen to her body's needs and take space and increase self-care.   Response: Patient was receptive to above interventions, and expanded on the events that transpired before and after patient's sister getting kicked out and expresses sadness fo her younger sister who she is hoping to be able to help in the near future. Patient expressed understanding of  psycho-education and expressed some hopefulness in being able to overcome those challenges, while acknowledging the presence of her feelings about her manic episode and how her  holds her accountable for her actions during her manic episodes; patient expresses a desire to be in a happy/healthy/supportive relationship. Patient denies this time of the year being an anniversary for patient and expands on her thoughts of what her body is communicating and expresses motivation in setting and enforcing healthy boundaries and increasing self-care.   Plan: Patient returns for next therapy session on 7/16/2025.     Objective Observations:   Participation:Active verbal participation, Attentive, Engaged, and Open to feedback   Grooming:Good, Casual, and Neat   Cognition:Alert and Fully Oriented   Eye Contact:Good   Mood:Euthymic   Affect:Congruent with content and Tearful   Thought Process:Logical, Goal-directed, and Circumstantial   Speech:Rate within normal limits and Volume within normal limits    Current Risk:   Suicide: low   Homicide: low   Self-Harm: low   Relapse: low   Safety Plan Reviewed: not applicable    Care Plan Updated: No    Does patient express agreement with the above plan? Yes     Diagnosis:  1. Bipolar 2 disorder (HCC)        Referral appointment(s) scheduled? No       CHETNA Chaudhry.

## 2025-07-16 ENCOUNTER — OFFICE VISIT (OUTPATIENT)
Dept: BEHAVIORAL HEALTH | Facility: CLINIC | Age: 30
End: 2025-07-16
Payer: OTHER GOVERNMENT

## 2025-07-16 DIAGNOSIS — F31.81 BIPOLAR 2 DISORDER (HCC): Primary | ICD-10-CM

## 2025-07-16 PROCEDURE — 90834 PSYTX W PT 45 MINUTES: CPT | Performed by: COUNSELOR

## 2025-07-16 NOTE — PROGRESS NOTES
[facility]   Therapy Progress Note        Name: Edward Gan  MRN: [id number]  : 1995  Age: 29 y.o.  Date of assessment: 2025  PCP: Quintin Reagan M.D.  Persons in attendance: Patient  Total session time: 50 minutes      Topics addressed in psychotherapy include: Behavior: Patient reports that she and her  continue to struggle with boundaries; patient expands on her marital struggles while also acknowledging that despite the stress, she has been doing and feeling well. Patient expanded on the history of her relationship with her  and how they met; patient acknowledges that they moved very fast and patient was going through a difficult time in her life. Patient shares a behavior she has been engaging in, and reports wanting to stop the behavior due to feelings of guilt/shame.   Interventions: Therapist actively listened and validated patient's experience in her marriage. Therapist reflected back themes present in patient's marriage that also are reminiscent of some themes in her family of origin dynamics. Therapist explored patient's behavior and reflected back themes present to increase patient's insight and deconstruct her guilt/shame narrative around this behavior.   Response: Patient was receptive to above interventions, and expanded on some of her recent conversations with her . Patient acknowledges themes present and expanded on her parents' parenting of patient and her siblings. Patient acknowledged that she enjoys this behavior and there is no real risk involved and was able to deconstruct her guilt/shame narrative.   Plan: Patient returns for next therapy session on 2025.     Objective Observations:   Participation:Active verbal participation, Attentive, Engaged, and Open to feedback   Grooming:Good, Casual, and Neat   Cognition:Alert and Fully Oriented   Eye Contact:Good   Mood:Euthymic   Affect:Congruent with content   Thought  Process:Goal-directed and Circumstantial   Speech:Rate within normal limits and Volume within normal limits    Current Risk:   Suicide: low   Homicide: low   Self-Harm: low   Relapse: low   Safety Plan Reviewed: not applicable    Care Plan Updated: No    Does patient express agreement with the above plan? Yes     Diagnosis:  1. Bipolar 2 disorder (HCC)        Referral appointment(s) scheduled? No       CHETNA Chaudhry.

## 2025-07-18 ENCOUNTER — TELEMEDICINE (OUTPATIENT)
Dept: BEHAVIORAL HEALTH | Facility: CLINIC | Age: 30
End: 2025-07-18
Payer: OTHER GOVERNMENT

## 2025-07-18 DIAGNOSIS — F90.2 ADHD (ATTENTION DEFICIT HYPERACTIVITY DISORDER), COMBINED TYPE: ICD-10-CM

## 2025-07-18 DIAGNOSIS — F41.1 GENERALIZED ANXIETY DISORDER: ICD-10-CM

## 2025-07-18 DIAGNOSIS — F31.81 BIPOLAR 2 DISORDER (HCC): Primary | ICD-10-CM

## 2025-07-18 DIAGNOSIS — Z79.899 ENCOUNTER FOR LONG-TERM (CURRENT) USE OF MEDICATIONS: ICD-10-CM

## 2025-07-18 PROCEDURE — 99214 OFFICE O/P EST MOD 30 MIN: CPT | Mod: 95 | Performed by: PSYCHIATRY & NEUROLOGY

## 2025-07-18 RX ORDER — DEXTROAMPHETAMINE SACCHARATE, AMPHETAMINE ASPARTATE MONOHYDRATE, DEXTROAMPHETAMINE SULFATE AND AMPHETAMINE SULFATE 7.5; 7.5; 7.5; 7.5 MG/1; MG/1; MG/1; MG/1
30 CAPSULE, EXTENDED RELEASE ORAL EVERY MORNING
Qty: 30 CAPSULE | Refills: 0 | Status: SHIPPED | OUTPATIENT
Start: 2025-09-19 | End: 2025-10-19

## 2025-07-18 RX ORDER — DEXTROAMPHETAMINE SACCHARATE, AMPHETAMINE ASPARTATE MONOHYDRATE, DEXTROAMPHETAMINE SULFATE AND AMPHETAMINE SULFATE 7.5; 7.5; 7.5; 7.5 MG/1; MG/1; MG/1; MG/1
30 CAPSULE, EXTENDED RELEASE ORAL EVERY MORNING
Qty: 30 CAPSULE | Refills: 0 | Status: SHIPPED | OUTPATIENT
Start: 2025-08-21 | End: 2025-09-20

## 2025-07-18 RX ORDER — DEXTROAMPHETAMINE SACCHARATE, AMPHETAMINE ASPARTATE MONOHYDRATE, DEXTROAMPHETAMINE SULFATE AND AMPHETAMINE SULFATE 7.5; 7.5; 7.5; 7.5 MG/1; MG/1; MG/1; MG/1
30 CAPSULE, EXTENDED RELEASE ORAL EVERY MORNING
Qty: 30 CAPSULE | Refills: 0 | Status: SHIPPED | OUTPATIENT
Start: 2025-10-18 | End: 2025-11-17

## 2025-07-18 RX ORDER — ARIPIPRAZOLE 10 MG/1
10 TABLET ORAL
Qty: 90 TABLET | Refills: 0 | Status: SHIPPED | OUTPATIENT
Start: 2025-07-18

## 2025-07-18 NOTE — PROGRESS NOTES
PSYCHIATRY VIRTUAL VISIT FOLLOW-UP NOTE    Chief Complaint   Patient presents with    Follow-Up     Mood, anxiety, ADHD     This evaluation was conducted via Teams using secure and encrypted videoconferencing technology.   The patient was in a private location outside of their home in the [facility].    The patient's identity was confirmed and verbal consent was obtained for this virtual visit.     History Of Present Illness:  Edward Gan is a 29 y.o. female with bipolar 2 mood disorder, generalized anxiety disorder, PTSD, ADHD comes in today for follow up, was last seen 3 months ago.  She has been doing good in regards to her mental health.  She reports continued benefit from Adderall XR and Abilify.  She denies any overwhelming struggles with depression or anxiety or any recent reckless or impulsive behaviors.  She realization that her marriage might not work and down the line she would want to look at a separation or divorce.  She has recently reconnected with her sister and has been enjoying spending time with her.  She denies any other psychosocial stressors.  She denies having thoughts of wanting to hurt herself.    Social History:   She is , lives with  and adult step son (has intellectual disability) in [place], step daughter lives in [place], Bachelor's degree in History, mother and 2 younger siblings live in [place], father lives in [place], she and  own financial planning business.     Substance Use:  Alcohol - Infrequent use  Nicotine - Denies   Cannabis - Denies  Illicit drugs - Denies    Past Medication Trials:  Celexa (discontinued due to poor compliance), Zoloft, Risperdal IM, Ativan (effective)    Psychological testing with Dr. Emilia Park, Psy.D (11/3/2021): mild ADHD, combined type    Medications:  Current Outpatient Medications   Medication Sig Dispense Refill    [START ON 8/21/2025] amphetamine-dextroamphetamine ER (ADDERALL XR) 30 MG XR capsule Take 1  "Capsule by mouth every morning for 30 days. 30 Capsule 0    [START ON 2025] amphetamine-dextroamphetamine ER (ADDERALL XR) 30 MG XR capsule Take 1 Capsule by mouth every morning for 30 days. 30 Capsule 0    [START ON 10/18/2025] amphetamine-dextroamphetamine ER (ADDERALL XR) 30 MG XR capsule Take 1 Capsule by mouth every morning for 30 days. 30 Capsule 0    ARIPiprazole (ABILIFY) 10 MG Tab Take 1 Tablet by mouth at bedtime. 90 Tablet 0    amphetamine-dextroamphetamine ER (ADDERALL XR) 30 MG XR capsule Take 1 Capsule by mouth every morning for 30 days. 30 Capsule 0    SEMAGLUTIDE-WEIGHT MANAGEMENT SC Inject  under the skin.      LORYNA 3-0.02 MG per tablet Take 1 Tablet by mouth every day.       No current facility-administered medications for this visit.     Review Of Systems:    Psychiatric - Positive for infrequent anxiety, depression    Physical Examination:  Vital signs: There were no vitals taken for this visit.    Musculoskeletal: No abnormal movements.     Mental Status Evaluation:   General: Young female, dressed in casual attire, good grooming and hygiene, in no apparent distress, calm and cooperative, good eye contact, no psychomotor agitation or retardation  Orientation: Alert and oriented to person, place and time  Recent and remote memory: Grossly intact  Attention span and concentration: Grossly intact  Speech: Spontaneous, normal rate, rhythm and tone  Thought Process: Linear, logical and goal directed  Thought Content: Denies suicidal or homicidal ideations, intent or plan  Perception: No delusions noted  Associations: Intact  Language: Appropriate  Fund of knowledge and vocabulary: Grossly adequate  Mood: \"alright\"  Affect: Euthymic, mood congruent  Insight: Good  Judgment: Good    Depression screenin/25/2022    12:00 PM 2024     1:30 PM 2025     1:30 PM   Depression Screen (PHQ-2/PHQ-9)   PHQ-2 Total Score 0 0 0   PHQ-9 Total Score   1     Interpretation of PHQ-9 Total Score  "   Score Severity   1-4 No Depression   5-9 Mild Depression   10-14 Moderate Depression   15-19 Moderately Severe Depression   20-27 Severe Depression    Medical Records/Labs/Diagnostic Tests Reviewed:  [place] PDMP records - appropriate refills    Impression:  1.  Bipolar 2 mood disorder - stable   2.  Generalized anxiety disorder - stable    3.  ADHD, combined type - stable   4.  Long term use of medications - Abilify    Plan:  1.  Continue Abilify 10 mg at bedtime for mood stabilization  -Metabolic monitoring (7/2024): Lipid profile normal, A1c normal  -Yearly metabolic monitoring labs due in 7/2025, A1c and lipid profile ordered today  2.  Continue Adderall XR 30 mg in the morning for ADHD.  E-prescribed x 3 months.   3.  Continue Hydroxyzine 25 mg daily as needed for anxiety  4.  Continue individual psychotherapy with GIULIANA Chaudhry    Return to clinic in 3 months or sooner if symptoms worsen    The proposed treatment plan was discussed with the patient who was provided the opportunity to ask questions and make suggestions regarding alternative treatment. Patient verbalized understanding and expressed agreement with the plan.     Sumaya Wright M.D.  07/18/25    This note was created using voice recognition software (Dragon). The accuracy of the dictation is limited by the abilities of the software. I have reviewed the note prior to signing, however some errors in grammar and context are still possible. If you have any questions related to this note please do not hesitate to contact our office.

## 2025-08-05 ENCOUNTER — OFFICE VISIT (OUTPATIENT)
Dept: BEHAVIORAL HEALTH | Facility: CLINIC | Age: 30
End: 2025-08-05
Payer: OTHER GOVERNMENT

## 2025-08-05 DIAGNOSIS — F31.81 BIPOLAR 2 DISORDER (HCC): Primary | ICD-10-CM

## 2025-08-05 PROCEDURE — 90834 PSYTX W PT 45 MINUTES: CPT | Performed by: COUNSELOR

## (undated) DEVICE — MASK ANESTHESIA ADULT  - (100/CA)

## (undated) DEVICE — GLOVE BIOGEL INDICATOR SZ 7.5 SURGICAL PF LTX - (50PR/BX 4BX/CA)

## (undated) DEVICE — SLEEVE VASO CALF MED - (10PR/CA)

## (undated) DEVICE — GOWN WARMING STANDARD FLEX - (30/CA)

## (undated) DEVICE — ELECTRODE DUAL RETURN W/ CORD - (50/PK)

## (undated) DEVICE — TUBE CONNECTING SUCTION - CLEAR PLASTIC STERILE 72 IN (50EA/CA)

## (undated) DEVICE — BANDAGE ELASTIC STERILE MATRIX 6 X 10 (20EA/CA)

## (undated) DEVICE — CHLORAPREP 26 ML APPLICATOR - ORANGE TINT(25/CA)

## (undated) DEVICE — MANIFOLD NEPTUNE 1 PORT (20/PK)

## (undated) DEVICE — SPONGE GAUZESTER. 2X2 4-PL - (2/PK 50PK/BX 30BX/CS)

## (undated) DEVICE — HEAD HOLDER JUNIOR/ADULT

## (undated) DEVICE — CANISTER SUCTION RIGID RED 1500CC (40EA/CA)

## (undated) DEVICE — BOVIE BLADE COATED &INSULATED - 25/PK

## (undated) DEVICE — CATHETER IV 20 GA X 1-1/4 ---SURG.& SDS ONLY--- (50EA/BX)

## (undated) DEVICE — CLOSURE SKIN STRIP 1/2 X 4 IN - (STERI STRIP) (50/BX 4BX/CA)

## (undated) DEVICE — SET LEADWIRE 5 LEAD BEDSIDE DISPOSABLE ECG (1SET OF 5/EA)

## (undated) DEVICE — SUTURE 3-0 ETHILON FS-1 - (36/BX) 30 INCH

## (undated) DEVICE — PROTECTOR ULNA NERVE - (36PR/CA)

## (undated) DEVICE — LACTATED RINGERS INJ 1000 ML - (14EA/CA 60CA/PF)

## (undated) DEVICE — TOWEL STOP TIMEOUT SAFETY FLAG (40EA/CA)

## (undated) DEVICE — KIT  I.V. START (100EA/CA)

## (undated) DEVICE — TUBING CLEARLINK DUO-VENT - C-FLO (48EA/CA)

## (undated) DEVICE — MASK AIRWAY SIZE 3 UNIQUE SILICON (10/BX)

## (undated) DEVICE — DRAPE LAPAROTOMY T SHEET - (12EA/CA)

## (undated) DEVICE — DRAPE IOBAN II INCISE 23X17 - (10EA/BX 4BX/CA)

## (undated) DEVICE — PACK MINOR BASIN - (2EA/CA)

## (undated) DEVICE — SUTURE 3-0 VICRYL PLUS SH - 8X 18 INCH (12/BX)

## (undated) DEVICE — NEEDLE NON SAFETY 25 GA X 1 1/2 IN HYPO (100EA/BX)

## (undated) DEVICE — CANISTER SUCTION 3000ML MECHANICAL FILTER AUTO SHUTOFF MEDI-VAC NONSTERILE LF DISP  (40EA/CA)

## (undated) DEVICE — KIT ANESTHESIA W/CIRCUIT & 3/LT BAG W/FILTER (20EA/CA)

## (undated) DEVICE — SUTURE GENERAL

## (undated) DEVICE — SUTURE 4-0 MONOCRYL PLUS PS-2 - 27 INCH (36/BX)

## (undated) DEVICE — GLOVE BIOGEL SZ 7 SURGICAL PF LTX - (50PR/BX 4BX/CA)

## (undated) DEVICE — SUCTION INSTRUMENT YANKAUER BULBOUS TIP W/O VENT (50EA/CA)

## (undated) DEVICE — DRESSING TRANSPARENT FILM TEGADERM 2.375 X 2.75"  (100EA/BX)"

## (undated) DEVICE — ELECTRODE 850 FOAM ADHESIVE - HYDROGEL RADIOTRNSPRNT (50/PK)

## (undated) DEVICE — SODIUM CHL IRRIGATION 0.9% 1000ML (12EA/CA)

## (undated) DEVICE — DRAPE LARGE 3 QUARTER - (20/CA)